# Patient Record
Sex: MALE | Race: WHITE | Employment: OTHER | URBAN - METROPOLITAN AREA
[De-identification: names, ages, dates, MRNs, and addresses within clinical notes are randomized per-mention and may not be internally consistent; named-entity substitution may affect disease eponyms.]

---

## 2017-11-07 ENCOUNTER — TRANSFERRED RECORDS (OUTPATIENT)
Dept: HEALTH INFORMATION MANAGEMENT | Facility: CLINIC | Age: 77
End: 2017-11-07

## 2017-11-15 ENCOUNTER — TRANSFERRED RECORDS (OUTPATIENT)
Dept: HEALTH INFORMATION MANAGEMENT | Facility: CLINIC | Age: 77
End: 2017-11-15

## 2017-11-21 ENCOUNTER — TRANSFERRED RECORDS (OUTPATIENT)
Dept: HEALTH INFORMATION MANAGEMENT | Facility: CLINIC | Age: 77
End: 2017-11-21

## 2017-12-04 ENCOUNTER — PRE VISIT (OUTPATIENT)
Dept: UROLOGY | Facility: CLINIC | Age: 77
End: 2017-12-04

## 2017-12-04 NOTE — TELEPHONE ENCOUNTER
Patient with bladder cancer coming in for a second opinion. Chart reviewed and records have not been scanned yet. Message sent to oncology intake team.

## 2017-12-06 ENCOUNTER — TRANSFERRED RECORDS (OUTPATIENT)
Dept: HEALTH INFORMATION MANAGEMENT | Facility: CLINIC | Age: 77
End: 2017-12-06

## 2017-12-06 ENCOUNTER — OFFICE VISIT (OUTPATIENT)
Dept: UROLOGY | Facility: CLINIC | Age: 77
End: 2017-12-06

## 2017-12-06 VITALS
SYSTOLIC BLOOD PRESSURE: 142 MMHG | BODY MASS INDEX: 25.06 KG/M2 | HEART RATE: 62 BPM | WEIGHT: 169.2 LBS | DIASTOLIC BLOOD PRESSURE: 79 MMHG | HEIGHT: 69 IN

## 2017-12-06 DIAGNOSIS — C67.8 MALIGNANT NEOPLASM OF OVERLAPPING SITES OF BLADDER (H): Primary | ICD-10-CM

## 2017-12-06 LAB — MISCELLANEOUS TEST: NORMAL

## 2017-12-06 PROCEDURE — 00000346 ZZHCL STATISTIC REVIEW OUTSIDE SLIDES TC 88321: Performed by: UROLOGY

## 2017-12-06 RX ORDER — MULTIVITAMIN
1 TABLET ORAL EVERY MORNING
COMMUNITY
Start: 2005-05-03

## 2017-12-06 RX ORDER — SILDENAFIL 100 MG/1
TABLET, FILM COATED ORAL
COMMUNITY
Start: 2015-08-18 | End: 2020-09-22

## 2017-12-06 RX ORDER — GLUCOSAMINE SULFATE 500 MG
2 CAPSULE ORAL EVERY MORNING
COMMUNITY
Start: 2005-05-03 | End: 2020-09-22

## 2017-12-06 RX ORDER — HYDROCODONE BITARTRATE AND ACETAMINOPHEN 5; 325 MG/1; MG/1
TABLET ORAL
COMMUNITY
Start: 2017-11-15 | End: 2017-12-13

## 2017-12-06 RX ORDER — CEFAZOLIN SODIUM 1 G/3ML
1 INJECTION, POWDER, FOR SOLUTION INTRAMUSCULAR; INTRAVENOUS SEE ADMIN INSTRUCTIONS
Status: CANCELLED | OUTPATIENT
Start: 2017-12-06

## 2017-12-06 ASSESSMENT — ENCOUNTER SYMPTOMS
JOINT SWELLING: 0
HEMATURIA: 0
MYALGIAS: 1
DIFFICULTY URINATING: 0
MUSCLE CRAMPS: 0
NECK PAIN: 0
BACK PAIN: 0
ARTHRALGIAS: 0
DYSURIA: 1
FLANK PAIN: 0
MUSCLE WEAKNESS: 0
STIFFNESS: 0

## 2017-12-06 ASSESSMENT — PAIN SCALES - GENERAL: PAINLEVEL: NO PAIN (0)

## 2017-12-06 NOTE — PROGRESS NOTES
Urology Consult    Name: Max Zuñiga    MRN: [id number]   YOB: 1940               Chief Complaint:   Second opinion regarding MIBC management     History is obtained from the patient and chart review          History of Present Illness:   77M with hx of bladder cancer and BPH. He was being followed by . He is s/p TURBT 2012 with post op mitomycin. Path was HGTa, for which he received a 3 week course of mitomycin induction, but it appears that he had a surveillance cystoscopy on 10/24/2017 that noted an area of tumor recurrence on the right anterior wall. This was biopsied on 11/15/2017 with path showing HGT1 disease. 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 Recommended plan was to undergo another mitomycin induction but his daughter wanted a second opinion.     He had a recent CT IVP on 2017 that noted a plaque-like thickening along the right bladder wall suspected to be malignancy recurrence vs focal cystitis/reaction to therapy    He currently denies hematuria, dysuria, frequency, or urgency. He has a diagnosis of BPH but is not on any medications for it. He has a prior diagnosis of ED but is not taking any medications for it either.             Past Medical History:   Noted for above in addition to osteoarthritis, ED; remainder reviewed in EMR         Past Surgical History:   Noted for right inguinal hernia repair in , arthroscopic procedures in , cataract surgery ; remainder reviewed in EMR         Social History:   former smoker, 20 py, quit in . 2 drinks per week. No illicit drugs         Family History:   Negative for urologic malignancies         Allergies:   Not on File         Medications:     Ranitidine and OTC ibuprofen; remainder reviewed in EMR         Review of Systems:      ROS: 10 point ROS neg  other than the symptoms noted above in the HPI           Physical Exam:     VS:  T: Data Unavailable    HR: 62    BP: 142/79    RR: Data Unavailable   GEN:  AOx3.  NAD.    CV:  RRR  LUNGS: Non-labored breathing.   EXT:  Warm, well perfused.  SKIN:  Warm.  Dry.  No rashes.  NEURO:  CN grossly intact.            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 He was offered another course of MMC induction and BCG was deferred due to his history of positive mantoux tests.     Given his HGT1 disease and recent CT findings, he would need a reTURBT per AUA guidelines, with post-operative MMC instillation.       Plan:     - schedule for reTURBT with cysview    - cx bladder test today    - request prior pathology slides from OSH     - Urology will continue to follow. Please contact resident/PA on call with any questions or concerns.         This patient's exam findings, labs, and imaging discussed with urology staff surgeon Dr. Carey, who developed the treatment plan.    Sumaya Salazar MD MS  Urology Resident    Patient seen and examined with the resident.  Visit time 30 minutes and >50% spent in counseling.  I agree with the resident's note and plan of care.       Faustina Carey MD  Urology Staff

## 2017-12-06 NOTE — LETTER
2017       RE: Max Zuñiga  [address]  [facility] [address]     Dear Colleague,    Thank you for referring your patient, Max Zuñiga, to the [facility] UROLOGY AND [facility] FOR PROSTATE AND UROLOGIC CANCERS at [facility]. Please see a copy of my visit note below.    Urology Consult    Name: Max Zuñiga    MRN: [id number]   YOB: 1940               Chief Complaint:   Second opinion regarding MIBC management     History is obtained from the patient and chart review          History of Present Illness:   77M with hx of bladder cancer and BPH. He was being followed by . He is s/p TURBT 2012 with post op mitomycin. Path was HGTa, for which he received a 3 week course of mitomycin induction, but it appears that he had a surveillance cystoscopy on 10/24/2017 that noted an area of tumor recurrence on the right anterior wall. This was biopsied on 11/15/2017 with path showing HGT1 disease. 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 Recommended plan was to undergo another mitomycin induction but his daughter wanted a second opinion.     He had a recent CT IVP on 2017 that noted a plaque-like thickening along the right bladder wall suspected to be malignancy recurrence vs focal cystitis/reaction to therapy    He currently denies hematuria, dysuria, frequency, or urgency. He has a diagnosis of BPH but is not on any medications for it. He has a prior diagnosis of ED but is not taking any medications for it either.             Past Medical History:   Noted for above in addition to osteoarthritis, ED; remainder reviewed in EMR         Past Surgical History:   Noted for right inguinal hernia repair in , arthroscopic procedures in , cataract surgery 2016; remainder reviewed in EMR          Social History:   former smoker, 20 py, quit in 1979. 2 drinks per week. No illicit drugs         Family History:   Negative for urologic malignancies         Allergies:   Not on File         Medications:     Ranitidine and OTC ibuprofen; remainder reviewed in EMR         Review of Systems:      ROS: 10 point ROS neg other than the symptoms noted above in the HPI           Physical Exam:     VS:  T: Data Unavailable    HR: 62    BP: 142/79    RR: Data Unavailable   GEN:  AOx3.  NAD.    CV:  RRR  LUNGS: Non-labored breathing.   EXT:  Warm, well perfused.  SKIN:  Warm.  Dry.  No rashes.  NEURO:  CN grossly intact.            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 He was offered another course of MMC induction and BCG was deferred due to his history of positive mantoux tests.     Given his HGT1 disease and recent CT findings, he would need a reTURBT per AUA guidelines, with post-operative MMC instillation.       Plan:     - schedule for reTURBT with cysview    - cx bladder test today    - request prior pathology slides from OSH     - Urology will continue to follow. Please contact resident/PA on call with any questions or concerns.         This patient's exam findings, labs, and imaging discussed with urology staff surgeon Dr. Carey, who developed the treatment plan.    Sumaya Salazar MD MS  Urology Resident    Patient seen and examined with the resident.  Visit time 30 minutes and >50% spent in counseling.  I agree with the resident's note and plan of care.       Faustina Carey MD  Urology Staff                  Again, thank you for allowing me to participate in the care of your patient.      Sincerely,    Faustina Carey MD

## 2017-12-06 NOTE — MR AVS SNAPSHOT
After Visit Summary   12/6/2017    Max Zuñiga    MRN: [id number]           Patient Information     Date Of Birth          1940        Visit Information        Provider Department      12/6/2017 12:00 PM Faustina Carey MD [facility] Urology and [facility] for Prostate and Urologic Cancers        Today's Diagnoses     Malignant neoplasm of overlapping sites of bladder (H)    -  1      Care Instructions    Please do CX urine test today          Follow-ups after your visit        Additional Services     PAC Visit Referral (For [facility] Only)       Does this visit require an Anesthesia consult?  Yes - Evaluate for medical necessity related to one of the following conditions:      H&P done by:  N/A and Other (Specify): PAC      Please be aware that coverage of these services is subject to the terms and limitations of your health insurance plan.  Call member services at your health plan with any benefit or coverage questions.      Please bring the following to your appointment:  >>   Any x-rays, CTs or MRIs which have been performed.  Contact the facility where they were done to arrange for  prior to your scheduled appointment.  Any new CT, MRI or other procedures ordered by your specialist must be performed at a [facility] or coordinated by your clinic's referral office.    >>   List of current medications  >>   This referral request   >>   Any documents/labs given to you for this referral                  Follow-up notes from your care team     Return in about 3 weeks (around 12/27/2017).      Your next 10 appointments already scheduled     Dec 13, 2017  9:00 AM CST   (Arrive by 8:45 AM)   PAC EVALUATION with Uc Pac Heriberto 1   [facility] Preoperative Assessment Center ([facility])    [address] [place] [address]  4th Floor  [facility] [id number] [phone]            Dec 13, 2017 10:00 AM CST   (Arrive by 9:45 AM)   PAC RN ASSESSMENT with Uc Pac Rn   [facility] Preoperative  Assessment Center ([facility])    [address]  4th [facility] [address] [phone]            Dec 13, 2017 10:40 AM CST   (Arrive by 10:25 AM)   PAC Anesthesia Consult with  Pac Anesthesiologist   [facility] Preoperative Assessment Center ([facility])    [address]  4th [facility] [address] [phone]            Dec 18, 2017   Procedure with Faustina Carey MD   [facility] [place], Same Day Surgery (--)    [address]  [facility] [address] [phone]            Jan 03, 2018 11:40 AM CST   (Arrive by 11:25 AM)   Post-Op with Faustina Carey MD   [facility] Urology and [facility] for Prostate and Urologic Cancers ([facility])    [address] [id number] [phone]              Future tests that were ordered for you today     Open Future Orders        Priority Expected Expires Ordered    UA with Microscopic reflex to Culture Routine  12/11/2018 12/11/2017            Who to contact     Please call your clinic at [phone] to:    Ask questions about your health    Make or cancel appointments    Discuss your medicines    Learn about your test results    Speak to your doctor   If you have compliments or concerns about an experience at your clinic, or if you wish to file a complaint, please contact [facility] Patient Relations at [phone] or email us at [email or web address]         Additional Information About Your Visit        [employer] Information     [employer] gives you secure access to your electronic health record. If you see a primary care provider, you can also send messages to your care team and make appointments. If you have questions, please call your primary care clinic.  If you do not have a primary care provider, please call [phone] and they will assist you.      [employer] is an electronic gateway that  "provides easy, online access to your medical records. With [employer], you can request a clinic appointment, read your test results, renew a prescription or communicate with your care team.     To access your existing account, please contact your [facility] or call [phone] for assistance.        Care EveryWhere ID     This is your Care EveryWhere ID. This could be used by other organizations to access your [facility] medical records  [id number]        Your Vitals Were     Pulse Height BMI (Body Mass Index)             62 1.753 m (5' 9\") 24.99 kg/m2          Blood Pressure from Last 3 Encounters:   12/06/17 142/79    Weight from Last 3 Encounters:   12/06/17 76.7 kg (169 lb 3.2 oz)              We Performed the Following     Cx Bladder: Laboratory Miscellaneous Order     PAC Visit Referral (For [facility] Only)     Theresa-Operative Worksheet     Send outs misc test        Primary Care Provider    None Specified                Equal Access to Services     CARMIN[facility] : Hadii maryam bertrando Samir, waaxda luqadaha, qaybta kaalmada adevaleriayada, saray berry . So [facility] [phone].    ATENCIÓN: Si habla español, tiene a combs disposición servicios gratuitos de asistencia lingüística. Kamlesh al [phone].    We comply with applicable federal civil rights laws and [place] laws. We do not discriminate on the basis of race, color, national origin, age, disability, sex, sexual orientation, or gender identity.            Thank you!     Thank you for choosing [facility] UROLOGY AND [facility] FOR PROSTATE AND UROLOGIC CANCERS  for your care. Our goal is always to provide you with excellent care. Hearing back from our patients is one way we can continue to improve our services. Please take a few minutes to complete the written survey that you may receive in the mail after your visit with us. Thank you!             Your Updated Medication List - Protect others around you: Learn how " to safely use, store and throw away your medicines at www.disposemymeds.org.          This list is accurate as of: 12/6/17 11:59 PM.  Always use your most recent med list.                   Brand Name Dispense Instructions for use Diagnosis    glucosamine 500 MG Caps           HYDROcodone-acetaminophen 5-325 MG per tablet    NORCO          Multiple vitamin Tabs           ranitidine 75 MG tablet    ZANTAC     Take 75 mg by mouth        sildenafil 100 MG tablet    VIAGRA     Take 0.5-1 Tabs by mouth. Take it one hour before sexual activity.

## 2017-12-06 NOTE — LETTER
Date:December 13, 2017      Patient was self referred, no letter generated. Do not send.        [facility] Health Information

## 2017-12-11 DIAGNOSIS — C67.9 BLADDER CANCER (H): Primary | ICD-10-CM

## 2017-12-11 LAB — COPATH REPORT: NORMAL

## 2017-12-11 NOTE — NURSING NOTE
Pre Op Teaching Flowsheet       Pre and Post op Patient Education  Relevant Diagnosis:  Bladder tumor  Surgical procedure:  Transurethral removal of bladder tumor cysview  Teaching Topic:  Pre and post op teaching  Person Involved in teaching: Max Zuñiga    Motivation Level:  Asks Questions: Yes  Eager to Learn:  Yes  Cooperative: Yes  Receptive (willing/able to accept information):  Yes    Patient demonstrates understanding of the following:  Date of surgery:  12/18/17  Location of surgery:  [address] [place] [address]  History and Physical and any other testing necessary prior to surgery: Yes  Required time line for completion of History and Physical and any pre-op testing: Yes    Patient demonstrates understanding of the following:  Pre-op bowel prep:  None needed  Pre-op showering/scrub information with PCMX Soap: Yes  Blood thinner medications discussed and when to stop (if applicable):  Yes      Infection Prevention:   Patient demonstrates understanding of the following:  Surgical procedure site care taught: at time of discharge  Signs and symptoms of infection taught:  Yes      Post-op follow-up:  Discussed how to contact the hospital, nurse, and clinic scheduling staff if necessary.    Instructional materials used/given/mailed:  [facility] Booklet, post op teaching sheet, Map, Soap, and arrival/location information.    Surgical instructions packet given to patient in office:  Yes.

## 2017-12-13 ENCOUNTER — APPOINTMENT (OUTPATIENT)
Dept: SURGERY | Facility: CLINIC | Age: 77
End: 2017-12-13
Payer: COMMERCIAL

## 2017-12-13 ENCOUNTER — ANESTHESIA EVENT (OUTPATIENT)
Dept: SURGERY | Facility: CLINIC | Age: 77
End: 2017-12-13
Payer: COMMERCIAL

## 2017-12-13 ENCOUNTER — ALLIED HEALTH/NURSE VISIT (OUTPATIENT)
Dept: SURGERY | Facility: CLINIC | Age: 77
End: 2017-12-13
Payer: COMMERCIAL

## 2017-12-13 ENCOUNTER — OFFICE VISIT (OUTPATIENT)
Dept: SURGERY | Facility: CLINIC | Age: 77
End: 2017-12-13
Payer: COMMERCIAL

## 2017-12-13 VITALS
HEART RATE: 89 BPM | RESPIRATION RATE: 16 BRPM | SYSTOLIC BLOOD PRESSURE: 161 MMHG | HEIGHT: 69 IN | TEMPERATURE: 98 F | BODY MASS INDEX: 24.24 KG/M2 | DIASTOLIC BLOOD PRESSURE: 87 MMHG | OXYGEN SATURATION: 98 % | WEIGHT: 163.7 LBS

## 2017-12-13 DIAGNOSIS — Z01.818 PREOP EXAMINATION: Primary | ICD-10-CM

## 2017-12-13 DIAGNOSIS — C67.9 BLADDER CANCER (H): ICD-10-CM

## 2017-12-13 LAB
ALBUMIN UR-MCNC: 30 MG/DL
APPEARANCE UR: CLEAR
BILIRUB UR QL STRIP: NEGATIVE
COLOR UR AUTO: YELLOW
GLUCOSE UR STRIP-MCNC: NEGATIVE MG/DL
HGB UR QL STRIP: NEGATIVE
HYALINE CASTS #/AREA URNS LPF: 1 /LPF (ref 0–2)
KETONES UR STRIP-MCNC: NEGATIVE MG/DL
LEUKOCYTE ESTERASE UR QL STRIP: ABNORMAL
MUCOUS THREADS #/AREA URNS LPF: PRESENT /LPF
NITRATE UR QL: NEGATIVE
PH UR STRIP: 6 PH (ref 5–7)
RBC #/AREA URNS AUTO: 2 /HPF (ref 0–2)
SOURCE: ABNORMAL
SP GR UR STRIP: 1.01 (ref 1–1.03)
SQUAMOUS #/AREA URNS AUTO: <1 /HPF (ref 0–1)
UROBILINOGEN UR STRIP-MCNC: 0 MG/DL (ref 0–2)
WBC #/AREA URNS AUTO: 12 /HPF (ref 0–2)

## 2017-12-13 ASSESSMENT — LIFESTYLE VARIABLES: TOBACCO_USE: 1

## 2017-12-13 NOTE — H&P
Pre-Operative H & P     CC:  Preoperative exam to assess for increased cardiopulmonary risk while undergoing surgery and anesthesia.    Date of Encounter: 12/13/2017  Primary Care Physician:  System, Provider Not In    HPI  Max Zuñiga is a 77 year old male who presents for pre-operative H & P in preparation for TURBT with optical agent with Dr. Carey on 12/18/17 at [facility]. History is obtained from the patient.     Patient with history of bladder cancer and BPH, who recently consulted with Dr. Carey regarding management of recurrent bladder cancer. He is s/p TURBT in 2012 with post op mitomycin but more recent surveillance cystoscopy showed recurrence on right anterior wall. He was recommended to proceed to another mitomycin induction but a second opinion was requested by family. Above procedure is now planned.  Patient's history is otherwise significant for positive mantoux due to TB exposure in childhood from father. He has had negative surveillance CXRs.  Past Medical History  Past Medical History:   Diagnosis Date     Bladder cancer (H)      Cataract      Laryngopharyngeal reflux disease      Macular degeneration     lattice     OA (osteoarthritis)      Sensorineural hearing loss        Past Surgical History  Past Surgical History:   Procedure Laterality Date     CYSTOSCOPY, TRANSURETHRAL RESECTION (TUR) TUMOR BLADDER, COMBINED  2012, 11/15/2017     HC ECP WITH CATARACT SURGERY Left 11/28/2016     HC TOOTH EXTRACTION W/FORCEP       HERNIA REPAIR  1998     PROBE NASOLACRIMAL DUCT  2008    OS     Right knee arthroscopy  1997     TONSILLECTOMY         Hx of Blood transfusions/reactions: Denies.      Hx of abnormal bleeding or anti-platelet use: Denies.     Menstrual history: No LMP for male patient.    Steroid use in the last year: Denies.     Personal or FH with difficulty with Anesthesia:  Denies.    Prior to Admission Medications  Current Outpatient  "Prescriptions   Medication Sig Dispense Refill     ranitidine (ZANTAC) 150 MG tablet Take 150 mg by mouth every morning       glucosamine 500 MG CAPS Take 2 capsules by mouth every morning        Multiple vitamin TABS Take 1 tablet by mouth every morning        sildenafil (VIAGRA) 100 MG tablet Take 0.5-1 Tabs by mouth. Take it one hour before sexual activity.         Allergies  No Known Allergies    Social History  Social History     Social History     Marital status:      Spouse name: N/A     Number of children: N/A     Years of education: N/A     Occupational History     Not on file.     Social History Main Topics     Smoking status: Former Smoker     Packs/day: 2.00     Years: 20.00     Types: Cigarettes     Smokeless tobacco: Never Used      Comment: quit in 1979     Alcohol use Yes      Comment: 2 beers daily     Drug use: No     Sexual activity: Not on file     Other Topics Concern     Not on file     Social History Narrative       Family History  Family History   Problem Relation Age of Onset     Hypertension Mother      CEREBROVASCULAR DISEASE Mother      Tuberculosis Father      Cataracts Sister      Skin Cancer Sister      Attention Deficit Disorder Daughter      Scoliosis Daughter        Review of Systems  The complete review of systems is negative other than noted in the HPI or here.   Constitutional: Denies fever, chills, weight loss.  HEENT: Wears glasses for vision. Denies swallowing difficulty. Bilateral hearing aids. PND. Some phlegm in the morning.  Respiratory: Denies cough or shortness of breath. Some snoring but no other concerns for VALERIO.  CV: Denies chest pain or irregular HR. Good exercise tolerance. Jogs 25 minutes around his basement.  GI: Denies abdominal pain or bowel issues.  : Denies dysuria.  M/S: Generalized joint pain.    Temp: 98  F (36.7  C) Temp src: Oral BP: 161/87 Pulse: 89   Resp: 16 SpO2: 98 %         163 lbs 11.2 oz  5' 9\"   Body mass index is 24.17 kg/(m^2).     "   Physical Exam  Constitutional: Awake, alert, cooperative, no apparent distress, and appears stated age. Thin.  Eyes: Pupils equal, round and reactive to light, extra ocular muscles intact, sclera clear, conjunctiva normal. Glasses on.  HENT: Normocephalic, oral pharynx with moist mucus membranes, good dentition. No goiter appreciated. Bilateral hearing aids. Raspy voice.  Respiratory: Clear to auscultation bilaterally, no crackles or wheezing. No cough or obvious dyspnea.  Cardiovascular: Regular rate and rhythm, normal S1 and S2, and no murmur noted. Carotids, no bruits. No edema. Palpable pulses to radial  DP and PT arteries.   GI: Normal bowel sounds, soft, non-distended, non-tender, no masses palpated, no hepatosplenomegaly.    Lymph/Hematologic: No cervical lymphadenopathy and no supraclavicular lymphadenopathy.  Genitourinary: Deferred.  Skin: Warm and dry.    Musculoskeletal: Full ROM of neck. There is no redness, warmth, or swelling of the joints. Gross motor strength is normal.    Neurologic: Awake, alert, oriented to name, place and time. Cranial nerves II-XII are grossly intact. Gait is normal.   Neuropsychiatric: Calm, cooperative. Normal affect.     Labs: (personally reviewed) OSH 11/7/17  Cr 0.90  GFR >60  Glu 95  WBC 8.3  Hgb 13.8  Hct 40.2  Platelets 238  EKG: OSH 11/17/17 Sinus rhythm with marked sinus arrhythmia    CHEST X-RAY 2015 OSH  FINDINGS: Biapical scarring. Calcified granuloma left lung base. Remainder   of the lung fields are clear. No pleural effusions. Heart size is normal.   Degenerative change in the spine with thoracic kyphosis.  Outside records reviewed from: Care Everywhere    ASSESSMENT and PLAN  Max Zuñiga is a 77 year old male scheduled to undergo TURBT with optical agent by Dr. Carey on 12/18/17. He has the following specific operative considerations:   - RCRI : No serious cardiac risks.  - Anesthesia considerations:  Refer to PAC assessment in anesthesia records  - VTE  risk: 3%  - VALERIO # of risks 3/8 = Intermediate risk  - Risk of PONV score = 2.  If > 2, anti-emetic intervention recommended.     --Recurrent bladder cancer, s/p TURBT and mitomycin induction. Second opinion with Dr. Carey with above procedure now planned.   --No cardiac history, symptoms or meds. EKG above. Good exercise tolerance.   --Former smoker. 40 pack years, quit in 1979. Denies pulmonary symptoms. Denies concern for VALERIO but does snore occasionally.   --Laryngopharyngeal reflux with raspy voice. Will take Zantac on DOS.   --[place].  Arrival time, NPO, shower and medication instructions provided by nursing staff today. Preparing For Your Surgery handout given.  Patient was discussed with Dr Garcia.    ANGEL Conroy CNS  Preoperative Assessment Center  [facility]  Clinic and Surgery Center  Phone: [phone]  Fax: [phone]

## 2017-12-13 NOTE — PATIENT INSTRUCTIONS
Preparing for Your Surgery      Name:  Max Zuñiga   MRN:  [id number]   :  1940   Today's Date:  2017     Arriving for surgery:  Surgery date:  17  Arrival time:  11:30  Please come to:       [facility] [address]  [address] [place]  [address]    -   parking is available in front of the hospital from 5:15 am to 8:00 pm    -  Stop at the Information Desk in the lobby    -   Inform the information person that you are here for surgery. An escort to 3c will be provided. If you would not like an escort, please proceed to 3C on the 3rd floor. [phone]     What can I eat or drink?  -  You may have solid food or milk products until 8 hours prior to your surgery.  -  You may have water, apple juice or 7up/Sprite until 2 hours prior to your surgery.    Which medicines can I take?  -  Do NOT take these medications in the morning, the day of surgery:  Aspirin and supplements x 7 days before surgery  -  Please take these medications the day of surgery:  Tylenol if needed, zantac if normally taken in the morning    How do I prepare myself?  -  Take two showers: one the night before surgery; and one the morning of surgery.         Use Scrubcare or Hibiclens to wash from neck down.  You may use your own shampoo and conditioner. No other hair products.   -  Do NOT use lotion, powder, deodorant, or antiperspirant the day of your surgery.  -  Do NOT wear any makeup, fingernail polish or jewelry.  -  Begin using Incentive Spirometer 1 week prior to surgery.  Use 4 times per day, up to 5 breaths each time.  Bring Incentive Spirometer to hospital.  -Do not bring your own medications to the hospital, except for inhalers and eye drops.  -  Bring your ID and insurance card.    Questions or Concerns:  If you have questions or concerns, please call the  Preoperative Assessment Center, Monday-Friday 7AM-7PM:  [phone]

## 2017-12-13 NOTE — ANESTHESIA PREPROCEDURE EVALUATION
Anesthesia Evaluation     . Pt has had prior anesthetic. Type: General and MAC    No history of anesthetic complications          ROS/MED HX    ENT/Pulmonary: Comment: 40 pack year smoking history    (+)VALERIO risk factors snores loudly, other ENT- LPR-Zantac, PND, raspy voice, tobacco use, Past use , . Other pulmonary disease Family history of TB, patient with positive mantoux, no findings on surveillance CXR.    Neurologic:  - neg neurologic ROS     Cardiovascular:  - neg cardiovascular ROS   (+) ----. : . . . :. . Previous cardiac testing date:results:date: results:ECG reviewed date:11/7/17 results:SR with marked SA date: results:          METS/Exercise Tolerance:  >4 METS   Hematologic:  - neg hematologic  ROS       Musculoskeletal:   (+) arthritis, , , -       GI/Hepatic:  - neg GI/hepatic ROS       Renal/Genitourinary:  - ROS Renal section negative       Endo:  - neg endo ROS       Psychiatric:  - neg psychiatric ROS       Infectious Disease:  - neg infectious disease ROS       Malignancy:   (+) Malignancy History of Other  Other CA bladder cancer Active status post Surgery and Chemo         Other:    (+) No chance of pregnancy C-spine cleared: N/A, no H/O Chronic Pain,no other significant disability                    Physical Exam      Airway   Mallampati: II  TM distance: >3 FB  Neck ROM: full    Dental   (+) implants  Comment: Lower left side implant    Cardiovascular   Rhythm and rate: regular and normal      Pulmonary    breath sounds clear to auscultation    Other findings: For further details of assessment, testing, and physical exam please see H and P completed on same date.           PAC Discussion and Assessment    ASA Classification: 2  Case is suitable for: Jacob  Anesthetic techniques and relevant risks discussed: GA  Invasive monitoring and risk discussed: No  Types:   Possibility and Risk of blood transfusion discussed: No  NPO instructions given:   Additional anesthetic preparation and  risks discussed:   Needs early admission to pre-op area:   Other:     PAC Resident/NP Anesthesia Assessment:  Max Zuñiga is a 77 year old male scheduled to undergo TURBT with optical agent by Dr. Carey on 12/18/17. He has the following specific operative considerations:   - RCRI : No serious cardiac risks.  - VTE risk: 3%  - VALERIO # of risks 3/8 = Intermediate risk  - Risk of PONV score = 2.  If > 2, anti-emetic intervention recommended.    Last GA for TURBT. No history of problems with anesthesia.     --Recurrent bladder cancer, s/p TURBT and mitomycin induction. Second opinion with Dr. Carey with above procedure now planned.   --No cardiac history, symptoms or meds. EKG above. Good exercise tolerance.   --Former smoker. 40 pack years, quit in 1979. Denies pulmonary symptoms. Denies concern for VALERIO but does snore occasionally.   --Laryngopharyngeal reflux with raspy voice. Will take Zantac on DOS.   --[place].    Patient was discussed with Dr Garcia.        Reviewed and Signed by PAC Mid-Level Provider/Resident  Mid-Level Provider/Resident: ANGEL Acosta, CNS  Date: 12/12/17  Time: 9:10am    Attending Anesthesiologist Anesthesia Assessment:  77 year old for TURBT. Chart reviewed, patient seen and evaluated; agree with above assessment. Patient has no significant cardiac or pulmonary disease. Daughter who is dermatologist, medically savvy.     Patient is appropriate for the planned procedure without further workup or medical management change. The final anesthesia plan will be determined by the physician anesthesiologist caring for the patient on the day of surgery.    Reviewed and Signed by PAC Anesthesiologist  Anesthesiologist: carol ann  Date: 12/13/2017  Time:   Pass/Fail: Pass  Disposition:     PAC Pharmacist Assessment:        Pharmacist:   Date:   Time:      Anesthesia Plan      History & Physical Review  History and physical reviewed and following examination; no interval change.    ASA Status:  2 .    NPO  Status:  > 8 hours    Plan for General and ETT with Intravenous induction. Maintenance will be Balanced.    PONV prophylaxis:  Ondansetron (or other 5HT-3) and Dexamethasone or Solumedrol       Postoperative Care  Postoperative pain management:  IV analgesics and Oral pain medications.      Consents  Anesthetic plan, risks, benefits and alternatives discussed with:  Patient..                          .

## 2017-12-13 NOTE — MR AVS SNAPSHOT
After Visit Summary   2017    Max Zuñiga    MRN: [id number]           Patient Information     Date Of Birth          1940        Visit Information        Provider Department      2017 10:00 AM Rn, [facility]        Care Instructions    Preparing for Your Surgery      Name:  Max Zuñiga   MRN:  [id number]   :  1940   Today's Date:  2017     Arriving for surgery:  Surgery date:  17  Arrival time:  11:30  Please come to:       [facility] [address]  [address] [place]  [address]    -   parking is available in front of the hospital from 5:15 am to 8:00 pm    -  Stop at the Information Desk in the lobby    -   Inform the information person that you are here for surgery. An escort to 3c will be provided. If you would not like an escort, please proceed to 3C on the 3rd floor. [phone]     What can I eat or drink?  -  You may have solid food or milk products until 8 hours prior to your surgery.  -  You may have water, apple juice or 7up/Sprite until 2 hours prior to your surgery.    Which medicines can I take?  -  Do NOT take these medications in the morning, the day of surgery:  Aspirin and supplements x 7 days before surgery  -  Please take these medications the day of surgery:  Tylenol if needed, zantac if normally taken in the morning    How do I prepare myself?  -  Take two showers: one the night before surgery; and one the morning of surgery.         Use Scrubcare or Hibiclens to wash from neck down.  You may use your own shampoo and conditioner. No other hair products.   -  Do NOT use lotion, powder, deodorant, or antiperspirant the day of your surgery.  -  Do NOT wear any makeup, fingernail polish or jewelry.  -  Begin using Incentive Spirometer 1 week prior to surgery.  Use 4 times per day, up to 5 breaths each time.  Bring Incentive Spirometer to hospital.  -Do not bring  your own medications to the hospital, except for inhalers and eye drops.  -  Bring your ID and insurance card.    Questions or Concerns:  If you have questions or concerns, please call the  Preoperative Assessment Center, Monday-Friday 7AM-7PM:  [phone]                    Follow-ups after your visit        Your next 10 appointments already scheduled     Dec 13, 2017 10:00 AM CST   (Arrive by 9:45 AM)   PAC RN ASSESSMENT with  Pac Rn   [facility] ([facility])    [address] [place] [id number] [phone]            Dec 13, 2017 10:40 AM CST   (Arrive by 10:25 AM)   PAC Anesthesia Consult with  Pac Anesthesiologist   [facility] ([facility])    [address] [place] [id number] [phone]            Dec 13, 2017 11:00 AM CST   LAB with  LAB   [facility] ([facility])    [address] [place] [id number] [phone]           Please do not eat 10-12 hours before your appointment if you are coming in fasting for labs on lipids, cholesterol, or glucose (sugar). This does not apply to pregnant women. Water, hot tea and black coffee (with nothing added) are okay. Do not drink other fluids, diet soda or chew gum.            Dec 18, 2017   Procedure with Faustina Carey MD   [facility] [place], Same Day Surgery (--)    [address] [facility] [address]   [phone]            Jan 03, 2018 11:40 AM CST   (Arrive by 11:25 AM)   Post-Op with Faustina Carey MD   [facility] [place]rologic [facility] ([facility])    [address] [place] [id number] [phone]              Who to contact     Please call your clinic at [phone] to:    Ask questions about your health    Make or cancel appointments    Discuss your  medicines    Learn about your test results    Speak to your doctor   If you have compliments or concerns about an experience at your clinic, or if you wish to file a complaint, please contact [facility] Patient Relations at [phone] or email us at [email or web address]         Additional Information About Your Visit        MyChart Information     [employer] gives you secure access to your electronic health record. If you see a primary care provider, you can also send messages to your care team and make appointments. If you have questions, please call your primary care clinic.  If you do not have a primary care provider, please call [phone] and they will assist you.      [employer] is an electronic gateway that provides easy, online access to your medical records. With [employer], you can request a clinic appointment, read your test results, renew a prescription or communicate with your care team.     To access your existing account, please contact your [facility] or call [phone] for assistance.        Care EveryWhere ID     This is your Care EveryWhere ID. This could be used by other organizations to access your [place] medical records  [id number]         Blood Pressure from Last 3 Encounters:   12/13/17 161/87   12/06/17 142/79    Weight from Last 3 Encounters:   12/13/17 74.3 kg (163 lb 11.2 oz)   12/06/17 76.7 kg (169 lb 3.2 oz)              Today, you had the following     No orders found for display       Primary Care Provider Fax #    Provider Not In System [phone]                Equal Access to Services     CARMINA BARNARD : Hadii maryam bertrando Sotyrone, waaxda luqadaha, qaybta kaalmada anson, saray berry . So [facility] [phone].    ATENCIÓN: Si habla español, tiene a combs disposición servicios gratuitos de asistencia lingüística. Llame al [phone].    We comply with applicable federal civil rights  laws and [place] laws. We do not discriminate on the basis of race, color, national origin, age, disability, sex, sexual orientation, or gender identity.            Thank you!     Thank you for choosing [facility]  for your care. Our goal is always to provide you with excellent care. Hearing back from our patients is one way we can continue to improve our services. Please take a few minutes to complete the written survey that you may receive in the mail after your visit with us. Thank you!             Your Updated Medication List - Protect others around you: Learn how to safely use, store and throw away your medicines at www.disposemymeds.org.          This list is accurate as of: 12/13/17  9:33 AM.  Always use your most recent med list.                   Brand Name Dispense Instructions for use Diagnosis    glucosamine 500 MG Caps      Take 2 capsules by mouth every morning        Multiple vitamin Tabs      Take 1 tablet by mouth every morning        sildenafil 100 MG tablet    VIAGRA     Take 0.5-1 Tabs by mouth. Take it one hour before sexual activity.        ZANTAC 150 MG tablet   Generic drug:  ranitidine      Take 150 mg by mouth every morning

## 2017-12-14 LAB
BACTERIA SPEC CULT: NO GROWTH
SPECIMEN SOURCE: NORMAL

## 2017-12-18 ENCOUNTER — HOSPITAL ENCOUNTER (OUTPATIENT)
Facility: CLINIC | Age: 77
Discharge: HOME | End: 2017-12-18
Attending: UROLOGY | Admitting: UROLOGY
Payer: COMMERCIAL

## 2017-12-18 ENCOUNTER — ANESTHESIA (OUTPATIENT)
Dept: SURGERY | Facility: CLINIC | Age: 77
End: 2017-12-18
Payer: COMMERCIAL

## 2017-12-18 VITALS
RESPIRATION RATE: 16 BRPM | OXYGEN SATURATION: 96 % | HEART RATE: 70 BPM | TEMPERATURE: 97.6 F | HEIGHT: 69 IN | DIASTOLIC BLOOD PRESSURE: 100 MMHG | BODY MASS INDEX: 24.33 KG/M2 | WEIGHT: 164.24 LBS | SYSTOLIC BLOOD PRESSURE: 147 MMHG

## 2017-12-18 DIAGNOSIS — C67.8 MALIGNANT NEOPLASM OF OVERLAPPING SITES OF BLADDER (H): ICD-10-CM

## 2017-12-18 LAB — GLUCOSE BLDC GLUCOMTR-MCNC: 111 MG/DL (ref 70–99)

## 2017-12-18 PROCEDURE — 27210794 ZZH OR GENERAL SUPPLY STERILE: Performed by: UROLOGY

## 2017-12-18 PROCEDURE — 25000125 ZZHC RX 250: Performed by: NURSE ANESTHETIST, CERTIFIED REGISTERED

## 2017-12-18 PROCEDURE — 88305 TISSUE EXAM BY PATHOLOGIST: CPT | Performed by: UROLOGY

## 2017-12-18 PROCEDURE — 25000128 H RX IP 250 OP 636: Performed by: NURSE ANESTHETIST, CERTIFIED REGISTERED

## 2017-12-18 PROCEDURE — 25000128 H RX IP 250 OP 636: Performed by: ANESTHESIOLOGY

## 2017-12-18 PROCEDURE — 71000027 ZZH RECOVERY PHASE 2 EACH 15 MINS: Performed by: UROLOGY

## 2017-12-18 PROCEDURE — C9399 UNCLASSIFIED DRUGS OR BIOLOG: HCPCS | Performed by: NURSE ANESTHETIST, CERTIFIED REGISTERED

## 2017-12-18 PROCEDURE — 25000128 H RX IP 250 OP 636: Performed by: UROLOGY

## 2017-12-18 PROCEDURE — 71000014 ZZH RECOVERY PHASE 1 LEVEL 2 FIRST HR: Performed by: UROLOGY

## 2017-12-18 PROCEDURE — 37000008 ZZH ANESTHESIA TECHNICAL FEE, 1ST 30 MIN: Performed by: UROLOGY

## 2017-12-18 PROCEDURE — 82962 GLUCOSE BLOOD TEST: CPT

## 2017-12-18 PROCEDURE — C9275 HEXAMINOLEVULINATE HCL: HCPCS | Performed by: UROLOGY

## 2017-12-18 PROCEDURE — 40000170 ZZH STATISTIC PRE-PROCEDURE ASSESSMENT II: Performed by: UROLOGY

## 2017-12-18 PROCEDURE — 36000059 ZZH SURGERY LEVEL 3 EA 15 ADDTL MIN UMMC: Performed by: UROLOGY

## 2017-12-18 PROCEDURE — 37000009 ZZH ANESTHESIA TECHNICAL FEE, EACH ADDTL 15 MIN: Performed by: UROLOGY

## 2017-12-18 PROCEDURE — 36000057 ZZH SURGERY LEVEL 3 1ST 30 MIN - UMMC: Performed by: UROLOGY

## 2017-12-18 PROCEDURE — 25000125 ZZHC RX 250: Performed by: UROLOGY

## 2017-12-18 PROCEDURE — 25000566 ZZH SEVOFLURANE, EA 15 MIN: Performed by: UROLOGY

## 2017-12-18 RX ORDER — LIDOCAINE 40 MG/G
CREAM TOPICAL
Status: DISCONTINUED | OUTPATIENT
Start: 2017-12-18 | End: 2017-12-18 | Stop reason: HOSPADM

## 2017-12-18 RX ORDER — ONDANSETRON 2 MG/ML
4 INJECTION INTRAMUSCULAR; INTRAVENOUS EVERY 30 MIN PRN
Status: DISCONTINUED | OUTPATIENT
Start: 2017-12-18 | End: 2017-12-18

## 2017-12-18 RX ORDER — FENTANYL CITRATE 50 UG/ML
25-50 INJECTION, SOLUTION INTRAMUSCULAR; INTRAVENOUS
Status: DISCONTINUED | OUTPATIENT
Start: 2017-12-18 | End: 2017-12-18

## 2017-12-18 RX ORDER — HYDROMORPHONE HYDROCHLORIDE 1 MG/ML
.3-.5 INJECTION, SOLUTION INTRAMUSCULAR; INTRAVENOUS; SUBCUTANEOUS EVERY 5 MIN PRN
Status: DISCONTINUED | OUTPATIENT
Start: 2017-12-18 | End: 2017-12-18 | Stop reason: HOSPADM

## 2017-12-18 RX ORDER — FENTANYL CITRATE 50 UG/ML
25-50 INJECTION, SOLUTION INTRAMUSCULAR; INTRAVENOUS
Status: DISCONTINUED | OUTPATIENT
Start: 2017-12-18 | End: 2017-12-18 | Stop reason: HOSPADM

## 2017-12-18 RX ORDER — PROPOFOL 10 MG/ML
INJECTION, EMULSION INTRAVENOUS PRN
Status: DISCONTINUED | OUTPATIENT
Start: 2017-12-18 | End: 2017-12-18

## 2017-12-18 RX ORDER — SODIUM CHLORIDE, SODIUM LACTATE, POTASSIUM CHLORIDE, CALCIUM CHLORIDE 600; 310; 30; 20 MG/100ML; MG/100ML; MG/100ML; MG/100ML
INJECTION, SOLUTION INTRAVENOUS CONTINUOUS PRN
Status: DISCONTINUED | OUTPATIENT
Start: 2017-12-18 | End: 2017-12-18

## 2017-12-18 RX ORDER — ONDANSETRON 2 MG/ML
INJECTION INTRAMUSCULAR; INTRAVENOUS PRN
Status: DISCONTINUED | OUTPATIENT
Start: 2017-12-18 | End: 2017-12-18

## 2017-12-18 RX ORDER — AMOXICILLIN 250 MG
2 CAPSULE ORAL 2 TIMES DAILY
Qty: 60 TABLET | Refills: 0 | Status: SHIPPED | OUTPATIENT
Start: 2017-12-18 | End: 2018-01-03

## 2017-12-18 RX ORDER — NALOXONE HYDROCHLORIDE 0.4 MG/ML
.1-.4 INJECTION, SOLUTION INTRAMUSCULAR; INTRAVENOUS; SUBCUTANEOUS
Status: DISCONTINUED | OUTPATIENT
Start: 2017-12-18 | End: 2017-12-18

## 2017-12-18 RX ORDER — ACETAMINOPHEN 325 MG/1
325-650 TABLET ORAL EVERY 6 HOURS PRN
Qty: 45 TABLET | Refills: 0 | Status: SHIPPED | OUTPATIENT
Start: 2017-12-18 | End: 2018-01-03

## 2017-12-18 RX ORDER — CEFAZOLIN SODIUM 1 G/3ML
1 INJECTION, POWDER, FOR SOLUTION INTRAMUSCULAR; INTRAVENOUS SEE ADMIN INSTRUCTIONS
Status: DISCONTINUED | OUTPATIENT
Start: 2017-12-18 | End: 2017-12-18 | Stop reason: HOSPADM

## 2017-12-18 RX ORDER — SODIUM CHLORIDE, SODIUM LACTATE, POTASSIUM CHLORIDE, CALCIUM CHLORIDE 600; 310; 30; 20 MG/100ML; MG/100ML; MG/100ML; MG/100ML
INJECTION, SOLUTION INTRAVENOUS CONTINUOUS
Status: DISCONTINUED | OUTPATIENT
Start: 2017-12-18 | End: 2017-12-18 | Stop reason: HOSPADM

## 2017-12-18 RX ORDER — FENTANYL CITRATE 50 UG/ML
INJECTION, SOLUTION INTRAMUSCULAR; INTRAVENOUS PRN
Status: DISCONTINUED | OUTPATIENT
Start: 2017-12-18 | End: 2017-12-18

## 2017-12-18 RX ORDER — LIDOCAINE HYDROCHLORIDE 20 MG/ML
INJECTION, SOLUTION INFILTRATION; PERINEURAL PRN
Status: DISCONTINUED | OUTPATIENT
Start: 2017-12-18 | End: 2017-12-18

## 2017-12-18 RX ORDER — EPHEDRINE SULFATE 50 MG/ML
INJECTION, SOLUTION INTRAMUSCULAR; INTRAVENOUS; SUBCUTANEOUS PRN
Status: DISCONTINUED | OUTPATIENT
Start: 2017-12-18 | End: 2017-12-18

## 2017-12-18 RX ORDER — METOPROLOL TARTRATE 1 MG/ML
1-2 INJECTION, SOLUTION INTRAVENOUS EVERY 5 MIN PRN
Status: DISCONTINUED | OUTPATIENT
Start: 2017-12-18 | End: 2017-12-18 | Stop reason: HOSPADM

## 2017-12-18 RX ORDER — SODIUM CHLORIDE, SODIUM LACTATE, POTASSIUM CHLORIDE, CALCIUM CHLORIDE 600; 310; 30; 20 MG/100ML; MG/100ML; MG/100ML; MG/100ML
INJECTION, SOLUTION INTRAVENOUS CONTINUOUS
Status: DISCONTINUED | OUTPATIENT
Start: 2017-12-18 | End: 2017-12-18

## 2017-12-18 RX ORDER — NALOXONE HYDROCHLORIDE 0.4 MG/ML
.1-.4 INJECTION, SOLUTION INTRAMUSCULAR; INTRAVENOUS; SUBCUTANEOUS
Status: DISCONTINUED | OUTPATIENT
Start: 2017-12-18 | End: 2017-12-18 | Stop reason: HOSPADM

## 2017-12-18 RX ORDER — ONDANSETRON 4 MG/1
4 TABLET, ORALLY DISINTEGRATING ORAL EVERY 30 MIN PRN
Status: DISCONTINUED | OUTPATIENT
Start: 2017-12-18 | End: 2017-12-18

## 2017-12-18 RX ORDER — ONDANSETRON 2 MG/ML
4 INJECTION INTRAMUSCULAR; INTRAVENOUS EVERY 30 MIN PRN
Status: DISCONTINUED | OUTPATIENT
Start: 2017-12-18 | End: 2017-12-18 | Stop reason: HOSPADM

## 2017-12-18 RX ORDER — CEFAZOLIN SODIUM 2 G/100ML
2 INJECTION, SOLUTION INTRAVENOUS
Status: COMPLETED | OUTPATIENT
Start: 2017-12-18 | End: 2017-12-18

## 2017-12-18 RX ORDER — PHENAZOPYRIDINE HYDROCHLORIDE 100 MG/1
100-200 TABLET, FILM COATED ORAL 3 TIMES DAILY PRN
Qty: 12 TABLET | Refills: 0 | Status: SHIPPED | OUTPATIENT
Start: 2017-12-18 | End: 2018-01-03

## 2017-12-18 RX ORDER — ONDANSETRON 4 MG/1
4 TABLET, ORALLY DISINTEGRATING ORAL EVERY 30 MIN PRN
Status: DISCONTINUED | OUTPATIENT
Start: 2017-12-18 | End: 2017-12-18 | Stop reason: HOSPADM

## 2017-12-18 RX ADMIN — ONDANSETRON 4 MG: 2 INJECTION INTRAMUSCULAR; INTRAVENOUS at 16:00

## 2017-12-18 RX ADMIN — FENTANYL CITRATE 50 MCG: 50 INJECTION, SOLUTION INTRAMUSCULAR; INTRAVENOUS at 15:46

## 2017-12-18 RX ADMIN — CEFAZOLIN SODIUM 2 G: 2 INJECTION, SOLUTION INTRAVENOUS at 16:00

## 2017-12-18 RX ADMIN — LIDOCAINE HYDROCHLORIDE 10 ML: 20 JELLY TOPICAL at 15:08

## 2017-12-18 RX ADMIN — LIDOCAINE HYDROCHLORIDE 80 MG: 20 INJECTION, SOLUTION INFILTRATION; PERINEURAL at 15:51

## 2017-12-18 RX ADMIN — SODIUM CHLORIDE, POTASSIUM CHLORIDE, SODIUM LACTATE AND CALCIUM CHLORIDE: 600; 310; 30; 20 INJECTION, SOLUTION INTRAVENOUS at 15:41

## 2017-12-18 RX ADMIN — ROCURONIUM BROMIDE 50 MG: 10 INJECTION INTRAVENOUS at 15:51

## 2017-12-18 RX ADMIN — HEXAMINOLEVULINATE HYDROCHLORIDE 100 MG: KIT at 15:08

## 2017-12-18 RX ADMIN — Medication 0.2 MG: at 17:29

## 2017-12-18 RX ADMIN — FENTANYL CITRATE 50 MCG: 50 INJECTION, SOLUTION INTRAMUSCULAR; INTRAVENOUS at 15:51

## 2017-12-18 RX ADMIN — Medication 0.5 MG: at 17:49

## 2017-12-18 RX ADMIN — Medication 5 MG: at 16:27

## 2017-12-18 RX ADMIN — Medication 0.2 MG: at 17:38

## 2017-12-18 RX ADMIN — SUGAMMADEX 200 MG: 100 INJECTION, SOLUTION INTRAVENOUS at 17:08

## 2017-12-18 RX ADMIN — PROPOFOL 150 MG: 10 INJECTION, EMULSION INTRAVENOUS at 15:51

## 2017-12-18 NOTE — OR NURSING
[facility] medical records called.  Plan to fax over pt's lab results and EKG during pre-op.

## 2017-12-18 NOTE — ANESTHESIA CARE TRANSFER NOTE
Patient: Max Zuñiga    Procedure(s):  Cystoscopy, Bladder Biopsy,  Fulguration - Wound Class: II-Clean Contaminated    Diagnosis: Bladder Tumor   Diagnosis Additional Information: No value filed.    Anesthesia Type:   General, ETT     Note:  Airway :Face Mask  Patient transferred to:PACU  Comments: Pt extubated in the OR without incident or complications. Pt VSS upon arrival to the PACU. Pt has no c/o pain/N/V. Pt care report given to receiving RN.       Vitals: (Last set prior to Anesthesia Care Transfer)    CRNA VITALS  12/18/2017 1647 - 12/18/2017 1723      12/18/2017             Pulse: 95    Ht Rate: 88    SpO2: 100 %    Resp Rate (observed): (!)  7                Electronically Signed By: ANGEL Crystal CRNA  December 18, 2017  5:23 PM

## 2017-12-18 NOTE — IP AVS SNAPSHOT
Post Anesthesia Care Unit [address] [address]    Phone:  [phone]                                       After Visit Summary   12/18/2017    Max Zuñiga    MRN: [id number]           After Visit Summary Signature Page     I have received my discharge instructions, and my questions have been answered. I have discussed any challenges I see with this plan with the nurse or doctor.    ..........................................................................................................................................  Patient/Patient Representative Signature      ..........................................................................................................................................  Patient Representative Print Name and Relationship to Patient    ..................................................               ................................................  Date                                            Time    ..........................................................................................................................................  Reviewed by Signature/Title    ...................................................              ..............................................  Date                                                            Time

## 2017-12-18 NOTE — OR NURSING
Dr. Carey paged to request a time to insert Cystview.  MD would like Cystview to be given now.      Esquivel emptied 1200 cc of yellow, curry urine prior to insertion of Cystview.  Patient tolerated the procedure well.    Continue to monitor closely.

## 2017-12-18 NOTE — BRIEF OP NOTE
[facility], [place]    Brief Operative Note    Pre-operative diagnosis: Bladder Tumor   Post-operative diagnosis Same  Procedure: Procedure(s):  Cystoscopy, Bladder Biopsy,  Fulguration - Wound Class: II-Clean Contaminated  Surgeon: Surgeon(s) and Role:     * Faustina Carey MD - Primary     * Ej Simms MD - Resident - Assisting  Anesthesia: General   Estimated blood loss: 25cc  Drains: 18Fr [place] simmons  Specimens:   ID Type Source Tests Collected by Time Destination   A : Right lateral wall tumor base biopsy Tissue Bladder SURGICAL PATHOLOGY EXAM Faustina Carey MD 12/18/2017  4:29 PM    B : Left lateral wall Tissue Bladder SURGICAL PATHOLOGY EXAM Faustina Carey MD 12/18/2017  4:33 PM    C : Posterior wall Tissue Bladder SURGICAL PATHOLOGY EXAM Faustina Carey MD 12/18/2017  4:33 PM    D : Right lateral wall erythema Tissue Bladder SURGICAL PATHOLOGY EXAM Faustina Carey MD 12/18/2017  4:36 PM    E : Dome Tissue Bladder SURGICAL PATHOLOGY EXAM Faustina Carey MD 12/18/2017  4:37 PM    F : Bladder neck Tissue Bladder SURGICAL PATHOLOGY EXAM Faustina Carey MD 12/18/2017  4:40 PM      Findings:   Suspicious area around last resection on RLW. Biopsied base and margin. Also performed RBBx.  Complications: None.

## 2017-12-18 NOTE — IP AVS SNAPSHOT
MRN:[id number]                      After Visit Summary   12/18/2017    Max Zuñiga    MRN: [id number]           Thank you!     Thank you for choosing [facility] for your care. Our goal is always to provide you with excellent care. Hearing back from our patients is one way we can continue to improve our services. Please take a few minutes to complete the written survey that you may receive in the mail after you visit with us. Thank you!        Patient Information     Date Of Birth          1940        About your hospital stay     You were admitted on:  December 18, 2017 You last received care in the:  Post Anesthesia Care Unit [facility]    You were discharged on:  December 18, 2017       Who to Call     For medical emergencies, please call 911.  For non-urgent questions about your medical care, please call your primary care provider or clinic, [phone]  For questions related to your surgery, please call your surgery clinic        Attending Provider     Provider Specialty    Faustina Carey MD Urology       Primary Care Provider Office Phone # Fax #    Bee Schaffer [phone]      After Care Instructions     Discharge Instructions       Activity  - No strenuous exercise for 3 weeks.  - No lifting, pushing, pulling more than 10 pounds for 3 weeks.   - Do not strain with bowel movements.  - Do not drive until you can press the brake pedal quickly and fully without pain.   - Do not operate a motor vehicle while taking narcotic pain medications.     Urination  - Catheter removed prior to discharge  - Some light redness (up to a watermelon-like-pink in color) is expected in the upcoming 7-10days.   - If having hematuria (blood in the urine), make sure to increase your water intake and monitor for improvement  - If you are unable to urinate you should return urgently to the ED or call clinic to try to arrange for an urgent visit same day.       Medications  - Some pain  "medications contain both tylenol (acetaminophen) and a narcotic (Norco, vicodin, percocet), do not take more than 4,000mg of Tylenol (acetaminophen) from all sources in any 24 hour period.  - Narcotics can make you constipated.  Take over the counter fiber (metamucil or benefiber) and stool softeners (miralax, docusate or senna) while taking narcotic pain medications, but stop if you develop diarrhea.  - No driving or operating machinery while taking narcotic pain medications     Follow-Up:   - Follow up with Dr Carey as already scheduled  - Call your primary care provider to touch base regarding your recent admission.    - Call or return sooner than your regularly scheduled visit if you develop any of the following: fever (greater than 101.5), uncontrolled pain, uncontrolled nausea or vomiting, as well as increased redness, swelling, or drainage from your wound.     Phone numbers:   - Monday through Friday 8am to 4:30pm: Call [phone] with questions or to schedule or confirm appointment.    - Nights or weekends: call the after hours emergency pager - [phone] and tell the  \"I would like to page the Urology Resident on call.\"  - For emergencies, call 911                  Your next 10 appointments already scheduled     Jan 03, 2018 11:40 AM CST   (Arrive by 11:25 AM)   Post-Op with Faustina Carey MD   [facility]rology and [facility]rolog[facility] ([facility])    [address] [place] [id number] [phone]              Further instructions from your care team       [facility]  Same-Day Surgery   Adult Discharge Orders & Instructions     For 24 hours after surgery    1. Get plenty of rest.  A responsible adult must stay with you for at least 24 hours after you leave the hospital.   2. Do not drive or use heavy equipment.  If you have weakness or tingling, don't drive or use heavy equipment " "until this feeling goes away.  3. Do not drink alcohol.  4. Avoid strenuous or risky activities.  Ask for help when climbing stairs.   5. You may feel lightheaded.  IF so, sit for a few minutes before standing.  Have someone help you get up.   6. If you have nausea (feel sick to your stomach): Drink only clear liquids such as apple juice, ginger ale, broth or 7-Up.  Rest may also help.  Be sure to drink enough fluids.  Move to a regular diet as you feel able.  7. You may have a slight fever. Call the doctor if your fever is over 100.5 F (37.7 C) (taken under the tongue) or lasts longer than 24 hours.  8. You may have a dry mouth, a sore throat, muscle aches or trouble sleeping.  These should go away after 24 hours.  9. Do not make important or legal decisions.   Call your doctor for any of the followin.  Signs of infection (fever, growing tenderness at the surgery site, a large amount of drainage or bleeding, severe pain, foul-smelling drainage, redness, swelling).    2. It has been over 8 to 10 hours since surgery and you are still not able to urinate (pass water).    3.  Headache for over 24 hours.      To contact a doctor, call Dr Carey's office at [phone], 8 am to 4:30 pm or:    x   [phone] and ask for the resident on call for Urology (answered 24 hours a day)  x   Emergency Department:    [facility]: [phone]       (TTY for hearing impaired: [phone])                     Pending Results     Date and Time Order Name Status Description    2017 1630 Surgical pathology exam In process             Admission Information     Date & Time Provider Department Dept. Phone    2017 Faustina Carey MD Post Anesthesia Care Unit [facility] [phone]      Your Vitals Were     Blood Pressure Pulse Temperature Respirations Height Weight    139/79 70 97.2  F (36.2  C) (Temporal) 16 1.753 m (5' 9\") 74.5 kg (164 lb 3.9 oz)    Pulse Oximetry BMI (Body Mass Index)                " 97% 24.25 kg/m2          SocialStay Information     SocialStay gives you secure access to your electronic health record. If you see a primary care provider, you can also send messages to your care team and make appointments. If you have questions, please call your primary care clinic.  If you do not have a primary care provider, please call [phone] and they will assist you.        Care EveryWhere ID     This is your Care EveryWhere ID. This could be used by other organizations to access your [place] medical records  [id number]        Equal Access to Services     CARMINA BARNARD : Hadmax kahn Sotyrone, wasaulda luqadaha, qaybta kaalmamarine griggs, saray berry . So [facility] [phone].    ATENCIÓN: Si habla español, tiene a combs disposición servicios gratuitos de asistencia lingüística. Llame al [phone].    We comply with applicable federal civil rights laws and [place] laws. We do not discriminate on the basis of race, color, national origin, age, disability, sex, sexual orientation, or gender identity.               Review of your medicines      START taking        Dose / Directions    acetaminophen 325 MG tablet   Commonly known as:  TYLENOL   Used for:  Malignant neoplasm of overlapping sites of bladder (H)        Dose:  325-650 mg   Take 1-2 tablets (325-650 mg) by mouth every 6 hours as needed for other (mild pain)   Quantity:  45 tablet   Refills:  0       phenazopyridine 100 MG tablet   Commonly known as:  PYRIDIUM   Used for:  Malignant neoplasm of overlapping sites of bladder (H)        Dose:  100-200 mg   Take 1-2 tablets (100-200 mg) by mouth 3 times daily as needed for urinary tract discomfort May turn your urine orange   Quantity:  12 tablet   Refills:  0       senna-docusate 8.6-50 MG per tablet   Commonly known as:  SENOKOT-S;PERICOLACE   Used for:  Malignant neoplasm of overlapping sites of bladder (H)        Dose:  2 tablet   Take 2 tablets by mouth 2 times daily  To prevent constipation while taking narcotic pain medication. Start with 1 tablet twice daily. If no bowel movement in 24 hours, increase to 2 tablets twice daily.  Discontinue if you have loose stools or when you are no longer taking narcotics.   Quantity:  60 tablet   Refills:  0         CONTINUE these medicines which have NOT CHANGED        Dose / Directions    glucosamine 500 MG Caps        Dose:  2 capsule   Take 2 capsules by mouth every morning   Refills:  0       Multiple vitamin Tabs        Dose:  1 tablet   Take 1 tablet by mouth every morning   Refills:  0       sildenafil 100 MG tablet   Commonly known as:  VIAGRA        Take 0.5-1 Tabs by mouth. Take it one hour before sexual activity.   Refills:  0       ZANTAC 150 MG tablet   Generic drug:  ranitidine        Dose:  150 mg   Take 150 mg by mouth every morning   Refills:  0            Where to get your medicines      These medications were sent to [facility] [place], [place] - [address] [address]     Phone:  [phone]     acetaminophen 325 MG tablet    phenazopyridine 100 MG tablet         Some of these will need a paper prescription and others can be bought over the counter. Ask your nurse if you have questions.     Bring a paper prescription for each of these medications     senna-docusate 8.6-50 MG per tablet                Protect others around you: Learn how to safely use, store and throw away your medicines at www.disposemymeds.org.             Medication List: This is a list of all your medications and when to take them. Check marks below indicate your daily home schedule. Keep this list as a reference.      Medications           Morning Afternoon Evening Bedtime As Needed    acetaminophen 325 MG tablet   Commonly known as:  TYLENOL   Take 1-2 tablets (325-650 mg) by mouth every 6 hours as needed for other (mild pain)                                glucosamine 500 MG Caps   Take 2  capsules by mouth every morning                                Multiple vitamin Tabs   Take 1 tablet by mouth every morning                                phenazopyridine 100 MG tablet   Commonly known as:  PYRIDIUM   Take 1-2 tablets (100-200 mg) by mouth 3 times daily as needed for urinary tract discomfort May turn your urine orange                                senna-docusate 8.6-50 MG per tablet   Commonly known as:  SENOKOT-S;PERICOLACE   Take 2 tablets by mouth 2 times daily To prevent constipation while taking narcotic pain medication. Start with 1 tablet twice daily. If no bowel movement in 24 hours, increase to 2 tablets twice daily.  Discontinue if you have loose stools or when you are no longer taking narcotics.                                sildenafil 100 MG tablet   Commonly known as:  VIAGRA   Take 0.5-1 Tabs by mouth. Take it one hour before sexual activity.                                ZANTAC 150 MG tablet   Take 150 mg by mouth every morning   Generic drug:  ranitidine

## 2017-12-19 ENCOUNTER — PRE VISIT (OUTPATIENT)
Dept: UROLOGY | Facility: CLINIC | Age: 77
End: 2017-12-19

## 2017-12-19 NOTE — OR NURSING
Pt voided 175 cc with residual ranging from 40-91 cc. Dr. Marie said pt could discharge if residual was less than 100cc.

## 2017-12-19 NOTE — ANESTHESIA POSTPROCEDURE EVALUATION
Patient: Max Zuñiga    Procedure(s):  Cystoscopy, Bladder Biopsy,  Fulguration - Wound Class: II-Clean Contaminated    Diagnosis:Bladder Tumor   Diagnosis Additional Information: No value filed.    Anesthesia Type:  General, ETT    Note:  Anesthesia Post Evaluation    Patient location during evaluation: PACU  Patient participation: Able to fully participate in evaluation  Level of consciousness: awake and alert  Pain management: adequate  Airway patency: patent  Cardiovascular status: acceptable  Respiratory status: acceptable  Hydration status: acceptable  PONV: none     Anesthetic complications: None          Last vitals:  Vitals:    12/18/17 1730 12/18/17 1745 12/18/17 1800   BP: 126/81 109/72 134/79   Pulse:      Resp: 16 16 16   Temp: 34.6  C (94.3  F)  36.2  C (97.2  F)   SpO2: 100% 96% 97%         Electronically Signed By: Quinn Villanueva MD  December 18, 2017  6:11 PM

## 2017-12-19 NOTE — OR NURSING
Dr. Marie called back and stated if pt's residual was not less than 100, he could go home with a catheter. I told Dr. Marie that the pt opposes a catheter. He is attempting to void a third time. If it is less than 100, he can discharge.

## 2017-12-19 NOTE — DISCHARGE INSTRUCTIONS
[facility]  Same-Day Surgery   Adult Discharge Orders & Instructions     For 24 hours after surgery    1. Get plenty of rest.  A responsible adult must stay with you for at least 24 hours after you leave the hospital.   2. Do not drive or use heavy equipment.  If you have weakness or tingling, don't drive or use heavy equipment until this feeling goes away.  3. Do not drink alcohol.  4. Avoid strenuous or risky activities.  Ask for help when climbing stairs.   5. You may feel lightheaded.  IF so, sit for a few minutes before standing.  Have someone help you get up.   6. If you have nausea (feel sick to your stomach): Drink only clear liquids such as apple juice, ginger ale, broth or 7-Up.  Rest may also help.  Be sure to drink enough fluids.  Move to a regular diet as you feel able.  7. You may have a slight fever. Call the doctor if your fever is over 100.5 F (37.7 C) (taken under the tongue) or lasts longer than 24 hours.  8. You may have a dry mouth, a sore throat, muscle aches or trouble sleeping.  These should go away after 24 hours.  9. Do not make important or legal decisions.   Call your doctor for any of the followin.  Signs of infection (fever, growing tenderness at the surgery site, a large amount of drainage or bleeding, severe pain, foul-smelling drainage, redness, swelling).    2. It has been over 8 to 10 hours since surgery and you are still not able to urinate (pass water).    3.  Headache for over 24 hours.      To contact a doctor, call Dr Carey's office at [phone], 8 am to 4:30 pm or:    x   [phone] and ask for the resident on call for Urology (answered 24 hours a day)  x   Emergency Department:    [facility]: [phone]       (TTY for hearing impaired: [phone])

## 2017-12-19 NOTE — OR NURSING
Paged Dr. Marie with the last void/residual values. Void 175. Residual urine per bladder scan ranging from 40-91 cc.

## 2017-12-19 NOTE — OR NURSING
Writer spoke with Dr. Villanueva (anesthesia) asking for sign out. States he will enter one and patient may transfer to phase II recovery.

## 2017-12-19 NOTE — OR NURSING
Dr. Marie returned the call. He will check about the residual amounts and call me back if it is okay for discharge.

## 2017-12-19 NOTE — PROGRESS NOTES
Pt with bladder cancer coming in for a post op appointment - bladder biopsy. Pathology is processing, Cx has been sent, and slides from OSH received. No need for a call.

## 2017-12-19 NOTE — OR NURSING
Pt voided 500 cc pale yellow urine at 1900. Bladder scanned for 341 cc. Pt is drinking well. IVF were hanging, but reduced to TKO.

## 2017-12-19 NOTE — OR NURSING
Voided 350 cc pale yellow urine. Bladder scanned for amount ranging from 113-203 cc with urine in all 4 quadrants of image on scan. Paged Dr. Amanda Marie to report numbers and discuss discharge.

## 2017-12-19 NOTE — OR NURSING
Writer instilled 300 mL normal saline into bladder via catheter that was in place from OR. Then deflated balloon and removed catheter at 1800. Will attempt to have patient void.

## 2017-12-20 LAB — COPATH REPORT: NORMAL

## 2017-12-21 ENCOUNTER — CARE COORDINATION (OUTPATIENT)
Dept: UROLOGY | Facility: CLINIC | Age: 77
End: 2017-12-21

## 2017-12-21 ENCOUNTER — MYC MEDICAL ADVICE (OUTPATIENT)
Dept: UROLOGY | Facility: CLINIC | Age: 77
End: 2017-12-21

## 2017-12-28 NOTE — OP NOTE
OPERATIVE NOTE      PREOPERATIVE DIAGNOSIS: History of bladder cancer  POSTOPERATIVE DIAGNOSIS: Same  PROCEDURE PERFORMED:   1) Cystoscopy, bladder biopsy  2) Cysview  ATTENDING SURGEON: VALENTINA Carey MD   RESIDENT SURGEON: Ej Simms MD    FINDINGS: Suspicious area around last resection on RLW. Biopsied base and margin. Also performed random bladder biopsies.  ANESTHESIA: General.  ESTIMATED BLOOD LOSS: 5 mL.     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 He was offered another course of MMC induction and BCG was deferred due to his history of positive mantoux tests. Given his HGT1 disease and recent CT findings, he now presents for above procedures. After a discussion of options, risks, benefits the patient chose to procedure with the above procedures    DESCRIPTION OF PROCEDURE: After verifying informed consent, the patient was taken to the operating room. Adequate anesthesia was induced, he was placed in dorsal lithotomy position and he was prepped and draped in standard sterile fashion. A timeout was performed to verify correct patient and procedure. Pneumo boots and perioperative antibiotics were in place before the procedure commenced. We began the procedure by inserting a 22-Monegasque rigid cystoscope. The urethra was unremarkable.Upon gaining entrance into the bladder, complete survey was performed with both blue and white light. This was significant for suspicious area around RLW resection scar and this was biopsied both at the base and the margin. Additionally we performed random bladder biopsies in a standard templated fashion. Biopsied areas were fulgurated.  We were able to see both ureteral orifices and they remained un-harmed at the conclusion of the procedure    POSTOPERATIVE PLAN: Transfer to recovery    Ej Simms  PGY-5  Urology  [phone]

## 2018-01-03 ENCOUNTER — OFFICE VISIT (OUTPATIENT)
Dept: UROLOGY | Facility: CLINIC | Age: 78
End: 2018-01-03
Payer: COMMERCIAL

## 2018-01-03 VITALS
HEART RATE: 60 BPM | DIASTOLIC BLOOD PRESSURE: 81 MMHG | BODY MASS INDEX: 24.29 KG/M2 | WEIGHT: 164 LBS | HEIGHT: 69 IN | SYSTOLIC BLOOD PRESSURE: 146 MMHG

## 2018-01-03 DIAGNOSIS — C67.8 MALIGNANT NEOPLASM OF OVERLAPPING SITES OF BLADDER (H): Primary | ICD-10-CM

## 2018-01-03 ASSESSMENT — PAIN SCALES - GENERAL: PAINLEVEL: NO PAIN (0)

## 2018-01-03 NOTE — NURSING NOTE
Chief Complaint   Patient presents with     RECHECK     bladder cancer coming in for a post op appointment - bladder biopsy       Claudia Bush MA

## 2018-01-03 NOTE — MR AVS SNAPSHOT
After Visit Summary   1/3/2018    Max Zuñiga    MRN: [id number]           Patient Information     Date Of Birth          1940        Visit Information        Provider Department      1/3/2018 11:40 AM Faustina Carey MD [facility] Urology and [facility] for Prostate and Urologic Cancers        Care Instructions    Nisreen GRECO RN will contact you to set up BCG. Return for a cystoscopy after BCG is complete. Please come with a full bladder.    It was a pleasure meeting with you today.  Thank you for allowing me and my team the privilege of caring for you today.  YOU are the reason we are here, and I truly hope we provided you with the excellent service you deserve.  Please let us know if there is anything else we can do for you so that we can be sure you are leaving completely satisfied with your care experience.                  Follow-ups after your visit        Your next 10 appointments already scheduled     May 02, 2018  1:00 PM CDT   (Arrive by 12:45 PM)   Cystoscopy with Faustina Carey MD   [facility] Urology and [facility] for Prostate and Urologic Cancers ([facility])    [address] [place] [id number] [phone]              Who to contact     Please call your clinic at [phone] to:    Ask questions about your health    Make or cancel appointments    Discuss your medicines    Learn about your test results    Speak to your doctor   If you have compliments or concerns about an experience at your clinic, or if you wish to file a complaint, please contact [facility] Patient Relations at [phone] or email us at [email or web address]         Additional Information About Your Visit        MyChart Information     BubbleNoiset gives you secure access to your electronic health record. If you see a primary care provider, you can also send messages to your care team and make appointments. If you have questions,  "please call your primary care clinic.  If you do not have a primary care provider, please call [phone] and they will assist you.      SplitSecnd is an electronic gateway that provides easy, online access to your medical records. With Split[employer]nd, you can request a clinic appointment, read your test results, renew a prescription or communicate with your care team.     To access your existing account, please contact your [facility] or call [phone] for assistance.        Care EveryWhere ID     This is your Care EveryWhere ID. This could be used by other organizations to access your [facility] medical records  [id number]        Your Vitals Were     Pulse Height BMI (Body Mass Index)             60 1.753 m (5' 9\") 24.22 kg/m2          Blood Pressure from Last 3 Encounters:   01/03/18 146/81   12/18/17 (!) 147/100   12/13/17 161/87    Weight from Last 3 Encounters:   01/03/18 74.4 kg (164 lb)   12/18/17 74.5 kg (164 lb 3.9 oz)   12/13/17 74.3 kg (163 lb 11.2 oz)              Today, you had the following     No orders found for display         Today's Medication Changes          These changes are accurate as of: 1/3/18 12:14 PM.  If you have any questions, ask your nurse or doctor.               Stop taking these medicines if you haven't already. Please contact your care team if you have questions.     acetaminophen 325 MG tablet   Commonly known as:  TYLENOL   Stopped by:  Faustina Carey MD           phenazopyridine 100 MG tablet   Commonly known as:  PYRIDIUM   Stopped by:  Faustina Carey MD           senna-docusate 8.6-50 MG per tablet   Commonly known as:  SENOKOT-S;PERICOLACE   Stopped by:  Faustina Carey MD                    Primary Care Provider Office Phone # Fax #    Bee Schaffer [phone]       [facility] [address] [facility] [address]        Equal Access to Services     CARMINA BARNARD AH: farrah Lopes " bill marquisgallitomarine parissaray bird. So [facility] [phone].    ATENCIÓN: Si ya carnes, tiene a combs disposición servicios gratuitos de asistencia lingüística. Llame al [phone].    We comply with applicable federal civil rights laws and [place] laws. We do not discriminate on the basis of race, color, national origin, age, disability, sex, sexual orientation, or gender identity.            Thank you!     Thank you for choosing [facility] UROLOGY AND [facility] FOR PROSTATE AND UROLOGIC CANCERS  for your care. Our goal is always to provide you with excellent care. Hearing back from our patients is one way we can continue to improve our services. Please take a few minutes to complete the written survey that you may receive in the mail after your visit with us. Thank you!             Your Updated Medication List - Protect others around you: Learn how to safely use, store and throw away your medicines at www.disposemymeds.org.          This list is accurate as of: 1/3/18 12:14 PM.  Always use your most recent med list.                   Brand Name Dispense Instructions for use Diagnosis    glucosamine 500 MG Caps      Take 2 capsules by mouth every morning        Multiple vitamin Tabs      Take 1 tablet by mouth every morning        sildenafil 100 MG tablet    VIAGRA     Take 0.5-1 Tabs by mouth. Take it one hour before sexual activity.        ZANTAC 150 MG tablet   Generic drug:  ranitidine      Take 150 mg by mouth every morning

## 2018-01-03 NOTE — PATIENT INSTRUCTIONS
Nisreen GRECO RN will contact you to set up BCG. Return for a cystoscopy after BCG is complete. Please come with a full bladder.    It was a pleasure meeting with you today.  Thank you for allowing me and my team the privilege of caring for you today.  YOU are the reason we are here, and I truly hope we provided you with the excellent service you deserve.  Please let us know if there is anything else we can do for you so that we can be sure you are leaving completely satisfied with your care experience.

## 2018-01-03 NOTE — PROGRESS NOTES
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 However, the patient came to see Dr. Carey for a second opinion. Given his HGT1 disease and recent CT findings, we recommended a repeat TURBT with [facility], done on 12/18/2017, noting suspicious area around the most recent resection in the RLW. He was discharged the same day, and now returns for pathology review, which shows no evidence of malignancy, and only necrotic tissue with acute inflammation and the aforementioned suspicious margin of the recent resection.     He is otherwise doing well and has had minimal hematuria since this procedure      Current meds  No pertinent medications. Remainder reviewed in EMR.    OBJECTIVE:  Vitals:    01/03/18 1144   BP: 146/81   Pulse: 60   Weight: 74.4 kg (164 lb)   Height: 1.753 m (5' 9\")     EXAM:  GENERAL: No acute distress. Well nourished.   HEENT:  Sclerae anicteric.  Conjunctivae pink.  Moist mucous membranes.  LUNGS:  Non-labored breathing.  SKIN: No rashes.  Dry.     EXTREMITIES:  Warm, well perfused.  No Lower extremity edema bilaterally.  NEURO: normal gait, no focal deficits.     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 He was previously managed by outside urologist and was not given BCG due to his history of positive PPD tests due to history of exposure to TB in childhood. He repeat TURBT with [facility], done on 12/18/2017, noting suspicious area around the most recent resection " in the RLW. Pathology returns as NEM. He can start BCG.     PLAN:   - Schedule for BCG instillation/induction    Patient seen and plan discussed with Dr. Carey    Patient seen and examined with the resident.  Visit time 15 minutes and >50% spent in counseling.  I agree with the resident's note and plan of care.       Faustina Carey MD  Urology Staff      CC  Patient Care Team:  Bee Schaffer as PCP - General (Family Practice)  Faustina Carey MD as MD (Urology)  Shavon Swann, RN as Registered Nurse (Urology)  Self, Referred, MD as Referring Physician  PROVIDER NOT IN SYSTEM    Copy to patient  LEANA BAUER  [address] [facility] [address]

## 2018-01-03 NOTE — LETTER
"1/3/2018       RE: Max Zuñiga  [address]  [place] [facility] [address]     Dear Colleague,    Thank you for referring your patient, Max Zuñiga, to the [facility] UROLOGY AND INST FOR PROSTATE AND UROLOGIC CANCERS at [facility]. Please see a copy of my visit note below.    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 However, the patient came to see Dr. Carey for a second opinion. Given his HGT1 disease and recent CT findings, we recommended a repeat TURBT with [facility], done on 12/18/2017, noting suspicious area around the most recent resection in the RLW. He was discharged the same day, and now returns for pathology review, which shows no evidence of malignancy, and only necrotic tissue with acute inflammation and the aforementioned suspicious margin of the recent resection.     He is otherwise doing well and has had minimal hematuria since this procedure      Current meds  No pertinent medications. Remainder reviewed in EMR.    OBJECTIVE:  Vitals:    01/03/18 1144   BP: 146/81   Pulse: 60   Weight: 74.4 kg (164 lb)   Height: 1.753 m (5' 9\")     EXAM:  GENERAL: No acute distress. Well nourished.   HEENT:  Sclerae anicteric.  Conjunctivae pink.  Moist mucous membranes.  LUNGS:  Non-labored breathing.  SKIN: No rashes.  Dry.     EXTREMITIES:  Warm, well perfused.  No Lower extremity edema bilaterally.  NEURO: normal gait, no focal deficits.     Labs and imaging: as above     ASSESSMENT: Max Zuñiga is a 77 year old male with current NMIBC - initially HGTa disease on TURBT in 2012, s/p mitomycin " induction, with no recurrences until Oct 2017 - HGT1 disease. He was previously managed by outside urologist and was not given BCG due to his history of positive PPD tests due to history of exposure to TB in childhood. He repeat TURBT with [facility], done on 12/18/2017, noting suspicious area around the most recent resection in the RLW. Pathology returns as NEM. He can start BCG.     PLAN:   - Schedule for BCG instillation/induction    Patient seen and plan discussed with Dr. Carey    Patient seen and examined with the resident.  Visit time 15 minutes and >50% spent in counseling.  I agree with the resident's note and plan of care.       Faustina Carey MD  Urology Staff      CC  Patient Care Team:  Bee Schaffer as PCP - General (Family Practice)  Faustina Carey MD as MD (Urology)  Shavon Swann, RN as Registered Nurse (Urology)  Self, Referred, MD as Referring Physician  PROVIDER NOT IN SYSTEM    Copy to patient  LEANA BAUER  [address] [facility] [address]

## 2018-01-04 DIAGNOSIS — C67.8 MALIGNANT NEOPLASM OF OVERLAPPING SITES OF BLADDER (H): Primary | ICD-10-CM

## 2018-01-04 RX ORDER — LEVOFLOXACIN 500 MG/1
TABLET, FILM COATED ORAL
Qty: 12 TABLET | Refills: 0 | Status: SHIPPED | OUTPATIENT
Start: 2018-01-04 | End: 2018-05-04

## 2018-01-26 ENCOUNTER — INFUSION THERAPY VISIT (OUTPATIENT)
Dept: ONCOLOGY | Facility: CLINIC | Age: 78
End: 2018-01-26
Attending: UROLOGY
Payer: COMMERCIAL

## 2018-01-26 VITALS
DIASTOLIC BLOOD PRESSURE: 77 MMHG | OXYGEN SATURATION: 98 % | HEART RATE: 95 BPM | RESPIRATION RATE: 15 BRPM | TEMPERATURE: 98.5 F | SYSTOLIC BLOOD PRESSURE: 144 MMHG

## 2018-01-26 DIAGNOSIS — C67.8 MALIGNANT NEOPLASM OF OVERLAPPING SITES OF BLADDER (H): Primary | ICD-10-CM

## 2018-01-26 LAB
ALBUMIN UR-MCNC: NEGATIVE MG/DL
APPEARANCE UR: CLEAR
BILIRUB UR QL STRIP: NEGATIVE
COLOR UR AUTO: YELLOW
GLUCOSE UR STRIP-MCNC: NEGATIVE MG/DL
HGB UR QL STRIP: NEGATIVE
KETONES UR STRIP-MCNC: NEGATIVE MG/DL
LEUKOCYTE ESTERASE UR QL STRIP: NEGATIVE
NITRATE UR QL: NEGATIVE
PH UR STRIP: 7 PH (ref 5–7)
SOURCE: NORMAL
SP GR UR STRIP: 1.01 (ref 1–1.03)
UROBILINOGEN UR STRIP-MCNC: 0 MG/DL (ref 0–2)

## 2018-01-26 PROCEDURE — 51720 TREATMENT OF BLADDER LESION: CPT

## 2018-01-26 PROCEDURE — 81003 URINALYSIS AUTO W/O SCOPE: CPT | Performed by: UROLOGY

## 2018-01-26 PROCEDURE — 25000125 ZZHC RX 250: Mod: ZF | Performed by: UROLOGY

## 2018-01-26 PROCEDURE — 25000128 H RX IP 250 OP 636: Mod: ZF | Performed by: UROLOGY

## 2018-01-26 RX ADMIN — BACILLUS CALMETTE-GUERIN 50 MG: 50 POWDER, FOR SUSPENSION INTRAVESICAL at 14:09

## 2018-01-26 RX ADMIN — LIDOCAINE HYDROCHLORIDE 10 ML: 20 JELLY TOPICAL at 13:48

## 2018-01-26 ASSESSMENT — PAIN SCALES - GENERAL: PAINLEVEL: NO PAIN (0)

## 2018-01-26 NOTE — PATIENT INSTRUCTIONS
Home discharge instructions:  -To make sure each treatment has the best chance of working, follow these steps 2 hours after each treatment   1. Lie on your back for 15 minues   2. Lie on your left side for 15 mintues   3. Lie on your right side for 15 minutes   4. Get up but keep the medication  In your bladder for 60 more minutes- for a total of 2 hours   5. Empty your bladder following the directions below (if you have difficulty holding your bladder it is ok to empty your bladder early)  -Wear pad if incontinence is a possibliity  -Wash hands throughly after using the bathroom  -For safety reasons remember to follow these directions for 6 hours after each treatment:   1. Sit on the toilet seat to urinate   2. Immediately after urinating- add 2 cups of undiluted chlorine bleach to the toilet    3. Close lid and let the bleach sit for 15 minutes each time   4. Drink plenty of water to flush any remaining medication out of your bladder    **These steps will help kill all the BCG bacteria and disinfect the toilet**    Please void BCG at 4:25pm    Take your antibiotic today at 8:30 pm and tomorrow morning.    For any further questions please contact Nisreen at [phone]    Please call urology triage line at [phone] or [phone] with fevers or chills, burning with urination, or blood in your urine which lasts more than a 48-72 hours or with any question or concerns.        January 2018 Sunday Monday Tuesday Wednesday Thursday Friday Saturday        1     2     3     UMP POST-OP   11:25 AM   (20 min.)   Faustina Carey MD   [facility] Urology and Inst for Prostate and Urologic Cancers 4     5     6       7     8     9     10     11     12     13       14     15     16     17     18     19     20       21     22     23     24     25     26     UMP ONC INFUSION 120    1:00 PM   (120 min.)    ONCOLOGY INFUSION   [facility] 27       28     29     30     31                                February 2018 Sunday Monday Tuesday Wednesday Thursday Friday Saturday                       1     2     UMP ONC INFUSION 120   10:00 AM   (120 min.)    ONCOLOGY INFUSION   [facility] 3       4     5     6     7     8     9     10       11     12     13     UMP ONC INFUSION 120   10:00 AM   (120 min.)    ONCOLOGY INFUSION   [facility] 14     15     16     17       18     19     20     21     22     23     UMP ONC INFUSION 120   10:00 AM   (120 min.)    ONCOLOGY INFUSION   [facility] 24       25     26     27     28

## 2018-01-26 NOTE — MR AVS SNAPSHOT
After Visit Summary   1/26/2018    Max Zuñiga    MRN: [id number]           Patient Information     Date Of Birth          1940        Visit Information        Provider Department      1/26/2018 1:00 PM  BED 3 ATC;  ONCOLOGY INFUSION [facility]        Today's Diagnoses     Malignant neoplasm of overlapping sites of bladder (H)    -  1      Care Instructions    Home discharge instructions:  -To make sure each treatment has the best chance of working, follow these steps 2 hours after each treatment   1. Lie on your back for 15 minues   2. Lie on your left side for 15 mintues   3. Lie on your right side for 15 minutes   4. Get up but keep the medication  In your bladder for 60 more minutes- for a total of 2 hours   5. Empty your bladder following the directions below (if you have difficulty holding your bladder it is ok to empty your bladder early)  -Wear pad if incontinence is a possibliity  -Wash hands throughly after using the bathroom  -For safety reasons remember to follow these directions for 6 hours after each treatment:   1. Sit on the toilet seat to urinate   2. Immediately after urinating- add 2 cups of undiluted chlorine bleach to the toilet    3. Close lid and let the bleach sit for 15 minutes each time   4. Drink plenty of water to flush any remaining medication out of your bladder    **These steps will help kill all the BCG bacteria and disinfect the toilet**    Please void BCG at 4:25pm    Take your antibiotic today at 8:30 pm and tomorrow morning.    For any further questions please contact Nisreen at [phone]    Please call urology triage line at [phone] or [phone] with fevers or chills, burning with urination, or blood in your urine which lasts more than a 48-72 hours or with any question or concerns.        January 2018 Sunday Monday Tuesday Wednesday Thursday Friday Saturday        1     2     3     UMP POST-OP   11:25 AM   (20 min.)   Aurelio  Faustina RIBEIRO MD   [facility] Urology and Inst for Prostate and Urologic Cancers 4     5     6       7     8     9     10     11     12     13       14     15     16     17     18     19     20       21     22     23     24     25     26     UMP ONC INFUSION 120    1:00 PM   (120 min.)   [facility] ONCOLOGY INFUSION   [facility] 27       28     29     30     31 February 2018 Sunday Monday Tuesday Wednesday Thursday Friday Saturday                       1     2     UMP ONC INFUSION 120   10:00 AM   (120 min.)   UC ONCOLOGY INFUSION   [facility] 3       4     5     6     7     8     9     10       11     12     13     UMP ONC INFUSION 120   10:00 AM   (120 min.)   UC ONCOLOGY INFUSION   [facility] 14     15     16     17       18     19     20     21     22     23     UMP ONC INFUSION 120   10:00 AM   (120 min.)   UC ONCOLOGY INFUSION   [facility] 24       25     26     27     28                                                   Follow-ups after your visit        Your next 10 appointments already scheduled     Feb 02, 2018 10:00 AM CST   Infusion 120 with UC ONCOLOGY INFUSION, UC BED 3 ATC   [facility]    [address] [place] [address]  [address]  [facility] [address]   [phone]            Feb 13, 2018 10:00 AM CST   Infusion 120 with UC ONCOLOGY INFUSION, UC BED 2 ATC   [facility] ([facility])    [address] [place] [address]  [address]  [facility] [address]   [phone]            Feb 23, 2018 10:00 AM CST   Infusion 120 with UC ONCOLOGY INFUSION, UC BED 2 ATC   [facility])    [address] [place] [address]  [address]  [facility] [address]   [phone]            Mar 02, 2018 10:00 AM CST   Infusion 120 with UC ONCOLOGY INFUSION, UC BED 2 ATC   [facility] ([facility])    [address]  [address]  [facility] [address] [phone]            Mar 09, 2018 10:00 AM CST   Infusion 120 with UC ONCOLOGY INFUSION, UC BED 2 ATC   [facility] ([facility])    [address]  [address]  [facility] [id number] [phone]              Who to contact     If you have questions or need follow up information about today's clinic visit or your schedule please contact [facility] directly at [phone].  Normal or non-critical lab and imaging results will be communicated to you by micecloudhart, letter or phone within 4 business days after the clinic has received the results. If you do not hear from us within 7 days, please contact the clinic through [email or web address] or phone. If you have a critical or abnormal lab result, we will notify you by phone as soon as possible.  Submit refill requests through [employer] or call your pharmacy and they will forward the refill request to us. Please allow 3 business days for your refill to be completed.          Additional Information About Your Visit        micecloudhart Information     [employer] gives you secure access to your electronic health record. If you see a primary care provider, you can also send messages to your care team and make appointments. If you have questions, please call your primary care clinic.  If you do not have a primary care provider, please call [phone] and they will assist you.        Care EveryWhere ID     This is your Care EveryWhere ID. This could be used by other organizations to access your [facility] medical records  [id number]        Your Vitals Were     Pulse Temperature Respirations Pulse Oximetry          95 98.5  F (36.9  C) (Oral) 15 98%         Blood Pressure from Last 3 Encounters:   01/26/18 144/77   01/03/18 146/81   12/18/17 (!) 147/100    Weight from Last 3 Encounters:   01/03/18 74.4 kg (164  lb)   12/18/17 74.5 kg (164 lb 3.9 oz)   12/13/17 74.3 kg (163 lb 11.2 oz)              We Performed the Following     UA without Microscopic        Primary Care Provider Office Phone # Fax #    Bee Schaffer [phone]       [facility] [address]  [facility] [address]        Equal Access to Services     CARMINA BARNARD : Hadii aad ku hadasho Soomaali, waaxda luqadaha, qaybta kaalmada adeegyada, waxay idiin hayaan adeeg kharash la'aan ah. So [facility] [phone].    ATENCIÓN: Si almala espnena, tiene a combs disposición servicios gratuitos de asistencia lingüística. Kamlesh al [phone].    We comply with applicable federal civil rights laws and [place] laws. We do not discriminate on the basis of race, color, national origin, age, disability, sex, sexual orientation, or gender identity.            Thank you!     Thank you for choosing [facility]  for your care. Our goal is always to provide you with excellent care. Hearing back from our patients is one way we can continue to improve our services. Please take a few minutes to complete the written survey that you may receive in the mail after your visit with us. Thank you!             Your Updated Medication List - Protect others around you: Learn how to safely use, store and throw away your medicines at www.disposemymeds.org.          This list is accurate as of 1/26/18  2:26 PM.  Always use your most recent med list.                   Brand Name Dispense Instructions for use Diagnosis    glucosamine 500 MG Caps      Take 2 capsules by mouth every morning        levofloxacin 500 MG tablet    LEVAQUIN    12 tablet    Take 1 tablet 6 hours post treatment and 1 tablet each AM following each treatment.    Malignant neoplasm of overlapping sites of bladder (H)       MAGNESIUM OXIDE PO      Take 200 mg by mouth daily        Multiple vitamin Tabs      Take 1 tablet by mouth every morning        sildenafil 100 MG tablet    VIAGRA      Take 0.5-1 Tabs by mouth. Take it one hour before sexual activity.        ZANTAC 150 MG tablet   Generic drug:  ranitidine      Take 150 mg by mouth every morning

## 2018-01-26 NOTE — PROGRESS NOTES
Infusion Nursing Note:  Pt presents today for BCG bladder instillation induction dose 1 of 6.     present during visit today: Not Applicable.  Patient seen by Dr. Faustina Carey prior to infusion.    Patient new to oncology infusion room and is receiving BCG for the first time.  Pt oriented to infusion room, location of bathrooms, nutrition stations, and call light. New patient teaching done previously. Pt received new pt folder prior to coming to infusion. Writer reinforced teaching in infusion. All medications and side effects reviewed with patient.      Note: Patient first time having BCG bladder instillation treatment.  Went over all of the instructions including; bleach in toilet for first 6 hours after first void of BCG, turning every 15 minutes for 1 hour (keep BCG in bladder for 2 hours if possible), antibiotics, and possible side effects. Patient denies any fever/chills, dysuria, or gross hematuria.     UA RESULTS:  Recent Labs   Lab Test  01/26/18   1300  12/13/17   0952   COLOR  Yellow  Yellow   APPEARANCE  Clear  Clear   URINEGLC  Negative  Negative   URINEBILI  Negative  Negative   URINEKETONE  Negative  Negative   SG  1.011  1.012   UBLD  Negative  Negative   URINEPH  7.0  6.0   PROTEIN  Negative  30*   NITRITE  Negative  Negative   LEUKEST  Negative  Trace*   RBCU   --   2   WBCU   --   12*        Results reviewed, labs MET treatment parameters, ok to proceed with treatment.      Pre-procedure Assessment:  Dysuria: No  Hematuria: No  Fever/chills: No  Increased urinary urgency: No  Increased urinary frequency: No    Lidocaine urojet 2% 10 ml used: YES  Catheter placed using sterile technique: 14 Bahraini red blackburn straight catheter    Post-procedure Assessment:  Patient tolerated bladder instillation without issues.    Patient instructed on the following and verbalized understanding:   Medication to remain in the bladder for 2 hours post instillation: YES  Post instillation medication  side effects and precautions discussed: YES    Discharge Plan:   Discharge instructions reviewed with: Patient.  Patient and/or family verbalized understanding of discharge instructions and all questions answered.  Copy of AVS reviewed with patient and/or family.  Patient will return 2/2/18 for next appointment.  Patient discharged in stable condition accompanied by: self.  Departure Mode: Ambulatory.    Nisreen Philip RN

## 2018-02-02 ENCOUNTER — INFUSION THERAPY VISIT (OUTPATIENT)
Dept: ONCOLOGY | Facility: CLINIC | Age: 78
End: 2018-02-02
Attending: UROLOGY
Payer: COMMERCIAL

## 2018-02-02 VITALS
TEMPERATURE: 98.3 F | RESPIRATION RATE: 15 BRPM | OXYGEN SATURATION: 97 % | HEART RATE: 80 BPM | SYSTOLIC BLOOD PRESSURE: 173 MMHG | DIASTOLIC BLOOD PRESSURE: 103 MMHG

## 2018-02-02 DIAGNOSIS — C67.8 MALIGNANT NEOPLASM OF OVERLAPPING SITES OF BLADDER (H): Primary | ICD-10-CM

## 2018-02-02 LAB
ALBUMIN UR-MCNC: 30 MG/DL
APPEARANCE UR: ABNORMAL
BILIRUB UR QL STRIP: NEGATIVE
COLOR UR AUTO: YELLOW
GLUCOSE UR STRIP-MCNC: NEGATIVE MG/DL
HGB UR QL STRIP: NEGATIVE
KETONES UR STRIP-MCNC: NEGATIVE MG/DL
LEUKOCYTE ESTERASE UR QL STRIP: NEGATIVE
NITRATE UR QL: NEGATIVE
PH UR STRIP: 6 PH (ref 5–7)
SOURCE: ABNORMAL
SP GR UR STRIP: 1.01 (ref 1–1.03)
UROBILINOGEN UR STRIP-MCNC: 0 MG/DL (ref 0–2)

## 2018-02-02 PROCEDURE — 25000125 ZZHC RX 250: Mod: ZF | Performed by: UROLOGY

## 2018-02-02 PROCEDURE — 51720 TREATMENT OF BLADDER LESION: CPT

## 2018-02-02 PROCEDURE — 25000128 H RX IP 250 OP 636: Mod: ZF | Performed by: UROLOGY

## 2018-02-02 PROCEDURE — 81003 URINALYSIS AUTO W/O SCOPE: CPT | Performed by: UROLOGY

## 2018-02-02 RX ADMIN — BACILLUS CALMETTE-GUERIN 50 MG: 50 POWDER, FOR SUSPENSION INTRAVESICAL at 10:49

## 2018-02-02 RX ADMIN — LIDOCAINE HYDROCHLORIDE 10 ML: 20 JELLY TOPICAL at 10:34

## 2018-02-02 ASSESSMENT — PAIN SCALES - GENERAL: PAINLEVEL: NO PAIN (0)

## 2018-02-02 NOTE — MR AVS SNAPSHOT
After Visit Summary   2/2/2018    Max Zuñiga    MRN: [id number]           Patient Information     Date Of Birth          1940        Visit Information        Provider Department      2/2/2018 10:00 AM UC BED 3 ATC; UC ONCOLOGY INFUSION [facility]        Today's Diagnoses     Malignant neoplasm of overlapping sites of bladder (H)    -  1       Follow-ups after your visit        Your next 10 appointments already scheduled     Feb 13, 2018 10:00 AM CST   Infusion 120 with UC ONCOLOGY INFUSION, UC BED 2 ATC   [facility] ([facility])    [address]  [address]  [place] [facility] [address]   [phone]            Feb 23, 2018 10:00 AM CST   Infusion 120 with UC ONCOLOGY INFUSION, UC BED 2 ATC   [facility] ([facility])    [address]  [address]  [place] [facility] [address] [phone]            Mar 02, 2018 10:00 AM CST   Infusion 120 with UC ONCOLOGY INFUSION, UC BED 2 ATC   [facility] ([facility])    [address]  [address]  [place] [facility] [address] [phone]            Mar 09, 2018 10:00 AM CST   Infusion 120 with UC ONCOLOGY INFUSION, UC BED 2 ATC   [facility] ([facility])    [address]  [address]  [place] [facility] [address] [phone]            May 02, 2018  1:00 PM CDT   (Arrive by 12:45 PM)   Cystoscopy with Faustina Carey MD   [facility] Prostate and Urologic Cancers ([facility])    [address] [place] [address]  4th Floor  [facility] [address] [phone]              Who to contact     If you have questions or need follow up information about today's clinic visit or your schedule please contact [facility] directly at [phone].  Normal or non-critical lab and imaging results will  be communicated to you by MyChart, letter or phone within 4 business days after the clinic has received the results. If you do not hear from us within 7 days, please contact the clinic through [employer] or phone. If you have a critical or abnormal lab result, we will notify you by phone as soon as possible.  Submit refill requests through [employer] or call your pharmacy and they will forward the refill request to us. Please allow 3 business days for your refill to be completed.          Additional Information About Your Visit        [employer] Information     [employer] gives you secure access to your electronic health record. If you see a primary care provider, you can also send messages to your care team and make appointments. If you have questions, please call your primary care clinic.  If you do not have a primary care provider, please call [phone] and they will assist you.        Care EveryWhere ID     This is your Care EveryWhere ID. This could be used by other organizations to access your [facility] medical records  [id number]        Your Vitals Were     Pulse Temperature Respirations Pulse Oximetry          80 98.3  F (36.8  C) (Oral) 15 97%         Blood Pressure from Last 3 Encounters:   02/02/18 (!) 173/103   01/26/18 144/77   01/03/18 146/81    Weight from Last 3 Encounters:   01/03/18 74.4 kg (164 lb)   12/18/17 74.5 kg (164 lb 3.9 oz)   12/13/17 74.3 kg (163 lb 11.2 oz)              We Performed the Following     UA without Microscopic        Primary Care Provider Office Phone # Fax #    Bee Sarbjit [phone]       [facility] [address] [facility] [address]        Equal Access to Services     [facility] : Hadii maryam kahn Sotyrone, waaxda luqadaha, qaybta kaalmada saray griggs. So [facility] [phone].    ATENCIÓN: Si habla español, tiene a combs disposición servicios gratuitos de asistencia lingüística. Llame al  [phone].    We comply with applicable federal civil rights laws and [place] laws. We do not discriminate on the basis of race, color, national origin, age, disability, sex, sexual orientation, or gender identity.            Thank you!     Thank you for choosing [facility]  for your care. Our goal is always to provide you with excellent care. Hearing back from our patients is one way we can continue to improve our services. Please take a few minutes to complete the written survey that you may receive in the mail after your visit with us. Thank you!             Your Updated Medication List - Protect others around you: Learn how to safely use, store and throw away your medicines at www.disposemymeds.org.          This list is accurate as of 2/2/18 12:41 PM.  Always use your most recent med list.                   Brand Name Dispense Instructions for use Diagnosis    glucosamine 500 MG Caps      Take 2 capsules by mouth every morning        levofloxacin 500 MG tablet    LEVAQUIN    12 tablet    Take 1 tablet 6 hours post treatment and 1 tablet each AM following each treatment.    Malignant neoplasm of overlapping sites of bladder (H)       MAGNESIUM OXIDE PO      Take 200 mg by mouth daily        Multiple vitamin Tabs      Take 1 tablet by mouth every morning        sildenafil 100 MG tablet    VIAGRA     Take 0.5-1 Tabs by mouth. Take it one hour before sexual activity.        ZANTAC 150 MG tablet   Generic drug:  ranitidine      Take 150 mg by mouth every morning

## 2018-02-02 NOTE — PROGRESS NOTES
Infusion Nursing Note:  Max Zuñiga presents today for BCG bladder instillation induction dose 2 of 6.      Note: Patient states being able to hold BCG for 2 hours post last treatment.  Patient denies any fever/chills, dysuria or gross hematuria.    UA RESULTS:  Recent Labs   Lab Test  02/02/18   1000   12/13/17   0952   COLOR  Yellow   < >  Yellow   APPEARANCE  Slightly Cloudy   < >  Clear   URINEGLC  Negative   < >  Negative   URINEBILI  Negative   < >  Negative   URINEKETONE  Negative   < >  Negative   SG  1.011   < >  1.012   UBLD  Negative   < >  Negative   URINEPH  6.0   < >  6.0   PROTEIN  30*   < >  30*   NITRITE  Negative   < >  Negative   LEUKEST  Negative   < >  Trace*   RBCU   --    --   2   WBCU   --    --   12*    < > = values in this interval not displayed.       Treatment Conditions:  Results reviewed, labs MET treatment parameters, ok to proceed with treatment.    Pre-procedure Assessment:  Dysuria: No  Hematuria: No  Fever/chills: No  Increased urinary urgency: No  Increased urinary frequency: No    Lidocaine urojet 2% 10 ml used: YES  Catheter placed using sterile technique: 14 Setswana red blackburn straight catheter    Post-procedure Assessment:  Patient tolerated bladder instillation without issues.    Patient instructed on the following and verbalized understanding:   Medication to remain in the bladder for 2 hours post instillation: YES  Post instillation side effects and precautions discussed: YES    Discharge Plan:   Discharge instructions reviewed with: Patient.  Patient and/or family verbalized understanding of discharge instructions and all questions answered.  Copy of AVS reviewed with patient and/or family.  Patient will return 2/13/18 for next appointment.  Patient discharged in stable condition accompanied by: self.  Departure Mode: Ambulatory.    Nisreen Philip RN

## 2018-02-13 ENCOUNTER — INFUSION THERAPY VISIT (OUTPATIENT)
Dept: ONCOLOGY | Facility: CLINIC | Age: 78
End: 2018-02-13
Attending: UROLOGY
Payer: COMMERCIAL

## 2018-02-13 VITALS
RESPIRATION RATE: 15 BRPM | DIASTOLIC BLOOD PRESSURE: 87 MMHG | TEMPERATURE: 97.6 F | SYSTOLIC BLOOD PRESSURE: 166 MMHG | HEART RATE: 69 BPM | OXYGEN SATURATION: 96 %

## 2018-02-13 DIAGNOSIS — C67.8 MALIGNANT NEOPLASM OF OVERLAPPING SITES OF BLADDER (H): Primary | ICD-10-CM

## 2018-02-13 PROCEDURE — 51720 TREATMENT OF BLADDER LESION: CPT

## 2018-02-13 PROCEDURE — 81003 URINALYSIS AUTO W/O SCOPE: CPT | Performed by: UROLOGY

## 2018-02-13 PROCEDURE — 25000125 ZZHC RX 250: Mod: ZF | Performed by: UROLOGY

## 2018-02-13 PROCEDURE — 25000128 H RX IP 250 OP 636: Mod: ZF | Performed by: UROLOGY

## 2018-02-13 RX ADMIN — LIDOCAINE HYDROCHLORIDE 10 ML: 20 JELLY TOPICAL at 11:13

## 2018-02-13 RX ADMIN — BACILLUS CALMETTE-GUERIN 50 MG: 50 POWDER, FOR SUSPENSION INTRAVESICAL at 11:37

## 2018-02-13 ASSESSMENT — PAIN SCALES - GENERAL: PAINLEVEL: NO PAIN (0)

## 2018-02-13 NOTE — PROGRESS NOTES
Infusion Nursing Note:  Max Zuñiga presents today for BCG bladder instillation induction dose 3 of 6.      Note: Patient states being able to hold BCG for 2 hours post last treatment.  Patient denies any fever/chills, dysuria or gross hematuria.    UA RESULTS:  Recent Labs   Lab Test  02/13/18   1023   12/13/17   0952   COLOR  Yellow   < >  Yellow   APPEARANCE  Clear   < >  Clear   URINEGLC  Negative   < >  Negative   URINEBILI  Negative   < >  Negative   URINEKETONE  Negative   < >  Negative   SG  1.009   < >  1.012   UBLD  Negative   < >  Negative   URINEPH  7.0   < >  6.0   PROTEIN  Negative   < >  30*   NITRITE  Negative   < >  Negative   LEUKEST  Negative   < >  Trace*   RBCU   --    --   2   WBCU   --    --   12*    < > = values in this interval not displayed.       Treatment Conditions:  Results reviewed, labs MET treatment parameters, ok to proceed with treatment.    Pre-procedure Assessment:  Dysuria: No  Hematuria: No  Fever/chills: No  Increased urinary urgency: No  Increased urinary frequency: No    Lidocaine urojet 2% 10 ml used: YES  Catheter placed using sterile technique: 14 Bengali red blackburn straight catheter    Post-procedure Assessment:  Patient tolerated bladder instillation without issues.    Patient instructed on the following and verbalized understanding:   Medication to remain in the bladder for 2 hours post instillation: YES  Post instillation side effects and precautions discussed: YES    Discharge Plan:   Discharge instructions reviewed with: Patient.  Patient and/or family verbalized understanding of discharge instructions and all questions answered.  Copy of AVS reviewed with patient and/or family.  Patient will return 2/23/18 for next appointment.  Patient discharged in stable condition accompanied by: self.  Departure Mode: Ambulatory.    Nisreen Philip RN

## 2018-02-13 NOTE — MR AVS SNAPSHOT
After Visit Summary   2/13/2018    Max Zuñiga    MRN: [id number]           Patient Information     Date Of Birth          1940        Visit Information        Provider Department      2/13/2018 10:00 AM UC BED 2 ATC; UC ONCOLOGY INFUSION [facility]        Today's Diagnoses     Malignant neoplasm of overlapping sites of bladder (H)    -  1       Follow-ups after your visit        Your next 10 appointments already scheduled     Feb 23, 2018 10:00 AM CST   Infusion 120 with UC ONCOLOGY INFUSION, UC BED 2 ATC   [facility] ([facility])    [address]  [address]  [place] [place] [facility] [address] [phone]            Mar 02, 2018 10:00 AM CST   Infusion 120 with UC ONCOLOGY INFUSION, UC BED 2 ATC   [facility] ([facility])    [address]  [address]  [place] [place] [facility] [address] [phone]            Mar 09, 2018 10:00 AM CST   Infusion 120 with UC ONCOLOGY INFUSION, UC BED 2 ATC   [facility] ([facility])    [address]  [address]  [facility] [place] [facility] [address] [phone]            May 02, 2018  1:00 PM CDT   (Arrive by 12:45 PM)   Cystoscopy with Faustina Carey MD   [facility] Urologic Cancers ([facility])    [address]  4th Floor  [facility] [address] [phone]              Who to contact     If you have questions or need follow up information about today's clinic visit or your schedule please contact [facility] directly at [phone].  Normal or non-critical lab and imaging results will be communicated to you by MyChart, letter or phone within 4 business days after the clinic has received the results. If you do not hear from us within 7 days, please contact the clinic through MyChart or phone. If you have a critical or  abnormal lab result, we will notify you by phone as soon as possible.  Submit refill requests through [employer] or call your pharmacy and they will forward the refill request to us. Please allow 3 business days for your refill to be completed.          Additional Information About Your Visit        [employer] Information     [employer] gives you secure access to your electronic health record. If you see a primary care provider, you can also send messages to your care team and make appointments. If you have questions, please call your primary care clinic.  If you do not have a primary care provider, please call [phone] and they will assist you.        Care EveryWhere ID     This is your Care EveryWhere ID. This could be used by other organizations to access your [facility] medical records  [id number]        Your Vitals Were     Pulse Temperature Respirations Pulse Oximetry          69 97.6  F (36.4  C) (Oral) 15 96%         Blood Pressure from Last 3 Encounters:   02/13/18 166/87   02/02/18 (!) 173/103   01/26/18 144/77    Weight from Last 3 Encounters:   01/03/18 74.4 kg (164 lb)   12/18/17 74.5 kg (164 lb 3.9 oz)   12/13/17 74.3 kg (163 lb 11.2 oz)              We Performed the Following     UA without Microscopic        Primary Care Provider Office Phone # Fax #    Bee Schaffer [phone]       [facility] [address] [place] [address]        Equal Access to Services     CARMINA BARANRD AH: Hadii aad ku hadasho Soomaali, waaxda luqadaha, qaybta kaalmada adeegyada, saray berry . So [facility] [phone].    ATENCIÓN: Si habla español, tiene a combs disposición servicios gratuitos de asistencia lingüística. Llame al [phone].    We comply with applicable federal civil rights laws and [place] laws. We do not discriminate on the basis of race, color, national origin, age, disability, sex, sexual orientation, or gender identity.            Thank you!     Thank you  for choosing [facility]  for your care. Our goal is always to provide you with excellent care. Hearing back from our patients is one way we can continue to improve our services. Please take a few minutes to complete the written survey that you may receive in the mail after your visit with us. Thank you!             Your Updated Medication List - Protect others around you: Learn how to safely use, store and throw away your medicines at www.disposemymeds.org.          This list is accurate as of 2/13/18 12:07 PM.  Always use your most recent med list.                   Brand Name Dispense Instructions for use Diagnosis    glucosamine 500 MG Caps      Take 2 capsules by mouth every morning        levofloxacin 500 MG tablet    LEVAQUIN    12 tablet    Take 1 tablet 6 hours post treatment and 1 tablet each AM following each treatment.    Malignant neoplasm of overlapping sites of bladder (H)       MAGNESIUM OXIDE PO      Take 200 mg by mouth daily        Multiple vitamin Tabs      Take 1 tablet by mouth every morning        OMEPRAZOLE PO      Take 20 mg by mouth daily        sildenafil 100 MG tablet    VIAGRA     Take 0.5-1 Tabs by mouth. Take it one hour before sexual activity.

## 2018-02-23 ENCOUNTER — INFUSION THERAPY VISIT (OUTPATIENT)
Dept: ONCOLOGY | Facility: CLINIC | Age: 78
End: 2018-02-23
Attending: UROLOGY
Payer: COMMERCIAL

## 2018-02-23 VITALS
TEMPERATURE: 98.1 F | DIASTOLIC BLOOD PRESSURE: 80 MMHG | HEART RATE: 86 BPM | OXYGEN SATURATION: 96 % | SYSTOLIC BLOOD PRESSURE: 164 MMHG | RESPIRATION RATE: 18 BRPM

## 2018-02-23 DIAGNOSIS — C67.8 MALIGNANT NEOPLASM OF OVERLAPPING SITES OF BLADDER (H): Primary | ICD-10-CM

## 2018-02-23 LAB
ALBUMIN UR-MCNC: NEGATIVE MG/DL
APPEARANCE UR: CLEAR
BILIRUB UR QL STRIP: NEGATIVE
COLOR UR AUTO: YELLOW
GLUCOSE UR STRIP-MCNC: NEGATIVE MG/DL
HGB UR QL STRIP: NEGATIVE
KETONES UR STRIP-MCNC: NEGATIVE MG/DL
LEUKOCYTE ESTERASE UR QL STRIP: NEGATIVE
NITRATE UR QL: NEGATIVE
PH UR STRIP: 6 PH (ref 5–7)
SOURCE: NORMAL
SP GR UR STRIP: 1.01 (ref 1–1.03)
UROBILINOGEN UR STRIP-MCNC: 0 MG/DL (ref 0–2)

## 2018-02-23 PROCEDURE — 25000128 H RX IP 250 OP 636: Mod: ZF | Performed by: UROLOGY

## 2018-02-23 PROCEDURE — 25000125 ZZHC RX 250: Mod: ZF | Performed by: UROLOGY

## 2018-02-23 PROCEDURE — 81003 URINALYSIS AUTO W/O SCOPE: CPT | Performed by: UROLOGY

## 2018-02-23 PROCEDURE — 51720 TREATMENT OF BLADDER LESION: CPT

## 2018-02-23 RX ADMIN — BACILLUS CALMETTE-GUERIN 50 MG: 50 POWDER, FOR SUSPENSION INTRAVESICAL at 10:52

## 2018-02-23 RX ADMIN — LIDOCAINE HYDROCHLORIDE 10 ML: 20 JELLY TOPICAL at 10:47

## 2018-02-23 ASSESSMENT — PAIN SCALES - GENERAL: PAINLEVEL: NO PAIN (0)

## 2018-02-23 NOTE — PATIENT INSTRUCTIONS
Home discharge instructions:  -To make sure each treatment has the best chance of working, follow these steps 2 hours after each treatment   1. Lie on your back for 15 minues   2. Lie on your left side for 15 mintues   3. Lie on your right side for 15 minutes   4. Get up but keep the medication  In your bladder for 60 more minutes- for a total of 2 hours   5. Empty your bladder following the directions below (if you have difficulty holding your bladder it is ok to empty your bladder early)  -Wear pad if incontinence is a possibliity  -Wash hands throughly after using the bathroom  -For safety reasons remember to follow these directions for 6 hours after each treatment:   1. Sit on the toilet seat to urinate   2. Immediately after urinating- add 2 cups of undiluted chlorine bleach to the toilet    3. Close lid and let the bleach sit for 15 minutes each time   4. Drink plenty of water to flush any remaining medication out of your bladder    **These steps will help kill all the BCG bacteria and disinfect the toilet**    Please void BCG at 1:05pm.    Take your antibiotic today at 5:05 pm and tomorrow morning.    For any further questions please contact Nisreen at [phone]    Please call urology triage line at [phone] or [phone] with fevers or chills, burning with urination, or blood in your urine which lasts more than a 48-72 hours or with any question or concerns.        February 2018 Sunday Monday Tuesday Wednesday Thursday Friday Saturday                       1     2     [facility] ONC INFUSION 120   10:00 AM   (120 min.)    ONCOLOGY INFUSION   [facility] 3       4     5     6     7     8     9     10       11     12     13     [facility] ONC INFUSION 120   10:00 AM   (120 min.)    ONCOLOGY INFUSION   [facility] 14     15     16     17       18     19     20     21     22     23     [facility] ONC INFUSION 120   10:00 AM   (120 min.)    ONCOLOGY INFUSION   [facility] 24       25     26     27     28                               March 2018 Sunday Monday Tuesday Wednesday Thursday Friday Saturday                       1     2     [facility] ONC INFUSION 120   10:00 AM   (120 min.)    ONCOLOGY INFUSION   [facility] 3       4     5     6     7     8     9     [facility] ONC INFUSION 120   10:00 AM   (120 min.)    ONCOLOGY INFUSION   [facility] 10       11     12     13     14     15     16     17       18     19     20     21     22     23     24       25     26     27     28     29     30     31

## 2018-02-23 NOTE — PROGRESS NOTES
"Infusion Nursing Note:  Max Zuñiga presents today for BCG bladder instillation induction dose 4 of 6.    Patient seen by provider today: No    Note: Pt states holding BCG for 2 hours post previous treatment. Pt states mild frequency and \"tingling\" on day of treatment with last dose; this has resolved now. Pt denies fever, chills, dysuria, gross hematuria today.      UA RESULTS:  Recent Labs   Lab Test  02/23/18   1006   12/13/17   0952   COLOR  Yellow   < >  Yellow   APPEARANCE  Clear   < >  Clear   URINEGLC  Negative   < >  Negative   URINEBILI  Negative   < >  Negative   URINEKETONE  Negative   < >  Negative   SG  1.010   < >  1.012   UBLD  Negative   < >  Negative   URINEPH  6.0   < >  6.0   PROTEIN  Negative   < >  30*   NITRITE  Negative   < >  Negative   LEUKEST  Negative   < >  Trace*   RBCU   --    --   2   WBCU   --    --   12*    < > = values in this interval not displayed.         Treatment Conditions:  Results reviewed, labs MET treatment parameters, ok to proceed with treatment.    Pre-procedure Assessment:  Dysuria:  No  Hematuria:  No  Fever/chills:  No  Increased urinary urgency:  No  Increased urinary frequency:  No    Lidocaine urojet 2% 10 ml used: YES  Catheter placed using sterile technique: 14 Burmese red blackburn straight cath    Post-procedure Assessment:  Pt tolerated bladder instillation without issues.    Patient instructed on the following and verbalized understanding:   Medication to remain in the bladder for 2 hours post instillation: YES  Post instillation side effects and precautions discussed: YES    Discharge Plan:   Patient and/or family verbalized understanding of discharge instructions and all questions answered.  AVS to patient via [employer].  Patient will return 3/2 for next appointment.   Patient discharged in stable condition accompanied by: self.  Departure Mode: Ambulatory.    PRATEEK ALTAMIRANO RN                            "

## 2018-02-23 NOTE — MR AVS SNAPSHOT
After Visit Summary   2/23/2018    Max Zuñiga    MRN: [id number]           Patient Information     Date Of Birth          1940        Visit Information        Provider Department      2/23/2018 10:00 AM  BED 2 ATC;  ONCOLOGY INFUSION [facility]        Today's Diagnoses     Malignant neoplasm of overlapping sites of bladder (H)    -  1      Care Instructions    Home discharge instructions:  -To make sure each treatment has the best chance of working, follow these steps 2 hours after each treatment   1. Lie on your back for 15 minues   2. Lie on your left side for 15 mintues   3. Lie on your right side for 15 minutes   4. Get up but keep the medication  In your bladder for 60 more minutes- for a total of 2 hours   5. Empty your bladder following the directions below (if you have difficulty holding your bladder it is ok to empty your bladder early)  -Wear pad if incontinence is a possibliity  -Wash hands throughly after using the bathroom  -For safety reasons remember to follow these directions for 6 hours after each treatment:   1. Sit on the toilet seat to urinate   2. Immediately after urinating- add 2 cups of undiluted chlorine bleach to the toilet    3. Close lid and let the bleach sit for 15 minutes each time   4. Drink plenty of water to flush any remaining medication out of your bladder    **These steps will help kill all the BCG bacteria and disinfect the toilet**    Please void BCG at 1:05pm.    Take your antibiotic today at 5:05 pm and tomorrow morning.    For any further questions please contact [facility] at [phone]    Please call urology triage line at [phone] or [phone] with fevers or chills, burning with urination, or blood in your urine which lasts more than a 48-72 hours or with any question or concerns.        February 2018 Sunday Monday Tuesday Wednesday Thursday Friday Saturday                       1     2     UMP ONC INFUSION 120   10:00  AM   (120 min.)   UC ONCOLOGY INFUSION   [facility] 3       4     5     6     7     8     9     10       11     12     13     UMP ONC INFUSION 120   10:00 AM   (120 min.)   UC ONCOLOGY INFUSION   [facility] 14     15     16     17       18     19     20     21     22     23     UMP ONC INFUSION 120   10:00 AM   (120 min.)   UC ONCOLOGY INFUSION   [facility] 24       25     26 27 28 March 2018 Sunday Monday Tuesday Wednesday Thursday Friday Saturday                       1     2     UMP ONC INFUSION 120   10:00 AM   (120 min.)   UC ONCOLOGY INFUSION   [facility] 3       4     5     6     7     8     9     UMP ONC INFUSION 120   10:00 AM   (120 min.)   UC ONCOLOGY INFUSION   [facility] 10       11     12     13     14     15     16     17       18     19     20     21     22     23     24       25     26     27     28     29     30     31                                  Follow-ups after your visit        Your next 10 appointments already scheduled     Mar 02, 2018 10:00 AM CST   Infusion 120 with UC ONCOLOGY INFUSION, UC BED 2 ATC   [facility]    [address]  [address]  [place] [address]   [phone]            Mar 09, 2018 10:00 AM CST   Infusion 120 with UC ONCOLOGY INFUSION, UC BED 2 ATC   [facility]    [address]  [address]  [place] [address]   [phone]            May 02, 2018  1:00 PM CDT   (Arrive by 12:45 PM)   Cystoscopy with Faustina Carey MD   [facility]rologic C[facility] ([facility])    [address]  4th Floor  [place] [address] [phone]              Who to contact     If you have questions or need follow up information about today's  clinic visit or your schedule please contact [facility] directly at [phone].  Normal or non-critical lab and imaging results will be communicated to you by MyChart, letter or phone within 4 business days after the clinic has received the results. If you do not hear from us within 7 days, please contact the clinic through [employer] or phone. If you have a critical or abnormal lab result, we will notify you by phone as soon as possible.  Submit refill requests through [employer] or call your pharmacy and they will forward the refill request to us. Please allow 3 business days for your refill to be completed.          Additional Information About Your Visit        [employer] Information     [employer] gives you secure access to your electronic health record. If you see a primary care provider, you can also send messages to your care team and make appointments. If you have questions, please call your primary care clinic.  If you do not have a primary care provider, please call [phone] and they will assist you.        Care EveryWhere ID     This is your Care EveryWhere ID. This could be used by other organizations to access your [place] medical records  [id number]        Your Vitals Were     Pulse Temperature Respirations Pulse Oximetry          86 98.1  F (36.7  C) (Oral) 18 96%         Blood Pressure from Last 3 Encounters:   02/23/18 164/80   02/13/18 166/87   02/02/18 (!) 173/103    Weight from Last 3 Encounters:   01/03/18 74.4 kg (164 lb)   12/18/17 74.5 kg (164 lb 3.9 oz)   12/13/17 74.3 kg (163 lb 11.2 oz)              We Performed the Following     UA without Microscopic        Primary Care Provider Office Phone # Fax #    Bee Schaffer [phone]       [facility] [address] [facility] [address]        Equal Access to Services     CARMINA BARNARD : Zechariah Dawson, wabrijesh marquis, qaybta kakarla griggs, saray barfield  laemil cornejo So [facility] [phone].    ATENCIÓN: Si habla trice, tiene a combs disposición servicios gratuitos de asistencia lingüística. Kamlesh al [phone].    We comply with applicable federal civil rights laws and [place] laws. We do not discriminate on the basis of race, color, national origin, age, disability, sex, sexual orientation, or gender identity.            Thank you!     Thank you for choosing [facility]  for your care. Our goal is always to provide you with excellent care. Hearing back from our patients is one way we can continue to improve our services. Please take a few minutes to complete the written survey that you may receive in the mail after your visit with us. Thank you!             Your Updated Medication List - Protect others around you: Learn how to safely use, store and throw away your medicines at www.disposemymeds.org.          This list is accurate as of 2/23/18 11:20 AM.  Always use your most recent med list.                   Brand Name Dispense Instructions for use Diagnosis    glucosamine 500 MG Caps      Take 2 capsules by mouth every morning        levofloxacin 500 MG tablet    LEVAQUIN    12 tablet    Take 1 tablet 6 hours post treatment and 1 tablet each AM following each treatment.    Malignant neoplasm of overlapping sites of bladder (H)       MAGNESIUM OXIDE PO      Take 200 mg by mouth daily        Multiple vitamin Tabs      Take 1 tablet by mouth every morning        OMEPRAZOLE PO      Take 20 mg by mouth daily        sildenafil 100 MG tablet    VIAGRA     Take 0.5-1 Tabs by mouth. Take it one hour before sexual activity.

## 2018-03-02 ENCOUNTER — INFUSION THERAPY VISIT (OUTPATIENT)
Dept: ONCOLOGY | Facility: CLINIC | Age: 78
End: 2018-03-02
Attending: UROLOGY
Payer: COMMERCIAL

## 2018-03-02 VITALS
TEMPERATURE: 97.8 F | HEART RATE: 73 BPM | RESPIRATION RATE: 16 BRPM | DIASTOLIC BLOOD PRESSURE: 95 MMHG | OXYGEN SATURATION: 98 % | SYSTOLIC BLOOD PRESSURE: 162 MMHG

## 2018-03-02 DIAGNOSIS — C67.8 MALIGNANT NEOPLASM OF OVERLAPPING SITES OF BLADDER (H): Primary | ICD-10-CM

## 2018-03-02 PROCEDURE — 25000128 H RX IP 250 OP 636: Mod: ZF | Performed by: UROLOGY

## 2018-03-02 PROCEDURE — 25000125 ZZHC RX 250: Mod: ZF | Performed by: UROLOGY

## 2018-03-02 PROCEDURE — 51720 TREATMENT OF BLADDER LESION: CPT

## 2018-03-02 PROCEDURE — 81003 URINALYSIS AUTO W/O SCOPE: CPT | Performed by: UROLOGY

## 2018-03-02 RX ADMIN — LIDOCAINE HYDROCHLORIDE 10 ML: 20 JELLY TOPICAL at 10:26

## 2018-03-02 RX ADMIN — BACILLUS CALMETTE-GUERIN 50 MG: 50 POWDER, FOR SUSPENSION INTRAVESICAL at 10:53

## 2018-03-02 ASSESSMENT — PAIN SCALES - GENERAL: PAINLEVEL: NO PAIN (0)

## 2018-03-02 NOTE — PATIENT INSTRUCTIONS
Home discharge instructions:  -To make sure each treatment has the best chance of working, follow these steps 2 hours after each treatment   1. Lie on your back for 15 minues   2. Lie on your left side for 15 mintues   3. Lie on your right side for 15 minutes   4. Get up but keep the medication  In your bladder for 60 more minutes- for a total of 2 hours   5. Empty your bladder following the directions below (if you have difficulty holding your bladder it is ok to empty your bladder early)  -Wear pad if incontinence is a possibliity  -Wash hands throughly after using the bathroom  -For safety reasons remember to follow these directions for 6 hours after each treatment:   1. Sit on the toilet seat to urinate   2. Immediately after urinating- add 2 cups of undiluted chlorine bleach to the toilet    3. Close lid and let the bleach sit for 15 minutes each time   4. Drink plenty of water to flush any remaining medication out of your bladder    **These steps will help kill all the BCG bacteria and disinfect the toilet**    Please void BCG at 11am    Take your antibiotic today at 5pm pm and tomorrow morning.    For any further questions please contact [facility] at [phone]    Please call urology triage line at [phone] or [phone] with fevers or chills, burning with urination, or blood in your urine which lasts more than a 48-72 hours or with any question or concerns.        March 2018 Sunday Monday Tuesday Wednesday Thursday Friday Saturday                       1     2     UMP ONC INFUSION 120   10:00 AM   (120 min.)    ONCOLOGY INFUSION   [facility] 3       4     5     6     7     8     9     UMP ONC INFUSION 120   10:00 AM   (120 min.)    ONCOLOGY INFUSION   [facility] 10       11     12     13     14     15     16     17       18     19     20     21     22     23     24       25     26     27     28     29     30     31                April 2018 Sunday Monday  Tuesday Wednesday Thursday Friday Saturday   1     2     3     4     5     6     7       8     9     10     11     12     13     14       15     16     17     18     19     20     21       22     23     24     25     26     27     28       29     30

## 2018-03-02 NOTE — PROGRESS NOTES
Infusion Nursing Note:  Max Zuñiga presents today for BCG Bladder instillation dose 5 of 6.    Patient seen by provider today: No    Note: Denies fevers chills or dysuria currently.  Did have some burning post his last treatment on that day.  No visible blood seen on urine sample sent today.  Has plenty of Levaquin.      Treatment Conditions:  UA RESULTS:  Recent Labs   Lab Test  03/02/18   0945   12/13/17   0952   COLOR  Yellow   < >  Yellow   APPEARANCE  Clear   < >  Clear   URINEGLC  Negative   < >  Negative   URINEBILI  Negative   < >  Negative   URINEKETONE  Negative   < >  Negative   SG  1.009   < >  1.012   UBLD  Negative   < >  Negative   URINEPH  6.0   < >  6.0   PROTEIN  Negative   < >  30*   NITRITE  Negative   < >  Negative   LEUKEST  Negative   < >  Trace*   RBCU   --    --   2   WBCU   --    --   12*    < > = values in this interval not displayed.     .    Pre-procedure Assessment:  Dysuria:  No  Hematuria:  No  Fever/chills:  No  Increased urinary urgency: No  Increased urinary frequency: No    Lidocaine urojet 2% 10 ml used: YES  Catheter placed using sterile technique: 14 Danish red blackburn straight cath.    Post-procedure Assessment:  Patient tolerated bladder instillation without incident.    Patient instructed on the following and verbalized understanding:   Medication to remain in the bladder for 2 hours post instillation: YES  Post instillation side effects and precautions discussed: YES    Discharge Plan:   Patient declined prescription refills.  Copy of AVS reviewed with patient and/or family.  Patient will return 3/9/18 for next appointment.  Patient discharged in stable condition accompanied by: self.  Departure Mode: Ambulatory.  Face to Face time: 0.    Tamera Chowdhury RN

## 2018-03-02 NOTE — MR AVS SNAPSHOT
After Visit Summary   3/2/2018    Max Zuñiga    MRN: [id number]           Patient Information     Date Of Birth          1940        Visit Information        Provider Department      3/2/2018 10:00 AM  BED 2 ATC;  ONCOLOGY INFUSION [facility]        Today's Diagnoses     Malignant neoplasm of overlapping sites of bladder (H)    -  1      Care Instructions    Home discharge instructions:  -To make sure each treatment has the best chance of working, follow these steps 2 hours after each treatment   1. Lie on your back for 15 minues   2. Lie on your left side for 15 mintues   3. Lie on your right side for 15 minutes   4. Get up but keep the medication  In your bladder for 60 more minutes- for a total of 2 hours   5. Empty your bladder following the directions below (if you have difficulty holding your bladder it is ok to empty your bladder early)  -Wear pad if incontinence is a possibliity  -Wash hands throughly after using the bathroom  -For safety reasons remember to follow these directions for 6 hours after each treatment:   1. Sit on the toilet seat to urinate   2. Immediately after urinating- add 2 cups of undiluted chlorine bleach to the toilet    3. Close lid and let the bleach sit for 15 minutes each time   4. Drink plenty of water to flush any remaining medication out of your bladder    **These steps will help kill all the BCG bacteria and disinfect the toilet**    Please void BCG at 11am    Take your antibiotic today at 5pm pm and tomorrow morning.    For any further questions please contact [facility] at [phone]    Please call urology triage line at [phone] or [phone] with fevers or chills, burning with urination, or blood in your urine which lasts more than a 48-72 hours or with any question or concerns.        March 2018 Sunday Monday Tuesday Wednesday Thursday Friday Saturday                       1     2     UMP ONC INFUSION 120   10:00 AM   (120  min.)   UC ONCOLOGY INFUSION   [facility] 3       4     5     6     7     8     9     UMP ONC INFUSION 120   10:00 AM   (120 min.)   UC ONCOLOGY INFUSION   [facility] 10       11     12     13     14     15     16     17       18     19     20     21     22     23     24       25     26     27     28     29     30     31 April 2018 Sunday Monday Tuesday Wednesday Thursday Friday Saturday   1     2     3     4     5     6     7       8     9     10     11     12     13     14       15     16     17     18     19     20     21       22     23     24     25     26     27     28       29     30                                                           Follow-ups after your visit        Your next 10 appointments already scheduled     Mar 09, 2018 10:00 AM CST   Infusion 120 with UC ONCOLOGY INFUSION, UC BED 2 ATC   [facility] ([facility]    [address]  [address]  [place] [address] [phone]            May 02, 2018  1:00 PM CDT   (Arrive by 12:45 PM)   Cystoscopy with Faustina Carey MD   [facility]rolog[facility] ([facility])    [address]  4th Floor  [place] [address] [phone]              Who to contact     If you have questions or need follow up information about today's clinic visit or your schedule please contact [facility] directly at [phone].  Normal or non-critical lab and imaging results will be communicated to you by MyChart, letter or phone within 4 business days after the clinic has received the results. If you do not hear from us within 7 days, please contact the clinic through MyChart or phone. If you have a critical or abnormal lab result, we will notify you by phone as soon as possible.  Submit refill requests through [employer] or call your pharmacy and they will forward  the refill request to us. Please allow 3 business days for your refill to be completed.          Additional Information About Your Visit        [employer] Information     [employer] gives you secure access to your electronic health record. If you see a primary care provider, you can also send messages to your care team and make appointments. If you have questions, please call your primary care clinic.  If you do not have a primary care provider, please call [phone] and they will assist you.        Care EveryWhere ID     This is your Care EveryWhere ID. This could be used by other organizations to access your [place] medical records  [id number]        Your Vitals Were     Pulse Temperature Respirations Pulse Oximetry          73 97.8  F (36.6  C) (Oral) 16 98%         Blood Pressure from Last 3 Encounters:   03/02/18 (!) 162/95   02/23/18 164/80   02/13/18 166/87    Weight from Last 3 Encounters:   01/03/18 74.4 kg (164 lb)   12/18/17 74.5 kg (164 lb 3.9 oz)   12/13/17 74.3 kg (163 lb 11.2 oz)              We Performed the Following     UA without Microscopic          Today's Medication Changes          These changes are accurate as of 3/2/18 11:11 AM.  If you have any questions, ask your nurse or doctor.               Stop taking these medicines if you haven't already. Please contact your care team if you have questions.     MAGNESIUM OXIDE PO                    Primary Care Provider Office Phone # Fax #    Bee Schaffer [phone]       [facility] [address] [facility] [address]        Equal Access to Services     TARAS BARNARD : Hadii maryam conti hadasho Soomaali, waaxda luqadaha, qaybta kaalmada adeegbebeda, saray berry . So [facility] [phone].    ATENCIÓN: Si habla español, tiene a combs disposición servicios gratuitos de asistencia lingüística. Llame al [phone].    We comply with applicable federal civil rights laws and [place] laws. We do not  discriminate on the basis of race, color, national origin, age, disability, sex, sexual orientation, or gender identity.            Thank you!     Thank you for choosing [facility]  for your care. Our goal is always to provide you with excellent care. Hearing back from our patients is one way we can continue to improve our services. Please take a few minutes to complete the written survey that you may receive in the mail after your visit with us. Thank you!             Your Updated Medication List - Protect others around you: Learn how to safely use, store and throw away your medicines at www.disposemymeds.org.          This list is accurate as of 3/2/18 11:11 AM.  Always use your most recent med list.                   Brand Name Dispense Instructions for use Diagnosis    glucosamine 500 MG Caps      Take 2 capsules by mouth every morning        levofloxacin 500 MG tablet    LEVAQUIN    12 tablet    Take 1 tablet 6 hours post treatment and 1 tablet each AM following each treatment.    Malignant neoplasm of overlapping sites of bladder (H)       Multiple vitamin Tabs      Take 1 tablet by mouth every morning        OMEPRAZOLE PO      Take 20 mg by mouth daily        sildenafil 100 MG tablet    VIAGRA     Take 0.5-1 Tabs by mouth. Take it one hour before sexual activity.

## 2018-03-09 ENCOUNTER — INFUSION THERAPY VISIT (OUTPATIENT)
Dept: ONCOLOGY | Facility: CLINIC | Age: 78
End: 2018-03-09
Attending: UROLOGY
Payer: COMMERCIAL

## 2018-03-09 VITALS
OXYGEN SATURATION: 96 % | DIASTOLIC BLOOD PRESSURE: 79 MMHG | SYSTOLIC BLOOD PRESSURE: 136 MMHG | TEMPERATURE: 97.7 F | HEART RATE: 68 BPM

## 2018-03-09 DIAGNOSIS — C67.8 MALIGNANT NEOPLASM OF OVERLAPPING SITES OF BLADDER (H): Primary | ICD-10-CM

## 2018-03-09 LAB
ALBUMIN UR-MCNC: 30 MG/DL
APPEARANCE UR: ABNORMAL
BILIRUB UR QL STRIP: NEGATIVE
COLOR UR AUTO: YELLOW
GLUCOSE UR STRIP-MCNC: NEGATIVE MG/DL
HGB UR QL STRIP: NEGATIVE
KETONES UR STRIP-MCNC: NEGATIVE MG/DL
LEUKOCYTE ESTERASE UR QL STRIP: ABNORMAL
NITRATE UR QL: NEGATIVE
PH UR STRIP: 5 PH (ref 5–7)
SOURCE: ABNORMAL
SP GR UR STRIP: 1.01 (ref 1–1.03)
UROBILINOGEN UR STRIP-MCNC: 0 MG/DL (ref 0–2)

## 2018-03-09 PROCEDURE — 25000125 ZZHC RX 250: Mod: ZF | Performed by: UROLOGY

## 2018-03-09 PROCEDURE — 51720 TREATMENT OF BLADDER LESION: CPT

## 2018-03-09 PROCEDURE — 88112 CYTOPATH CELL ENHANCE TECH: CPT | Performed by: UROLOGY

## 2018-03-09 PROCEDURE — 81003 URINALYSIS AUTO W/O SCOPE: CPT | Performed by: UROLOGY

## 2018-03-09 PROCEDURE — 88120 CYTP URNE 3-5 PROBES EA SPEC: CPT | Performed by: UROLOGY

## 2018-03-09 PROCEDURE — 25000128 H RX IP 250 OP 636: Mod: ZF | Performed by: UROLOGY

## 2018-03-09 RX ADMIN — BACILLUS CALMETTE-GUERIN 50 MG: 50 POWDER, FOR SUSPENSION INTRAVESICAL at 10:37

## 2018-03-09 RX ADMIN — LIDOCAINE HYDROCHLORIDE 10 ML: 20 JELLY TOPICAL at 10:26

## 2018-03-09 ASSESSMENT — PAIN SCALES - GENERAL: PAINLEVEL: NO PAIN (0)

## 2018-03-09 NOTE — MR AVS SNAPSHOT
After Visit Summary   3/9/2018    Max Zuñiga    MRN: [id number]           Patient Information     Date Of Birth          1940        Visit Information        Provider Department      3/9/2018 10:00 AM  BED 2 ATC;  ONCOLOGY INFUSION [facility]        Today's Diagnoses     Malignant neoplasm of overlapping sites of bladder (H)    -  1      Care Instructions    Home discharge instructions:  -To make sure each treatment has the best chance of working, follow these steps 2 hours after each treatment   1. Lie on your back for 15 minues   2. Lie on your left side for 15 mintues   3. Lie on your right side for 15 minutes   4. Get up but keep the medication  In your bladder for 60 more minutes- for a total of 2 hours   5. Empty your bladder following the directions below (if you have difficulty holding your bladder it is ok to empty your bladder early)  -Wear pad if incontinence is a possibliity  -Wash hands throughly after using the bathroom  -For safety reasons remember to follow these directions for 6 hours after each treatment:   1. Sit on the toilet seat to urinate   2. Immediately after urinating- add 2 cups of undiluted chlorine bleach to the toilet    3. Close lid and let the bleach sit for 15 minutes each time   4. Drink plenty of water to flush any remaining medication out of your bladder    **These steps will help kill all the BCG bacteria and disinfect the toilet**    Please void BCG at 1250    Take your antibiotic today at 1650 pm and tomorrow morning.      Please call urology triage line at [phone] or [phone] with fevers or chills, burning with urination, or blood in your urine which lasts more than a 48-72 hours or with any question or concerns.        March 2018 Sunday Monday Tuesday Wednesday Thursday Friday Saturday                       1     2     [facility] ONC INFUSION 120   10:00 AM   (120 min.)    ONCOLOGY INFUSION   [facility]  3       4     5     6     7     8     9     [facility] ONC INFUSION 120   10:00 AM   (120 min.)   [facility] ONCOLOGY INFUSION   [facility] 10       11     12     13     14     15     16     17       18     19     20     21     22     23     24       25     26     27     28     29     30     31 April 2018 Sunday Monday Tuesday Wednesday Thursday Friday Saturday   1     2     3     4     5     6     7       8     9     10     11     12     13     14       15     16     17     18     19     20     21       22     23     24     25     26     27     28       29     30                                                           Follow-ups after your visit        Your next 10 appointments already scheduled     May 02, 2018  1:00 PM CDT   (Arrive by 12:45 PM)   Cystoscopy with Faustina Carey MD   [facility] Urology and [facility] for Prostate and Urologic Cancers ([facility] and [facility])    [address]  [address] [facility] [address] [phone]              Who to contact     If you have questions or need follow up information about today's clinic visit or your schedule please contact [facility] directly at [phone].  Normal or non-critical lab and imaging results will be communicated to you by MyChart, letter or phone within 4 business days after the clinic has received the results. If you do not hear from us within 7 days, please contact the clinic through [employer] or phone. If you have a critical or abnormal lab result, we will notify you by phone as soon as possible.  Submit refill requests through [facility] or call your pharmacy and they will forward the refill request to us. Please allow 3 business days for your refill to be completed.          Additional Information About Your Visit        [employer] Information     [facility] gives you secure access to your electronic health record. If you see a primary care provider, you can also send messages to your  care team and make appointments. If you have questions, please call your primary care clinic.  If you do not have a primary care provider, please call [phone] and they will assist you.        Care EveryWhere ID     This is your Care EveryWhere ID. This could be used by other organizations to access your [facility] medical records  [id number]        Your Vitals Were     Pulse Temperature Pulse Oximetry             68 97.7  F (36.5  C) (Oral) 96%          Blood Pressure from Last 3 Encounters:   03/09/18 136/79   03/02/18 (!) 162/95   02/23/18 164/80    Weight from Last 3 Encounters:   01/03/18 74.4 kg (164 lb)   12/18/17 74.5 kg (164 lb 3.9 oz)   12/13/17 74.3 kg (163 lb 11.2 oz)              We Performed the Following     Cytology Non Gyn     FISH BLADDER CANCER     UA without Microscopic        Primary Care Provider Office Phone # Fax #    Bee Schaffer [phone]       [facility] [address] [facility] [address]        Equal Access to Services     [facility] EVON : Hadii aad ku hadasho Soomaali, waaxda luqadaha, qaybta kaalmada adeegyamarine, saray berry . So [facility] [phone].    ATENCIÓN: Si habla español, tiene a combs disposición servicios gratuitos de asistencia lingüística. Kamlesh al [phone].    We comply with applicable federal civil rights laws and [place] laws. We do not discriminate on the basis of race, color, national origin, age, disability, sex, sexual orientation, or gender identity.            Thank you!     Thank you for choosing [facility]  for your care. Our goal is always to provide you with excellent care. Hearing back from our patients is one way we can continue to improve our services. Please take a few minutes to complete the written survey that you may receive in the mail after your visit with us. Thank you!             Your Updated Medication List - Protect others around you: Learn how to safely use, store  and throw away your medicines at www.disposemymeds.org.          This list is accurate as of 3/9/18 10:52 AM.  Always use your most recent med list.                   Brand Name Dispense Instructions for use Diagnosis    glucosamine 500 MG Caps      Take 2 capsules by mouth every morning        levofloxacin 500 MG tablet    LEVAQUIN    12 tablet    Take 1 tablet 6 hours post treatment and 1 tablet each AM following each treatment.    Malignant neoplasm of overlapping sites of bladder (H)       Multiple vitamin Tabs      Take 1 tablet by mouth every morning        OMEPRAZOLE PO      Take 20 mg by mouth daily        sildenafil 100 MG tablet    VIAGRA     Take 0.5-1 Tabs by mouth. Take it one hour before sexual activity.

## 2018-03-09 NOTE — PATIENT INSTRUCTIONS
Home discharge instructions:  -To make sure each treatment has the best chance of working, follow these steps 2 hours after each treatment   1. Lie on your back for 15 minues   2. Lie on your left side for 15 mintues   3. Lie on your right side for 15 minutes   4. Get up but keep the medication  In your bladder for 60 more minutes- for a total of 2 hours   5. Empty your bladder following the directions below (if you have difficulty holding your bladder it is ok to empty your bladder early)  -Wear pad if incontinence is a possibliity  -Wash hands throughly after using the bathroom  -For safety reasons remember to follow these directions for 6 hours after each treatment:   1. Sit on the toilet seat to urinate   2. Immediately after urinating- add 2 cups of undiluted chlorine bleach to the toilet    3. Close lid and let the bleach sit for 15 minutes each time   4. Drink plenty of water to flush any remaining medication out of your bladder    **These steps will help kill all the BCG bacteria and disinfect the toilet**    Please void BCG at 1250    Take your antibiotic today at 1650 pm and tomorrow morning.      Please call urology triage line at [phone] or [phone] with fevers or chills, burning with urination, or blood in your urine which lasts more than a 48-72 hours or with any question or concerns.        March 2018 Sunday Monday Tuesday Wednesday Thursday Friday Saturday                       1     2     [facility] ONC INFUSION 120   10:00 AM   (120 min.)    ONCOLOGY INFUSION   [facility] 3       4     5     6     7     8     9     [facility] ONC INFUSION 120   10:00 AM   (120 min.)    ONCOLOGY INFUSION   [facility] 10       11     12     13     14     15     16     17       18     19     20     21     22     23     24       25     26     27     28     29     30     31                April 2018 Sunday Monday Tuesday Wednesday Thursday Friday Saturday   1     2     3      4     5     6     7       8     9     10     11     12     13     14       15     16     17     18     19     20     21       22     23     24     25     26     27     28       29     30

## 2018-03-09 NOTE — PROGRESS NOTES
Infusion Nursing Note:  Max Zuñiga presents today for BCG Bladder Instillation dose 6 of 6.    Patient seen by provider today: No    Note: Was able to hold his bladder for 2 hours post last treatment.  Denies fevers/chills.  Did have some burning the day of his last treatment, none further.  Also reports having diarrhea this past Wednesday and thinks he may have caught something from his grand-kids.  Has 2 doses of Levaquin remaining for today's treatment.     No visible blood seen on today's urine sent down to lab.      Treatment Conditions:  Urine UA RESULTS:  Recent Labs   Lab Test  03/09/18   0945   12/13/17   0952   COLOR  Yellow   < >  Yellow   APPEARANCE  Slightly Cloudy   < >  Clear   URINEGLC  Negative   < >  Negative   URINEBILI  Negative   < >  Negative   URINEKETONE  Negative   < >  Negative   SG  1.011   < >  1.012   UBLD  Negative   < >  Negative   URINEPH  5.0   < >  6.0   PROTEIN  30*   < >  30*   NITRITE  Negative   < >  Negative   LEUKEST  Trace*   < >  Trace*   RBCU   --    --   2   WBCU   --    --   12*    < > = values in this interval not displayed.     .    Pre-procedure Assessment:  Dysuria: No  Hematuria: No  Fever/chills: No  Increased urinary urgency: No  Increased urinary frequency: No    Lidocaine urojet 2% 10 ml used: YES  Catheter placed using sterile technique: 14 Amharic red blackburn    Post-procedure Assessment:  Patient tolerated bladder instillation without incident.    Patient instructed on the following and verbalized understanding:   Medication to remain in the bladder for 2 hours post instillation: YES  Post instillation side effects and precautions discussed: YES    Discharge Plan:   Patient declined prescription refills.  Copy of AVS via My Chart per patient request.  Patient will return 5/2/18 for cystoscopy with Dr. Carey for next appointment.  Patient discharged in stable condition accompanied by: self.  Departure Mode: Ambulatory.  Face to Face time: 0.    Tamera RUIZ  PABLO Chowdhury

## 2018-03-23 LAB — COPATH REPORT: NORMAL

## 2018-04-12 ENCOUNTER — TELEPHONE (OUTPATIENT)
Dept: UROLOGY | Facility: CLINIC | Age: 78
End: 2018-04-12

## 2018-04-27 ENCOUNTER — PRE VISIT (OUTPATIENT)
Dept: UROLOGY | Facility: CLINIC | Age: 78
End: 2018-04-27

## 2018-04-27 NOTE — TELEPHONE ENCOUNTER
Reason for visit: cytsoscopy     Relevant information: bladder cancer    Records/imaging/labs: all records available    Pt called: No need for a call    Rooming: collect a cytology

## 2018-05-04 ENCOUNTER — OFFICE VISIT (OUTPATIENT)
Dept: UROLOGY | Facility: CLINIC | Age: 78
End: 2018-05-04
Payer: COMMERCIAL

## 2018-05-04 VITALS
SYSTOLIC BLOOD PRESSURE: 139 MMHG | HEART RATE: 90 BPM | DIASTOLIC BLOOD PRESSURE: 81 MMHG | OXYGEN SATURATION: 97 % | BODY MASS INDEX: 24.37 KG/M2 | HEIGHT: 69 IN | WEIGHT: 164.5 LBS

## 2018-05-04 DIAGNOSIS — C67.8 MALIGNANT NEOPLASM OF OVERLAPPING SITES OF BLADDER (H): Primary | ICD-10-CM

## 2018-05-04 RX ORDER — INDOMETHACIN 25 MG/1
25 CAPSULE ORAL
COMMUNITY
Start: 2018-04-16

## 2018-05-04 RX ORDER — LEVOFLOXACIN 500 MG/1
TABLET, FILM COATED ORAL
Qty: 6 TABLET | Refills: 6 | Status: SHIPPED | OUTPATIENT
Start: 2018-05-04 | End: 2018-11-26

## 2018-05-04 RX ORDER — COLCHICINE 0.6 MG/1
TABLET ORAL
COMMUNITY
Start: 2018-04-16

## 2018-05-04 ASSESSMENT — PAIN SCALES - GENERAL: PAINLEVEL: NO PAIN (0)

## 2018-05-04 NOTE — LETTER
5/4/2018       RE: Max Zuñiga  [address]  [facility] [address]     Dear Colleague,    Thank you for referring your patient, Max Zuñiga, to the [facility] UROLOGY AND [facility] FOR PROSTATE AND UROLOGIC CANCERS at [facility]. Please see a copy of my visit note below.    Pre-procedure diagnosis: Bladder cancer HG T1 post induction BCG  Post procedure diagnosis: normal cystoscopy  Procedure performed: cystoscopy  Surgeon: EVE Carey MD  Anesthesia: local    Indications for procedure: Patient is a 77 year old male with a history of bladder cancer  Here today for surveillance cysto    Description of procedure: After fully informed voluntary consent was obtained patient was brought into the procedure room, identified and placed in a supine position on the cysto table.  The groin/scrotum were prepped and draped in a sterile fashion with betadine.  A 15F flexible cystoscope was inserted into the urethra and the bladder and urethra examined in a systematic manner.  There were no tumor stones or diverticula.  Ureteric orifices were normal in position and number and effluxing clear urine.  The prostate was 3 cm long and showed bilobar hypertrophy.  There was a median lobe.  Distal urethra was normal.  The patient tolerated the procedure well and there were no complications.      Assessment/Plan: Patient with a history of bladder cancer with negative cystoscopy.  Follow up in 3 months for surveillance.  We will obtain a urine cytology today. Plan for 3 doses now of BCG            Again, thank you for allowing me to participate in the care of your patient.      Sincerely,    Faustina Carey MD

## 2018-05-04 NOTE — PROGRESS NOTES
Pre-procedure diagnosis: Bladder cancer HG T1 post induction BCG  Post procedure diagnosis: normal cystoscopy  Procedure performed: cystoscopy  Surgeon: EVE Carey MD  Anesthesia: local    Indications for procedure: Patient is a 77 year old male with a history of bladder cancer  Here today for surveillance cysto    Description of procedure: After fully informed voluntary consent was obtained patient was brought into the procedure room, identified and placed in a supine position on the cysto table.  The groin/scrotum were prepped and draped in a sterile fashion with betadine.  A 15F flexible cystoscope was inserted into the urethra and the bladder and urethra examined in a systematic manner.  There were no tumor stones or diverticula.  Ureteric orifices were normal in position and number and effluxing clear urine.  The prostate was 3 cm long and showed bilobar hypertrophy.  There was a median lobe.  Distal urethra was normal.  The patient tolerated the procedure well and there were no complications.      Assessment/Plan: Patient with a history of bladder cancer with negative cystoscopy.  Follow up in 3 months for surveillance.  We will obtain a urine cytology today. Plan for 3 doses now of BCG

## 2018-05-04 NOTE — MR AVS SNAPSHOT
"              After Visit Summary   5/4/2018    Max Zuñiga    MRN: [id number]           Patient Information     Date Of Birth          1940        Visit Information        Provider Department      5/4/2018 1:40 PM Faustina Carey MD [facility] Urology and [facility] for Prostate and Urologic Cancers        Today's Diagnoses     Malignant neoplasm of overlapping sites of bladder (H)    -  1      Care Instructions    Patient scheduled BCG maintenance.      Vikram Draper MA    It was a pleasure meeting with you today.  Thank you for allowing me and my team the privilege of caring for you today.  YOU are the reason we are here, and I truly hope we provided you with the excellent service you deserve.  Please let us know if there is anything else we can do for you so that we can be sure you are leaving completely satisfied with your care experience.        AFTER YOUR CYSTOSCOPY        You have just completed a cystoscopy, or \"cysto\", which allowed your physician to learn more about your bladder (or to remove a stent placed after surgery). We suggest that you continue to avoid caffeine, fruit juice, and alcohol for the next 24 hours, however, you are encouraged to return to your normal activities.         A few things that are considered normal after your cystoscopy:     * Small amount of bleeding (or spotting) that clears within the next 24 hours     * Slight burning sensation with urination     * Sensation to of needing to avoid more frequently     * The feeling of \"air\" in your urine     * Mild discomfort that is relieved with Tylenol        Please contact our office promptly if you:     * Develop a fever above 101 degrees     * Are unable to urinate     * Develop bright red blood that does not stop     * Severe pain or swelling         Please contact our office with any concerns or questions @[facility].          Follow-ups after your visit        Follow-up notes from your care team     Return in 12 months " "(on 5/4/2019).      Who to contact     Please call your clinic at [phone] to:    Ask questions about your health    Make or cancel appointments    Discuss your medicines    Learn about your test results    Speak to your doctor            Additional Information About Your Visit        [employer] Information     [facility] gives you secure access to your electronic health record. If you see a primary care provider, you can also send messages to your care team and make appointments. If you have questions, please call your primary care clinic.  If you do not have a primary care provider, please call [phone] and they will assist you.      [facility] is an electronic gateway that provides easy, online access to your medical records. With [facility], you can request a clinic appointment, read your test results, renew a prescription or communicate with your care team.     To access your existing account, please contact your [facility] or call [phone] for assistance.        Care EveryWhere ID     This is your Care EveryWhere ID. This could be used by other organizations to access your [facility] medical records  [id number]        Your Vitals Were     Pulse Height Pulse Oximetry BMI (Body Mass Index)          90 1.753 m (5' 9\") 97% 24.29 kg/m2         Blood Pressure from Last 3 Encounters:   05/04/18 139/81   03/09/18 136/79   03/02/18 (!) 162/95    Weight from Last 3 Encounters:   05/04/18 74.6 kg (164 lb 8 oz)   01/03/18 74.4 kg (164 lb)   12/18/17 74.5 kg (164 lb 3.9 oz)              We Performed the Following     CYSTOURETHROSCOPY     Cytology non gyn        Primary Care Provider Office Phone # Fax #    Bee Sarbjit [phone]       [facility] [address] [facility] [address]        Equal Access to Services     CARMINA BARNARD : Zechariah Dawson, farrah marquis, bill kaalsaray parikh. So wa " [phone].    ATENCIÓN: Si ya carnes, tiene a combs disposición servicios gratuitos de asistencia lingüística. Kamlesh chang [phone].    We comply with applicable federal civil rights laws and [place] laws. We do not discriminate on the basis of race, color, national origin, age, disability, sex, sexual orientation, or gender identity.            Thank you!     Thank you for choosing [facility] UROLOGY AND [facility] FOR PROSTATE AND UROLOGIC CANCERS  for your care. Our goal is always to provide you with excellent care. Hearing back from our patients is one way we can continue to improve our services. Please take a few minutes to complete the written survey that you may receive in the mail after your visit with us. Thank you!             Your Updated Medication List - Protect others around you: Learn how to safely use, store and throw away your medicines at www.disposemymeds.org.          This list is accurate as of 5/4/18  2:22 PM.  Always use your most recent med list.                   Brand Name Dispense Instructions for use Diagnosis    colchicine 0.6 MG tablet      1.2 mg at the first sign of flare, followed in 1 hour with a single dose of 0.6 mg (maximum: 1.8 mg within 1 hour)        glucosamine 500 MG Caps      Take 2 capsules by mouth every morning        indomethacin 25 MG capsule    INDOCIN     Take 25 mg by mouth        levofloxacin 500 MG tablet    LEVAQUIN    12 tablet    Take 1 tablet 6 hours post treatment and 1 tablet each AM following each treatment.    Malignant neoplasm of overlapping sites of bladder (H)       Multiple vitamin Tabs      Take 1 tablet by mouth every morning        OMEPRAZOLE PO      Take 20 mg by mouth daily        sildenafil 100 MG tablet    VIAGRA     Take 0.5-1 Tabs by mouth. Take it one hour before sexual activity.

## 2018-05-04 NOTE — NURSING NOTE
Chief Complaint   Patient presents with     Cystoscopy     bladder cancer       Vikram Draper MA  Invasive Procedure Safety Checklist:    Procedure: Cystoscopy    Action: Complete sections and checkboxes as appropriate.    Pre-procedure:  1. Patient ID Verified with 2 identifiers (Arely and  or MRN) : YES    2. Procedure and site verified with patient/designee (when able) : YES    3. Accurate consent documentation in medical record : YES    4. H&P (or appropriate assessment) documented in medical record : NO  H&P must be up to 30 days prior to procedure an updated within 24 hours of                 Procedure as applicable.     5. Relevant diagnostic and radiology test results appropriately labeled and displayed as applicable : NO    6. Blood products, implants, devices, and/or special equipment available for the procedure as applicable : NO    7. Procedure site(s) marked with provider initials [Exclusions: yes] : YES    8. Marking not required. Reason : Yes  Procedure does not require site marking    Time Out:     Time-Out performed immediately prior to starting procedure, including verbal and active participation of all team members addressing: YES    1. Correct patient identity.  2. Confirmed that the correct side and site are marked.  3. An accurate procedure to be done.  4. Agreement on the procedure to be done.  5. Correct patient position.  6. Relevant images and results are properly labeled and appropriately displayed.  7. The need to administer antibiotics or fluids for irrigation purposes during the procedure as applicable.  8. Safety precautions based on patient history or medication use.    During Procedure: Verification of correct person, site, and procedure occurs any time the responsibility for care of the patient is transferred to another member of the care team.

## 2018-05-04 NOTE — PATIENT INSTRUCTIONS
"Patient scheduled BCG maintenance.      Vikram Draper MA    It was a pleasure meeting with you today.  Thank you for allowing me and my team the privilege of caring for you today.  YOU are the reason we are here, and I truly hope we provided you with the excellent service you deserve.  Please let us know if there is anything else we can do for you so that we can be sure you are leaving completely satisfied with your care experience.        AFTER YOUR CYSTOSCOPY        You have just completed a cystoscopy, or \"cysto\", which allowed your physician to learn more about your bladder (or to remove a stent placed after surgery). We suggest that you continue to avoid caffeine, fruit juice, and alcohol for the next 24 hours, however, you are encouraged to return to your normal activities.         A few things that are considered normal after your cystoscopy:     * Small amount of bleeding (or spotting) that clears within the next 24 hours     * Slight burning sensation with urination     * Sensation to of needing to avoid more frequently     * The feeling of \"air\" in your urine     * Mild discomfort that is relieved with Tylenol        Please contact our office promptly if you:     * Develop a fever above 101 degrees     * Are unable to urinate     * Develop bright red blood that does not stop     * Severe pain or swelling         Please contact our office with any concerns or questions @[facility].  "

## 2018-05-11 ENCOUNTER — INFUSION THERAPY VISIT (OUTPATIENT)
Dept: ONCOLOGY | Facility: CLINIC | Age: 78
End: 2018-05-11
Attending: UROLOGY
Payer: COMMERCIAL

## 2018-05-11 VITALS
TEMPERATURE: 98 F | OXYGEN SATURATION: 97 % | DIASTOLIC BLOOD PRESSURE: 88 MMHG | RESPIRATION RATE: 16 BRPM | SYSTOLIC BLOOD PRESSURE: 153 MMHG | HEART RATE: 65 BPM

## 2018-05-11 DIAGNOSIS — C67.8 MALIGNANT NEOPLASM OF OVERLAPPING SITES OF BLADDER (H): Primary | ICD-10-CM

## 2018-05-11 LAB
ALBUMIN UR-MCNC: 30 MG/DL
APPEARANCE UR: ABNORMAL
BILIRUB UR QL STRIP: NEGATIVE
COLOR UR AUTO: YELLOW
GLUCOSE UR STRIP-MCNC: NEGATIVE MG/DL
HGB UR QL STRIP: NEGATIVE
KETONES UR STRIP-MCNC: NEGATIVE MG/DL
LEUKOCYTE ESTERASE UR QL STRIP: ABNORMAL
NITRATE UR QL: NEGATIVE
PH UR STRIP: 7 PH (ref 5–7)
SOURCE: ABNORMAL
SP GR UR STRIP: 1.01 (ref 1–1.03)
UROBILINOGEN UR STRIP-MCNC: 0 MG/DL (ref 0–2)

## 2018-05-11 PROCEDURE — 25000125 ZZHC RX 250: Mod: ZF | Performed by: UROLOGY

## 2018-05-11 PROCEDURE — 81003 URINALYSIS AUTO W/O SCOPE: CPT | Performed by: UROLOGY

## 2018-05-11 PROCEDURE — 51720 TREATMENT OF BLADDER LESION: CPT

## 2018-05-11 PROCEDURE — 25000128 H RX IP 250 OP 636: Mod: ZF | Performed by: UROLOGY

## 2018-05-11 RX ADMIN — LIDOCAINE HYDROCHLORIDE 10 ML: 20 JELLY TOPICAL at 13:02

## 2018-05-11 RX ADMIN — BACILLUS CALMETTE-GUERIN 50 MG: 50 POWDER, FOR SUSPENSION INTRAVESICAL at 13:03

## 2018-05-11 ASSESSMENT — PAIN SCALES - GENERAL: PAINLEVEL: NO PAIN (0)

## 2018-05-11 NOTE — PATIENT INSTRUCTIONS
Home discharge instructions:  -To make sure each treatment has the best chance of working, follow these steps 2 hours after each treatment   1. Lie on your back for 15 minues   2. Lie on your left side for 15 mintues   3. Lie on your right side for 15 minutes   4. Get up but keep the medication  In your bladder for 60 more minutes- for a total of 2 hours   5. Empty your bladder following the directions below (if you have difficulty holding your bladder it is ok to empty your bladder early)  -Wear pad if incontinence is a possibliity  -Wash hands throughly after using the bathroom  -For safety reasons remember to follow these directions for 6 hours after each treatment:   1. Sit on the toilet seat to urinate   2. Immediately after urinating- add 2 cups of undiluted chlorine bleach to the toilet    3. Close lid and let the bleach sit for 15 minutes each time   4. Drink plenty of water to flush any remaining medication out of your bladder    **These steps will help kill all the BCG bacteria and disinfect the toilet**    Please void BCG at 3:15pm    Take your antibiotic today at 7:15pm and tomorrow morning.      Please call urology triage line at [phone] or [phone] with fevers or chills, burning with urination, or blood in your urine which lasts more than a 48-72 hours or with any question or concerns.        May 2018   Jay Monday Tuesday Wednesday Thursday Friday Saturday             1     2     3     4     UMP CYSTOSCOPY    1:25 PM   (20 min.)   Faustina Carey MD   [facility] Urology and Inst for Prostate and Urologic Cancers 5       6     7     8     9     10     11     UMP ONC INFUSION 120   12:00 PM   (120 min.)    ONCOLOGY INFUSION   [facility] 12       13     14     15     16     17     18     UMP ONC INFUSION 120   12:00 PM   (120 min.)    ONCOLOGY INFUSION   [facility] 19       20     21     22     23     24     25     UMP ONC INFUSION 120   12:00  PM   (120 min.)    ONCOLOGY [facility] [place] [facility] 26       27     28     29     30     31 June 2018 Sunday Monday Tuesday Wednesday Thursday Friday Saturday                            1     2       3     4     5     6     7     8     9       10     11     12     13     14     15     16       17     18     19     20     21     22     23       24     25     26     27     28     29     30

## 2018-05-11 NOTE — PROGRESS NOTES
Infusion Nursing Note:  Max Zuñiga presents today for BCG bladder instillation maintenance dose 1 of 3. .    Patient seen by provider today: No    Note: pt states holding BCG for 2 hours post last treatment without difficulty. Pt denies any unusual urinary symptoms since last visit and denies fever, chills, or dysuria today. No visible blood seen in today's sample.       Treatment Conditions:  UA RESULTS:  Recent Labs   Lab Test  05/11/18   1200   12/13/17   0952   COLOR  Yellow   < >  Yellow   APPEARANCE  Slightly Cloudy   < >  Clear   URINEGLC  Negative   < >  Negative   URINEBILI  Negative   < >  Negative   URINEKETONE  Negative   < >  Negative   SG  1.011   < >  1.012   UBLD  Negative   < >  Negative   URINEPH  7.0   < >  6.0   PROTEIN  30*   < >  30*   NITRITE  Negative   < >  Negative   LEUKEST  Large*   < >  Trace*   RBCU   --    --   2   WBCU   --    --   12*    < > = values in this interval not displayed.       Pre-procedure Assessment:  Dysuria:  No  Hematuria:  No  Fever/chills: No  Increased urinary urgency: No  Increased urinary frequency: No    Lidocaine urojet 2% 10 ml used: YES  Catheter placed using sterile technique: 14 Yi Red Prajapati    Post-procedure Assessment:  Pt tolerated bladder instillation without issues.    Patient instructed on the following and verbalized understanding:   Medication to remain in the bladder for 2 hours post instillation: YES  Post instillation side effects and precautions discussed: YES    Discharge Plan:   Patient declined prescription refills.  Copy of AVS reviewed with patient and/or family.  Patient will return 5/18 for next appointment.  Patient discharged in stable condition accompanied by: self.  Departure Mode: Ambulatory.    PRATEEK ALTAMIRANO RN

## 2018-05-11 NOTE — MR AVS SNAPSHOT
After Visit Summary   5/11/2018    Max Zuñiga    MRN: [id number]           Patient Information     Date Of Birth          1940        Visit Information        Provider Department      5/11/2018 12:00 PM  BED 2 ATC; UC 11 ATC;  ONCOLOGY INFUSION [facility]        Today's Diagnoses     Malignant neoplasm of overlapping sites of bladder (H)    -  1      Care Instructions    Home discharge instructions:  -To make sure each treatment has the best chance of working, follow these steps 2 hours after each treatment   1. Lie on your back for 15 minues   2. Lie on your left side for 15 mintues   3. Lie on your right side for 15 minutes   4. Get up but keep the medication  In your bladder for 60 more minutes- for a total of 2 hours   5. Empty your bladder following the directions below (if you have difficulty holding your bladder it is ok to empty your bladder early)  -Wear pad if incontinence is a possibliity  -Wash hands throughly after using the bathroom  -For safety reasons remember to follow these directions for 6 hours after each treatment:   1. Sit on the toilet seat to urinate   2. Immediately after urinating- add 2 cups of undiluted chlorine bleach to the toilet    3. Close lid and let the bleach sit for 15 minutes each time   4. Drink plenty of water to flush any remaining medication out of your bladder    **These steps will help kill all the BCG bacteria and disinfect the toilet**    Please void BCG at 3:15pm    Take your antibiotic today at 7:15pm and tomorrow morning.      Please call urology triage line at [phone] or [phone] with fevers or chills, burning with urination, or blood in your urine which lasts more than a 48-72 hours or with any question or concerns.        May 2018   Jay Monday Tuesday Wednesday Thursday Friday Saturday             1     2     3     4     UMP CYSTOSCOPY    1:25 PM   (20 min.)   Faustina Carey MD   [facility] Urology and  Inst for Prostate and Urologic Cancers 5       6     7     8     9     10     11     UMP ONC INFUSION 120   12:00 PM   (120 min.)   UC ONCOLOGY INFUSION   [facility] 12       13     14     15     16     17     18     UMP ONC INFUSION 120   12:00 PM   (120 min.)   UC ONCOLOGY INFUSION   [facility] 19       20     21     22     23     24     25     UMP ONC INFUSION 120   12:00 PM   (120 min.)   UC ONCOLOGY INFUSION   [facility] 26       27     28     29     30     31 June 2018 Sunday Monday Tuesday Wednesday Thursday Friday Saturday                            1     2       3     4     5     6     7     8     9       10     11     12     13     14     15     16       17     18     19     20     21     22     23       24     25     26     27     28     29     30                                  Follow-ups after your visit        Your next 10 appointments already scheduled     May 18, 2018 12:00 PM CDT   Infusion 120 with UC ONCOLOGY INFUSION, UC 24 ATC, UC BED 2 ATC   [facility] ([facility])    [address]  [address]  [place] [address] [phone]            May 25, 2018 12:00 PM CDT   Infusion 120 with UC ONCOLOGY INFUSION, UC 11 ATC, UC BED 2 ATC   [facility] ([facility])    [address]  [address]  [place] [address] [phone]              Who to contact     If you have questions or need follow up information about today's clinic visit or your schedule please contact [facility] directly at [phone].  Normal or non-critical lab and imaging results will be communicated to you by MyChart, letter or phone within 4 business days after the clinic has received the results. If you do not hear from us within 7 days, please contact the clinic through MyChart or phone. If you have a critical  or abnormal lab result, we will notify you by phone as soon as possible.  Submit refill requests through [employer] or call your pharmacy and they will forward the refill request to us. Please allow 3 business days for your refill to be completed.          Additional Information About Your Visit        [email or web address] Information     [employer] gives you secure access to your electronic health record. If you see a primary care provider, you can also send messages to your care team and make appointments. If you have questions, please call your primary care clinic.  If you do not have a primary care provider, please call [phone] and they will assist you.        Care EveryWhere ID     This is your Care EveryWhere ID. This could be used by other organizations to access your [place] medical records  [id number]        Your Vitals Were     Pulse Temperature Respirations Pulse Oximetry          65 98  F (36.7  C) (Oral) 16 97%         Blood Pressure from Last 3 Encounters:   05/11/18 153/88   05/04/18 139/81   03/09/18 136/79    Weight from Last 3 Encounters:   05/04/18 74.6 kg (164 lb 8 oz)   01/03/18 74.4 kg (164 lb)   12/18/17 74.5 kg (164 lb 3.9 oz)              We Performed the Following     UA without Microscopic        Primary Care Provider Office Phone # Fax #    Bee Schaffer [phone]       [facility] [address] [facility] [address]        Equal Access to Services     CARMINA BARNARD : Hadii aad ku hadasho Soomaali, waaxda luqadaha, qaybta kaalmada adeegyada, saray berry . So [facility] [phone].    ATENCIÓN: Si habla español, tiene a combs disposición servicios gratuitos de asistencia lingüística. Kamlesh al [phone].    We comply with applicable federal civil rights laws and [place] laws. We do not discriminate on the basis of race, color, national origin, age, disability, sex, sexual orientation, or gender identity.            Thank you!     Thank you for  [facility]  for your care. Our goal is always to provide you with excellent care. Hearing back from our patients is one way we can continue to improve our services. Please take a few minutes to complete the written survey that you may receive in the mail after your visit with us. Thank you!             Your Updated Medication List - Protect others around you: Learn how to safely use, store and throw away your medicines at www.disposemymeds.org.          This list is accurate as of 5/11/18  2:45 PM.  Always use your most recent med list.                   Brand Name Dispense Instructions for use Diagnosis    colchicine 0.6 MG tablet    COLCYRS     1.2 mg at the first sign of flare, followed in 1 hour with a single dose of 0.6 mg (maximum: 1.8 mg within 1 hour)        glucosamine 500 MG Caps      Take 2 capsules by mouth every morning        indomethacin 25 MG capsule    INDOCIN     Take 25 mg by mouth        levofloxacin 500 MG tablet    LEVAQUIN    6 tablet    Take 1 tablet 6 hours post treatment and 1 tablet each AM following each treatment.    Malignant neoplasm of overlapping sites of bladder (H)       Multiple vitamin Tabs      Take 1 tablet by mouth every morning        OMEPRAZOLE PO      Take 20 mg by mouth daily        sildenafil 100 MG tablet    VIAGRA     Take 0.5-1 Tabs by mouth. Take it one hour before sexual activity.

## 2018-05-14 LAB — COPATH REPORT: NORMAL

## 2018-05-18 ENCOUNTER — INFUSION THERAPY VISIT (OUTPATIENT)
Dept: ONCOLOGY | Facility: CLINIC | Age: 78
End: 2018-05-18
Attending: UROLOGY
Payer: COMMERCIAL

## 2018-05-18 VITALS
HEART RATE: 64 BPM | DIASTOLIC BLOOD PRESSURE: 83 MMHG | RESPIRATION RATE: 18 BRPM | SYSTOLIC BLOOD PRESSURE: 148 MMHG | OXYGEN SATURATION: 98 %

## 2018-05-18 DIAGNOSIS — C67.8 MALIGNANT NEOPLASM OF OVERLAPPING SITES OF BLADDER (H): Primary | ICD-10-CM

## 2018-05-18 LAB
ALBUMIN UR-MCNC: 30 MG/DL
APPEARANCE UR: ABNORMAL
BILIRUB UR QL STRIP: NEGATIVE
COLOR UR AUTO: YELLOW
GLUCOSE UR STRIP-MCNC: NEGATIVE MG/DL
HGB UR QL STRIP: NEGATIVE
KETONES UR STRIP-MCNC: NEGATIVE MG/DL
LEUKOCYTE ESTERASE UR QL STRIP: ABNORMAL
NITRATE UR QL: NEGATIVE
PH UR STRIP: 6 PH (ref 5–7)
SOURCE: ABNORMAL
SP GR UR STRIP: 1.01 (ref 1–1.03)
UROBILINOGEN UR STRIP-MCNC: 0 MG/DL (ref 0–2)

## 2018-05-18 PROCEDURE — 81003 URINALYSIS AUTO W/O SCOPE: CPT | Performed by: UROLOGY

## 2018-05-18 PROCEDURE — 51720 TREATMENT OF BLADDER LESION: CPT

## 2018-05-18 PROCEDURE — 25000128 H RX IP 250 OP 636: Mod: ZF | Performed by: UROLOGY

## 2018-05-18 PROCEDURE — 25000125 ZZHC RX 250: Mod: ZF | Performed by: UROLOGY

## 2018-05-18 RX ADMIN — BACILLUS CALMETTE-GUERIN 50 MG: 50 POWDER, FOR SUSPENSION INTRAVESICAL at 12:50

## 2018-05-18 RX ADMIN — LIDOCAINE HYDROCHLORIDE 10 ML: 20 JELLY TOPICAL at 12:35

## 2018-05-18 ASSESSMENT — PAIN SCALES - GENERAL: PAINLEVEL: NO PAIN (0)

## 2018-05-18 NOTE — PROGRESS NOTES
Infusion Nursing Note:  Max Zuñiga presents today for BCG bladder instillation maintenance 2/3.    Patient seen by provider today: No    Note: Patient arrived to infusion center and reported able to hold BCG in bladder for full two hours last treatment. Reports taking both doses of levaquin after last treatment. Reported some irritation/pain with urination that resolved after a day and a half after last treatment. Patient denied complaints of fever, chills, flu-like symptoms, gross hematuria, and dysuria.    Treatment Conditions:  UA RESULTS:  Recent Labs   Lab Test  05/18/18   1156   12/13/17   0952   COLOR  Yellow   < >  Yellow   APPEARANCE  Slightly Cloudy   < >  Clear   URINEGLC  Negative   < >  Negative   URINEBILI  Negative   < >  Negative   URINEKETONE  Negative   < >  Negative   SG  1.012   < >  1.012   UBLD  Negative   < >  Negative   URINEPH  6.0   < >  6.0   PROTEIN  30*   < >  30*   NITRITE  Negative   < >  Negative   LEUKEST  Trace*   < >  Trace*   RBCU   --    --   2   WBCU   --    --   12*    < > = values in this interval not displayed.     Pre-procedure Assessment:  Dysuria: No  Hematuria: No  Fever/chills: No  Increased urinary urgency: No  Increased urinary frequency: No    Lidocaine urojet 2% 10 ml used: YES  Catheter placed using sterile technique: 14 Serbian red blackburn straight catheter    Post-procedure Assessment:  Patient tolerated BCG bladder instillation without incident.    Patient instructed on the following and verbalized understanding:   Medication to remain in the bladder for 2 hours post instillation: YES  Post instillation side effects and precautions discussed: YES    Discharge Plan:   Patient declined prescription refills.  Discharge instructions reviewed with: Patient.  Patient and/or family verbalized understanding of discharge instructions and all questions answered.  Copy of AVS reviewed with patient and/or family.  Patient will return 5/25/2018 for next  appointment.  Patient discharged in stable condition accompanied by: self.  Departure Mode: Ambulatory.    Adryan Nunez RN

## 2018-05-18 NOTE — MR AVS SNAPSHOT
After Visit Summary   5/18/2018    Max Zuñiga    MRN: [id number]           Patient Information     Date Of Birth          1940        Visit Information        Provider Department      5/18/2018 12:00 PM  BED 2 ATC; UC 24 ATC;  ONCOLOGY INFUSION [facility]        Today's Diagnoses     Malignant neoplasm of overlapping sites of bladder (H)    -  1      Care Instructions    Home discharge instructions:  -To make sure each treatment has the best chance of working, follow these steps 2 hours after each treatment   1. Lie on your back for 15 minues   2. Lie on your left side for 15 mintues   3. Lie on your right side for 15 minutes   4. Get up but keep the medication  In your bladder for 60 more minutes- for a total of 2 hours   5. Empty your bladder following the directions below (if you have difficulty holding your bladder it is ok to empty your bladder early)  -Wear pad if incontinence is a possibliity  -Wash hands throughly after using the bathroom  -For safety reasons remember to follow these directions for 6 hours after each treatment:   1. Sit on the toilet seat to urinate   2. Immediately after urinating- add 2 cups of undiluted chlorine bleach to the toilet    3. Close lid and let the bleach sit for 15 minutes each time   4. Drink plenty of water to flush any remaining medication out of your bladder    **These steps will help kill all the BCG bacteria and disinfect the toilet**    Please void BCG at 3:05    Take your antibiotic today at 7:05 pm and tomorrow morning.      Please call urology triage line at [phone] or [phone] with fevers or chills, burning with urination, or blood in your urine which lasts more than a 48-72 hours or with any question or concerns.        May 2018   Jay Monday Tuesday Wednesday Thursday Friday Saturday             1     2     3     4     UMP CYSTOSCOPY    1:25 PM   (20 min.)   Faustina Carey MD   [facility] Urology and  Inst for Prostate and Urologic Cancers 5       6     7     8     9     10     11     UMP ONC INFUSION 120   12:00 PM   (120 min.)   UC ONCOLOGY INFUSION   [facility] 12       13     14     15     16     17     18     UMP ONC INFUSION 120   12:00 PM   (120 min.)   UC ONCOLOGY INFUSION   [facility] 19       20     21     22     23     24     25     UMP ONC INFUSION 120   12:00 PM   (120 min.)   UC ONCOLOGY INFUSION   [facility] 26       27     28     29     30     31 June 2018 Sunday Monday Tuesday Wednesday Thursday Friday Saturday                            1     2       3     4     5     6     7     8     9       10     11     12     13     14     15     16       17     18     19     20     21     22     23       24     25     26     27     28     29     30                    Follow-ups after your visit        Your next 10 appointments already scheduled     May 25, 2018 12:00 PM CDT   Infusion 120 with UC ONCOLOGY INFUSION, UC 11 ATC, UC BED 2 ATC   [facility] ([facility])    [address] [address] [phone]              Who to contact     If you have questions or need follow up information about today's clinic visit or your schedule please contact [facility] directly at [phone].  Normal or non-critical lab and imaging results will be communicated to you by MyChart, letter or phone within 4 business days after the clinic has received the results. If you do not hear from us within 7 days, please contact the clinic through MyChart or phone. If you have a critical or abnormal lab result, we will notify you by phone as soon as possible.  Submit refill requests through M[employer] or call your pharmacy and they will forward the refill request to us. Please allow 3 business days for your refill to be completed.          Additional  Information About Your Visit        Unicahart Information     [employer] [place] gives you secure access to your electronic health record. If you see a primary care provider, you can also send messages to your care team and make appointments. If you have questions, please call your primary care clinic.  If you do not have a primary care provider, please call [phone] and they will assist you.        Care EveryWhere ID     This is your Care EveryWhere ID. This could be used by other organizations to access your [place] medical records  [id number]        Your Vitals Were     Pulse Respirations Pulse Oximetry             64 18 98%          Blood Pressure from Last 3 Encounters:   05/18/18 148/83   05/11/18 153/88   05/04/18 139/81    Weight from Last 3 Encounters:   05/04/18 74.6 kg (164 lb 8 oz)   01/03/18 74.4 kg (164 lb)   12/18/17 74.5 kg (164 lb 3.9 oz)              We Performed the Following     UA without Microscopic        Primary Care Provider Office Phone # Fax #    Bee Sarbjit [phone]       [facility] [address] [facility] [address]        Equal Access to Services     [facility] : Hadii aad ku hadasho Sobrigidoali, waaxda luqadaha, qaybta kaalmada adeegyada, saray berry . So [facility] [phone].    ATENCIÓN: Si habla español, tiene a combs disposición servicios gratuitos de asistencia lingüística. [facility] [phone].    We comply with applicable federal civil rights laws and [place] laws. We do not discriminate on the basis of race, color, national origin, age, disability, sex, sexual orientation, or gender identity.            Thank you!     Thank you for choosing [facility]  for your care. Our goal is always to provide you with excellent care. Hearing back from our patients is one way we can continue to improve our services. Please take a few minutes to complete the written survey that you may receive in the mail after your  visit with us. Thank you!             Your Updated Medication List - Protect others around you: Learn how to safely use, store and throw away your medicines at www.disposemymeds.org.          This list is accurate as of 5/18/18  1:48 PM.  Always use your most recent med list.                   Brand Name Dispense Instructions for use Diagnosis    colchicine 0.6 MG tablet    COLCYRS     1.2 mg at the first sign of flare, followed in 1 hour with a single dose of 0.6 mg (maximum: 1.8 mg within 1 hour)        glucosamine 500 MG Caps      Take 2 capsules by mouth every morning        indomethacin 25 MG capsule    INDOCIN     Take 25 mg by mouth        levofloxacin 500 MG tablet    LEVAQUIN    6 tablet    Take 1 tablet 6 hours post treatment and 1 tablet each AM following each treatment.    Malignant neoplasm of overlapping sites of bladder (H)       Multiple vitamin Tabs      Take 1 tablet by mouth every morning        OMEPRAZOLE PO      Take 20 mg by mouth daily        sildenafil 100 MG tablet    VIAGRA     Take 0.5-1 Tabs by mouth. Take it one hour before sexual activity.

## 2018-05-18 NOTE — PATIENT INSTRUCTIONS
Home discharge instructions:  -To make sure each treatment has the best chance of working, follow these steps 2 hours after each treatment   1. Lie on your back for 15 minues   2. Lie on your left side for 15 mintues   3. Lie on your right side for 15 minutes   4. Get up but keep the medication  In your bladder for 60 more minutes- for a total of 2 hours   5. Empty your bladder following the directions below (if you have difficulty holding your bladder it is ok to empty your bladder early)  -Wear pad if incontinence is a possibliity  -Wash hands throughly after using the bathroom  -For safety reasons remember to follow these directions for 6 hours after each treatment:   1. Sit on the toilet seat to urinate   2. Immediately after urinating- add 2 cups of undiluted chlorine bleach to the toilet    3. Close lid and let the bleach sit for 15 minutes each time   4. Drink plenty of water to flush any remaining medication out of your bladder    **These steps will help kill all the BCG bacteria and disinfect the toilet**    Please void BCG at 3:05    Take your antibiotic today at 7:05 pm and tomorrow morning.      Please call urology triage line at [phone] or [phone] with fevers or chills, burning with urination, or blood in your urine which lasts more than a 48-72 hours or with any question or concerns.        May 2018   Jay Monday Tuesday Wednesday Thursday Friday Saturday             1     2     3     4     UMP CYSTOSCOPY    1:25 PM   (20 min.)   Faustina Carey MD   [facility] Urology and Inst for Prostate and Urologic Cancers 5       6     7     8     9     10     11     UMP ONC INFUSION 120   12:00 PM   (120 min.)    ONCOLOGY INFUSION   [facility] 12       13     14     15     16     17     18     UMP ONC INFUSION 120   12:00 PM   (120 min.)    ONCOLOGY INFUSION   [facility] 19       20     21     22     23     24     25     UMP ONC INFUSION 120   12:00 PM    (120 min.)    ONCOLOGY [facility] 26       27     28     29     30     31 June 2018 Sunday Monday Tuesday Wednesday Thursday Friday Saturday                            1     2       3     4     5     6     7     8     9       10     11     12     13     14     15     16       17     18     19     20     21     22     23       24     25     26     27     28     29     30

## 2018-05-24 ENCOUNTER — TELEPHONE (OUTPATIENT)
Dept: UROLOGY | Facility: CLINIC | Age: 78
End: 2018-05-24

## 2018-05-24 NOTE — TELEPHONE ENCOUNTER
----- Message from Dread Harding sent at 5/24/2018  5:31 PM CDT -----  Regarding: RE: ? follow up      ----- Message -----     From: Shavon Swann RN     Sent: 5/24/2018   3:49 PM       To: Dread Harding  Subject: RE: ? follow up                                  WHAT?!  Sure, the 18th will be fine.  Thank you  ----- Message -----     From: Dread Harding     Sent: 5/24/2018   3:46 PM       To: Shavon Swann RN  Subject: RE: ? follow up                                  Dr. Carey is switching his schedule from 7/11 to 7/12 and seeing pt's in [facility] with no cystos.     Could I do July 18th   ----- Message -----     From: Shavon Swann RN     Sent: 5/24/2018   3:33 PM       To: [facility]  Subject: FW: ? follow up                                  Please call and schedule this patient with Dr. Carey for a Cysto on July 11th.  Ok to double book wherever.  Thank you  ----- Message -----     From: Tamera Chowdhury     Sent: 5/24/2018   3:11 PM       To: Juan Posada, RN, Shavon Swann RN  Subject: ? follow up                                      Lamonte Tirado comes for dose 3/3 BCG tomorrow.    He has no follow up cysto scheduled.    Thanks,  Tamera

## 2018-05-24 NOTE — TELEPHONE ENCOUNTER
Left a message for patient to call back to confirm appointment with Dr. Carey on July 18 at 2:05 PM for a Cystoscopy. Gave call back number.

## 2018-05-25 ENCOUNTER — INFUSION THERAPY VISIT (OUTPATIENT)
Dept: ONCOLOGY | Facility: CLINIC | Age: 78
End: 2018-05-25
Attending: UROLOGY
Payer: COMMERCIAL

## 2018-05-25 VITALS
HEART RATE: 71 BPM | DIASTOLIC BLOOD PRESSURE: 82 MMHG | TEMPERATURE: 97.5 F | SYSTOLIC BLOOD PRESSURE: 170 MMHG | OXYGEN SATURATION: 99 % | RESPIRATION RATE: 16 BRPM

## 2018-05-25 DIAGNOSIS — C67.8 MALIGNANT NEOPLASM OF OVERLAPPING SITES OF BLADDER (H): Primary | ICD-10-CM

## 2018-05-25 LAB
ALBUMIN UR-MCNC: 30 MG/DL
APPEARANCE UR: CLEAR
BILIRUB UR QL STRIP: NEGATIVE
COLOR UR AUTO: YELLOW
GLUCOSE UR STRIP-MCNC: NEGATIVE MG/DL
HGB UR QL STRIP: NEGATIVE
KETONES UR STRIP-MCNC: NEGATIVE MG/DL
LEUKOCYTE ESTERASE UR QL STRIP: ABNORMAL
NITRATE UR QL: NEGATIVE
PH UR STRIP: 6 PH (ref 5–7)
SOURCE: ABNORMAL
SP GR UR STRIP: 1.01 (ref 1–1.03)
UROBILINOGEN UR STRIP-MCNC: 0 MG/DL (ref 0–2)

## 2018-05-25 PROCEDURE — 88112 CYTOPATH CELL ENHANCE TECH: CPT | Performed by: UROLOGY

## 2018-05-25 PROCEDURE — 25000125 ZZHC RX 250: Mod: ZF | Performed by: UROLOGY

## 2018-05-25 PROCEDURE — 81003 URINALYSIS AUTO W/O SCOPE: CPT | Performed by: UROLOGY

## 2018-05-25 PROCEDURE — 88120 CYTP URNE 3-5 PROBES EA SPEC: CPT | Performed by: UROLOGY

## 2018-05-25 PROCEDURE — 51720 TREATMENT OF BLADDER LESION: CPT

## 2018-05-25 PROCEDURE — 25000128 H RX IP 250 OP 636: Mod: ZF | Performed by: UROLOGY

## 2018-05-25 RX ADMIN — BACILLUS CALMETTE-GUERIN 50 MG: 50 POWDER, FOR SUSPENSION INTRAVESICAL at 13:11

## 2018-05-25 RX ADMIN — LIDOCAINE HYDROCHLORIDE 10 ML: 20 JELLY TOPICAL at 12:50

## 2018-05-25 ASSESSMENT — PAIN SCALES - GENERAL: PAINLEVEL: NO PAIN (0)

## 2018-05-25 NOTE — MR AVS SNAPSHOT
After Visit Summary   5/25/2018    Max Zuñiga    MRN: [id number]           Patient Information     Date Of Birth          1940        Visit Information        Provider Department      5/25/2018 12:00 PM  BED 2 ATC; UC 11 ATC;  ONCOLOGY INFUSION [facility]        Today's Diagnoses     Malignant neoplasm of overlapping sites of bladder (H)    -  1      Care Instructions    Home discharge instructions:  -To make sure each treatment has the best chance of working, follow these steps 2 hours after each treatment   1. Lie on your back for 15 minues   2. Lie on your left side for 15 mintues   3. Lie on your right side for 15 minutes   4. Get up but keep the medication  In your bladder for 60 more minutes- for a total of 2 hours   5. Empty your bladder following the directions below (if you have difficulty holding your bladder it is ok to empty your bladder early)  -Wear pad if incontinence is a possibliity  -Wash hands throughly after using the bathroom  -For safety reasons remember to follow these directions for 6 hours after each treatment:   1. Sit on the toilet seat to urinate   2. Immediately after urinating- add 2 cups of undiluted chlorine bleach to the toilet    3. Close lid and let the bleach sit for 15 minutes each time   4. Drink plenty of water to flush any remaining medication out of your bladder    **These steps will help kill all the BCG bacteria and disinfect the toilet**    Please void BCG at 1515    Take your antibiotic today at 1915 pm and tomorrow morning.      Please call urology triage line at [phone] or [phone] with fevers or chills, burning with urination, or blood in your urine which lasts more than a 48-72 hours or with any question or concerns.        May 2018   Jay Monday Tuesday Wednesday Thursday Friday Saturday             1     2     3     4     UMP CYSTOSCOPY    1:25 PM   (20 min.)   Faustina Carey MD   [facility] Prostate [facility] Urologic Cancers 5       6     7     8     9     10     11     UMP ONC INFUSION 120   12:00 PM   (120 min.)    ONCOLOGY INFUSION   [facility] 12       13     14     15     16     17     18     UMP ONC INFUSION 120   12:00 PM   (120 min.)    ONCOLOGY INFUSION   [facility] 19       20     21     22     23     24     25     UMP ONC INFUSION 120   12:00 PM   (120 min.)    ONCOLOGY INFUSION   [facility] 26       27     28     29     30     31 June 2018 Sunday Monday Tuesday Wednesday Thursday Friday Saturday                            1     2       3     4     5     6     7     8     9       10     11     12     13     14     15     16       17     18     19     20     21     22     23       24     25     26     27     28     29     30                                        Follow-ups after your visit        Your next 10 appointments already scheduled     Jul 18, 2018  2:20 PM CDT   (Arrive by 2:05 PM)   Cystoscopy with Faustina Carey MD   [facility] Prostate and Urologic Cancers ([facility])    [address] [address] [phone]              Who to contact     If you have questions or need follow up information about today's clinic visit or your schedule please contact [facility] directly at [phone].  Normal or non-critical lab and imaging results will be communicated to you by MyChart, letter or phone within 4 business days after the clinic has received the results. If you do not hear from us within 7 days, please contact the clinic through MyChart or phone. If you have a critical or abnormal lab result, we will notify you by phone as soon as possible.  Submit refill requests through [employer] or call your pharmacy and they will forward the refill request to us. Please allow 3 business days for  your refill to be completed.          Additional Information About Your Visit        MyChart Information     [employer] gives you secure access to your electronic health record. If you see a primary care provider, you can also send messages to your care team and make appointments. If you have questions, please call your primary care clinic.  If you do not have a primary care provider, please call [phone] and they will assist you.        Care EveryWhere ID     This is your Care EveryWhere ID. This could be used by other organizations to access your [place] medical records  [id number]        Your Vitals Were     Pulse Temperature Respirations Pulse Oximetry          71 97.5  F (36.4  C) 16 99%         Blood Pressure from Last 3 Encounters:   05/25/18 170/82   05/18/18 148/83   05/11/18 153/88    Weight from Last 3 Encounters:   05/04/18 74.6 kg (164 lb 8 oz)   01/03/18 74.4 kg (164 lb)   12/18/17 74.5 kg (164 lb 3.9 oz)              We Performed the Following     Cytology Non Gyn     FISH BLADDER CANCER     UA without Microscopic        Primary Care Provider Office Phone # Fax #    Bee Schaffer [phone]       [facility] [address] [place] [address]        Equal Access to Services     CARMINA BARNARD : Hadii aad ku hadasho Soomaali, waaxda luqadaha, qaybta kaalmada adeegyada, waxay mukundin haymaddyn gerry trevizo. So [facility] [phone].    ATENCIÓN: Si habla español, tiene a combs disposición servicios gratuitos de asistencia lingüística. Llame al [phone].    We comply with applicable federal civil rights laws and [place] laws. We do not discriminate on the basis of race, color, national origin, age, disability, sex, sexual orientation, or gender identity.            Thank you!     Thank you for choosing [facility]  for your care. Our goal is always to provide you with excellent care. Hearing back from our patients is one way we can continue to  improve our services. Please take a few minutes to complete the written survey that you may receive in the mail after your visit with us. Thank you!             Your Updated Medication List - Protect others around you: Learn how to safely use, store and throw away your medicines at www.disposemymeds.org.          This list is accurate as of 5/25/18  3:24 PM.  Always use your most recent med list.                   Brand Name Dispense Instructions for use Diagnosis    colchicine 0.6 MG tablet    COLCYRS     1.2 mg at the first sign of flare, followed in 1 hour with a single dose of 0.6 mg (maximum: 1.8 mg within 1 hour)        glucosamine 500 MG Caps      Take 2 capsules by mouth every morning        indomethacin 25 MG capsule    INDOCIN     Take 25 mg by mouth        levofloxacin 500 MG tablet    LEVAQUIN    6 tablet    Take 1 tablet 6 hours post treatment and 1 tablet each AM following each treatment.    Malignant neoplasm of overlapping sites of bladder (H)       Multiple vitamin Tabs      Take 1 tablet by mouth every morning        OMEPRAZOLE PO      Take 20 mg by mouth daily        sildenafil 100 MG tablet    VIAGRA     Take 0.5-1 Tabs by mouth. Take it one hour before sexual activity.

## 2018-05-25 NOTE — PATIENT INSTRUCTIONS
Home discharge instructions:  -To make sure each treatment has the best chance of working, follow these steps 2 hours after each treatment   1. Lie on your back for 15 minues   2. Lie on your left side for 15 mintues   3. Lie on your right side for 15 minutes   4. Get up but keep the medication  In your bladder for 60 more minutes- for a total of 2 hours   5. Empty your bladder following the directions below (if you have difficulty holding your bladder it is ok to empty your bladder early)  -Wear pad if incontinence is a possibliity  -Wash hands throughly after using the bathroom  -For safety reasons remember to follow these directions for 6 hours after each treatment:   1. Sit on the toilet seat to urinate   2. Immediately after urinating- add 2 cups of undiluted chlorine bleach to the toilet    3. Close lid and let the bleach sit for 15 minutes each time   4. Drink plenty of water to flush any remaining medication out of your bladder    **These steps will help kill all the BCG bacteria and disinfect the toilet**    Please void BCG at 1515    Take your antibiotic today at 1915 pm and tomorrow morning.      Please call urology triage line at [phone] or [phone] with fevers or chills, burning with urination, or blood in your urine which lasts more than a 48-72 hours or with any question or concerns.        May 2018   Jay Monday Tuesday Wednesday Thursday Friday Saturday             1     2     3     4     UMP CYSTOSCOPY    1:25 PM   (20 min.)   Faustina Carey MD   [facility] for Prostate and Urologic Cancers 5       6     7     8     9     10     11     UMP ONC INFUSION 120   12:00 PM   (120 min.)    ONCOLOGY INFUSION   [facility] 12       13     14     15     16     17     18     UMP ONC INFUSION 120   12:00 PM   (120 min.)    ONCOLOGY INFUSION   [facility] 19       20     21     22     23     24     25     UMP ONC INFUSION 120   12:00 PM    (120 min.)    ONCOLOGY [facility] 26       27     28     29     30     31 June 2018 Sunday Monday Tuesday Wednesday Thursday Friday Saturday                            1     2       3     4     5     6     7     8     9       10     11     12     13     14     15     16       17     18     19     20     21     22     23       24     25     26     27     28     29     30

## 2018-05-25 NOTE — PROGRESS NOTES
Infusion Nursing Note:  Max Zuñiga presents today for BCG dose 3 of 3.    Patient seen by provider today: No    Note: Was able to hold his urine for 2 hours post last treatment.  Denies fevers/chills.  Reports some burning with urination off and on, but this is not new and it has not increased.  Denies seeing any visible blood in urine.  Has 2 Levaquin tabs to complete this series of treatments.    BP a bit elevated today which he attributes to being embarrassed about accidentally spilling his first urine sample.  I assured him this was OK.      Treatment Conditions:  UA meets parameters. OK to treat.  UA RESULTS:  Recent Labs   Lab Test  05/25/18   1100   12/13/17   0952   COLOR  Yellow   < >  Yellow   APPEARANCE  Clear   < >  Clear   URINEGLC  Negative   < >  Negative   URINEBILI  Negative   < >  Negative   URINEKETONE  Negative   < >  Negative   SG  1.010   < >  1.012   UBLD  Negative   < >  Negative   URINEPH  6.0   < >  6.0   PROTEIN  30*   < >  30*   NITRITE  Negative   < >  Negative   LEUKEST  Small*   < >  Trace*   RBCU   --    --   2   WBCU   --    --   12*    < > = values in this interval not displayed.     .    Pre-procedure Assessment:  Dysuria: No  Hematuria: No  Fever/chills: No  Increased urinary urgency: No  Increased urinary frequency: No    Lidocaine urojet 2% 10 ml used: YES  Catheter placed using sterile technique: 14 English red blackburn    Post-procedure Assessment:  Patient tolerated bladder instillation without incident.    Patient instructed on the following and verbalized understanding:   Medication to remain in the bladder for 2 hours post instillation: YES  Post instillation side effects and precautions discussed: YES    Discharge Plan:   Patient declined prescription refills.  AVS to patient via [employer].  Patient will return 7/18/18 for cysto for next appointment.   Patient discharged in stable condition accompanied by: self.  Departure Mode: Ambulatory.  Face to Face time: 0.    Tamera  JOSEPH Chowdhury RN

## 2018-06-01 LAB — COPATH REPORT: NORMAL

## 2018-07-02 LAB — LAB SCANNED RESULT: ABNORMAL

## 2018-07-16 ENCOUNTER — PRE VISIT (OUTPATIENT)
Dept: UROLOGY | Facility: CLINIC | Age: 78
End: 2018-07-16

## 2018-07-16 NOTE — TELEPHONE ENCOUNTER
Reason for visit: cytsoscopy                  Relevant information: bladder cancer, pt completed 3 courses of BCG     Records/imaging/labs: all records available     Pt called: No need for a call     Rooming: collect a cytology

## 2018-07-18 ENCOUNTER — OFFICE VISIT (OUTPATIENT)
Dept: UROLOGY | Facility: CLINIC | Age: 78
End: 2018-07-18
Payer: COMMERCIAL

## 2018-07-18 VITALS
WEIGHT: 166.9 LBS | BODY MASS INDEX: 24.72 KG/M2 | DIASTOLIC BLOOD PRESSURE: 77 MMHG | HEIGHT: 69 IN | SYSTOLIC BLOOD PRESSURE: 131 MMHG | HEART RATE: 84 BPM

## 2018-07-18 DIAGNOSIS — C67.8 MALIGNANT NEOPLASM OF OVERLAPPING SITES OF BLADDER (H): Primary | ICD-10-CM

## 2018-07-18 ASSESSMENT — PAIN SCALES - GENERAL
PAINLEVEL: NO PAIN (0)
PAINLEVEL: NO PAIN (0)

## 2018-07-18 NOTE — PROGRESS NOTES
Pre-procedure diagnosis: Bladder cancer HG T1 post induction BCG  Post procedure diagnosis: normal cystoscopy  Procedure performed: cystoscopy  Surgeon: EVE Carey MD  Anesthesia: local     Indications for procedure: Patient is a 77 year old male with a history of bladder cancer  Here today for surveillance cysto     Description of procedure: After fully informed voluntary consent was obtained patient was brought into the procedure room, identified and placed in a supine position on the cysto table.  The groin/scrotum were prepped and draped in a sterile fashion with betadine.  A 15F flexible cystoscope was inserted into the urethra and the bladder and urethra examined in a systematic manner.  There were no tumor stones or diverticula.  Ureteric orifices were normal in position and number and effluxing clear urine.  The prostate was 3 cm long and showed bilobar hypertrophy.  There was a median lobe.  Distal urethra was normal.  The patient tolerated the procedure well and there were no complications.       Assessment/Plan: Patient with a history of bladder cancer with negative cystoscopy.  Follow up in 3 months for surveillance.  We will obtain a urine cytology and FISH today.

## 2018-07-18 NOTE — MR AVS SNAPSHOT
"              After Visit Summary   7/18/2018    Max Zuñiga    MRN: [id number]           Patient Information     Date Of Birth          1940        Visit Information        Provider Department      7/18/2018 2:20 PM Faustina Carey MD [facility] for Prostate and Urologic Cancers        Today's Diagnoses     Malignant neoplasm of overlapping sites of bladder (H)    -  1      Care Instructions    Return in 3 months for a cystoscopy      AFTER YOUR CYSTOSCOPY        You have just completed a cystoscopy, or \"cysto\", which allowed your physician to learn more about your bladder (or to remove a stent placed after surgery). We suggest that you continue to avoid caffeine, fruit juice, and alcohol for the next 24 hours, however, you are encouraged to return to your normal activities.         A few things that are considered normal after your cystoscopy:     * Small amount of bleeding (or spotting) that clears within the next 24 hours     * Slight burning sensation with urination     * Sensation to of needing to avoid more frequently     * The feeling of \"air\" in your urine     * Mild discomfort that is relieved with Tylenol        Please contact our office promptly if you:     * Develop a fever above 101 degrees     * Are unable to urinate     * Develop bright red blood that does not stop     * Severe pain or swelling         Please contact our office with any concerns or questions @[facility].          Follow-ups after your visit        Follow-up notes from your care team     Return in 12 months (on 7/18/2019).      Who to contact     Please call your clinic at [phone] to:    Ask questions about your health    Make or cancel appointments    Discuss your medicines    Learn about your test results    Speak to your doctor            Additional Information About Your Visit        MyChart Information     [employer] gives you secure access to your electronic health record. If you see a primary care " "provider, you can also send messages to your care team and make appointments. If you have questions, please call your primary care clinic.  If you do not have a primary care provider, please call [phone] and they will assist you.      [employer] is an electronic gateway that provides easy, online access to your medical records. With [employer], you can request a clinic appointment, read your test results, renew a prescription or communicate with your care team.     To access your existing account, please contact your [facility] or call [phone] for assistance.        Care EveryWhere ID     This is your Care EveryWhere ID. This could be used by other organizations to access your [place] medical records  [id number]        Your Vitals Were     Pulse Height BMI (Body Mass Index)             84 1.753 m (5' 9\") 24.65 kg/m2          Blood Pressure from Last 3 Encounters:   07/18/18 131/77   05/25/18 170/82   05/18/18 148/83    Weight from Last 3 Encounters:   07/18/18 75.7 kg (166 lb 14.4 oz)   05/04/18 74.6 kg (164 lb 8 oz)   01/03/18 74.4 kg (164 lb)              We Performed the Following     CYSTOURETHROSCOPY        Primary Care Provider Office Phone # Fax #    Bee Schaffer [phone]       [facility] [address] [facility] [address]        Equal Access to Services     CARMINA BARNARD : Hadii aad ku hadasho Soomaali, waaxda luqadaha, qaybta kaalmada adeegyada, saray berry . So [facility] [phone].    ATENCIÓN: Si habla español, tiene a combs disposición servicios gratuitos de asistencia lingüística. Llame al [phone].    We comply with applicable federal civil rights laws and [place] laws. We do not discriminate on the basis of race, color, national origin, age, disability, sex, sexual orientation, or gender identity.            Thank you!     Thank you for choosing [facility] UROLOGY AND [facility] FOR PROSTATE AND UROLOGIC " CANCERS  for your care. Our goal is always to provide you with excellent care. Hearing back from our patients is one way we can continue to improve our services. Please take a few minutes to complete the written survey that you may receive in the mail after your visit with us. Thank you!             Your Updated Medication List - Protect others around you: Learn how to safely use, store and throw away your medicines at www.Subblimeemymeds.org.          This list is accurate as of 7/18/18  2:27 PM.  Always use your most recent med list.                   Brand Name Dispense Instructions for use Diagnosis    colchicine 0.6 MG tablet    COLCYRS     1.2 mg at the first sign of flare, followed in 1 hour with a single dose of 0.6 mg (maximum: 1.8 mg within 1 hour)        glucosamine 500 MG Caps      Take 2 capsules by mouth every morning        indomethacin 25 MG capsule    INDOCIN     Take 25 mg by mouth        levofloxacin 500 MG tablet    LEVAQUIN    6 tablet    Take 1 tablet 6 hours post treatment and 1 tablet each AM following each treatment.    Malignant neoplasm of overlapping sites of bladder (H)       MAGNESIUM OXIDE PO           Multiple vitamin Tabs      Take 1 tablet by mouth every morning        OMEPRAZOLE PO      Take 20 mg by mouth daily        sildenafil 100 MG tablet    VIAGRA     Take 0.5-1 Tabs by mouth. Take it one hour before sexual activity.

## 2018-07-18 NOTE — NURSING NOTE
"Chief Complaint   Patient presents with     Cystoscopy     Bladder cancer       Blood pressure 131/77, pulse 84, height 1.753 m (5' 9\"), weight 75.7 kg (166 lb 14.4 oz). Body mass index is 24.65 kg/(m^2).    Patient Active Problem List   Diagnosis     Malignant neoplasm of overlapping sites of bladder (H)       No Known Allergies    Current Outpatient Prescriptions   Medication Sig Dispense Refill     MAGNESIUM OXIDE PO        colchicine 0.6 MG tablet 1.2 mg at the first sign of flare, followed in 1 hour with a single dose of 0.6 mg (maximum: 1.8 mg within 1 hour)       glucosamine 500 MG CAPS Take 2 capsules by mouth every morning        indomethacin (INDOCIN) 25 MG capsule Take 25 mg by mouth       levofloxacin (LEVAQUIN) 500 MG tablet Take 1 tablet 6 hours post treatment and 1 tablet each AM following each treatment. 6 tablet 6     Multiple vitamin TABS Take 1 tablet by mouth every morning        OMEPRAZOLE PO Take 20 mg by mouth daily       sildenafil (VIAGRA) 100 MG tablet Take 0.5-1 Tabs by mouth. Take it one hour before sexual activity.         Social History   Substance Use Topics     Smoking status: Former Smoker     Packs/day: 2.00     Years: 20.00     Types: Cigarettes     Smokeless tobacco: Never Used      Comment: quit in      Alcohol use Yes      Comment: 2 beers daily       DICK Rogers  2018  1:58 PM         Invasive Procedure Safety Checklist:    Procedure:     Action: Complete sections and checkboxes as appropriate.    Pre-procedure:  1. Patient ID Verified with 2 identifiers (Arely and  or MRN) : YES    2. Procedure and site verified with patient/designee (when able) : YES    3. Accurate consent documentation in medical record : YES    4. H&P (or appropriate assessment) documented in medical record : YES  H&P must be up to 30 days prior to procedure an updated within 24 hours of                 Procedure as applicable.     5. Relevant diagnostic and radiology test results " appropriately labeled and displayed as applicable : YES    6. Blood products, implants, devices, and/or special equipment available for the procedure as applicable : YES    7. Procedure site(s) marked with provider initials [Exclusions:   None] : NO    8. Marking not required. Reason : Yes  Procedure does not require site marking    Time Out:     Time-Out performed immediately prior to starting procedure, including verbal and active participation of all team members addressing: YES    1. Correct patient identity.  2. Confirmed that the correct side and site are marked.  3. An accurate procedure to be done.  4. Agreement on the procedure to be done.  5. Correct patient position.  6. Relevant images and results are properly labeled and appropriately displayed.  7. The need to administer antibiotics or fluids for irrigation purposes during the procedure as applicable.  8. Safety precautions based on patient history or medication use.    During Procedure: Verification of correct person, site, and procedure occurs any time the responsibility for care of the patient is transferred to another member of the care team.    The following medication was given:     MEDICATION:  Lidocaine 2% jelly  ROUTE: topical  SITE: urethra via the meatus  DOSE: 10 mL  LOT #: [id number]  : IMS, Limited  EXPIRATION DATE: 03/20  NDC#: [address]-8   Was there drug waste? No  Multi-dose vial: Lubna Smallwood CMA  July 18, 2018

## 2018-07-18 NOTE — LETTER
7/18/2018       RE: Max Zuñiga  [address]  [facility] [address]     Dear Colleague,    Thank you for referring your patient, Max Zuñiga, to the [facility] UROLOGY AND [facility] FOR PROSTATE AND UROLOGIC CANCERS at [facility]. Please see a copy of my visit note below.        Pre-procedure diagnosis: Bladder cancer HG T1 post induction BCG  Post procedure diagnosis: normal cystoscopy  Procedure performed: cystoscopy  Surgeon: EVE Carey MD  Anesthesia: local     Indications for procedure: Patient is a 77 year old male with a history of bladder cancer  Here today for surveillance cysto     Description of procedure: After fully informed voluntary consent was obtained patient was brought into the procedure room, identified and placed in a supine position on the cysto table.  The groin/scrotum were prepped and draped in a sterile fashion with betadine.  A 15F flexible cystoscope was inserted into the urethra and the bladder and urethra examined in a systematic manner.  There were no tumor stones or diverticula.  Ureteric orifices were normal in position and number and effluxing clear urine.  The prostate was 3 cm long and showed bilobar hypertrophy.  There was a median lobe.  Distal urethra was normal.  The patient tolerated the procedure well and there were no complications.       Assessment/Plan: Patient with a history of bladder cancer with negative cystoscopy.  Follow up in 3 months for surveillance.  We will obtain a urine cytology and FISH today.     Again, thank you for allowing me to participate in the care of your patient.      Sincerely,    Faustina Carey MD

## 2018-07-18 NOTE — PATIENT INSTRUCTIONS
"Return in 3 months for a cystoscopy      AFTER YOUR CYSTOSCOPY        You have just completed a cystoscopy, or \"cysto\", which allowed your physician to learn more about your bladder (or to remove a stent placed after surgery). We suggest that you continue to avoid caffeine, fruit juice, and alcohol for the next 24 hours, however, you are encouraged to return to your normal activities.         A few things that are considered normal after your cystoscopy:     * Small amount of bleeding (or spotting) that clears within the next 24 hours     * Slight burning sensation with urination     * Sensation to of needing to avoid more frequently     * The feeling of \"air\" in your urine     * Mild discomfort that is relieved with Tylenol        Please contact our office promptly if you:     * Develop a fever above 101 degrees     * Are unable to urinate     * Develop bright red blood that does not stop     * Severe pain or swelling         Please contact our office with any concerns or questions @[email or web address].  "

## 2018-07-26 LAB — COPATH REPORT: NORMAL

## 2018-11-16 ENCOUNTER — PRE VISIT (OUTPATIENT)
Dept: UROLOGY | Facility: CLINIC | Age: 78
End: 2018-11-16

## 2018-11-16 NOTE — TELEPHONE ENCOUNTER
Patient with history of bladder cancer coming in for cystoscopy. Patient chart reviewed, no need for call, all records available and ready for appointment.  Get urine prior to cystoscopy.

## 2018-11-26 ENCOUNTER — OFFICE VISIT (OUTPATIENT)
Dept: UROLOGY | Facility: CLINIC | Age: 78
End: 2018-11-26
Payer: COMMERCIAL

## 2018-11-26 VITALS
DIASTOLIC BLOOD PRESSURE: 88 MMHG | SYSTOLIC BLOOD PRESSURE: 150 MMHG | BODY MASS INDEX: 25.42 KG/M2 | WEIGHT: 171.6 LBS | HEART RATE: 58 BPM | HEIGHT: 69 IN

## 2018-11-26 DIAGNOSIS — C67.3 MALIGNANT NEOPLASM OF ANTERIOR WALL OF URINARY BLADDER (H): Primary | ICD-10-CM

## 2018-11-26 ASSESSMENT — PAIN SCALES - GENERAL: PAINLEVEL: NO PAIN (0)

## 2018-11-26 NOTE — PROGRESS NOTES
PRE-PROCEDURE DIAGNOSIS: high grade pT1 bladder ca  POST-PROCEDURE DIAGNOSIS: same  PROCEDURE: Cystoscopy  HISTORY: Max Zuñiga is a 78 year old male with a history of high grade pT1 bladder cancer. He completed one course of induction BCG and his last round of maintenance BCG was   Pathology: high grade pT1 (11/15/17)  Most recent resection: 17: no evidence of malignancy  Last surveillance cysto: 18  Last upper tract imagin17  DESCRIPTION OF PROCEDURE: After informed consent was obtained, the patient was brought to the procedure room where he was placed in the supine position with all pressure points well padded.  The penis and scrotum were prepped and draped in a sterile fashion. A flexible cystoscope was introduced through a well-lubricated urethra. Anterior urethra strictures were absent.   The urinary sphincter was intact.  The prostate demonstrated mild hypertrophy.  Bladder signififcant for the following:      Diverticuli: present numerous small      Cellules: absent      Trabeculation: present throughout      Tumors: absent      Stones: absent  The flexible cystoscope was removed and the findings were described to the patient.   ASSESSMENT AND PLAN: 78 year old male with bladder cancer. Will plan for another cycle of BCG. Will plan to see in 6 months for next surveillance cysto, CT urogram.

## 2018-11-26 NOTE — LETTER
2018       RE: Max Zuñiga  [address]  [facility] [address]     Dear Colleague,    Thank you for referring your patient, Max Zuñiga, to the [facility] UROLOGY AND [facility] FOR PROSTATE AND UROLOGIC CANCERS at [facility]. Please see a copy of my visit note below.        PRE-PROCEDURE DIAGNOSIS: high grade pT1 bladder ca  POST-PROCEDURE DIAGNOSIS: same  PROCEDURE: Cystoscopy  HISTORY: Max Zuñiga is a 78 year old male with a history of high grade pT1 bladder cancer. He completed one course of induction BCG and his last round of maintenance BCG was   Pathology: high grade pT1 (11/15/17)  Most recent resection: 17: no evidence of malignancy  Last surveillance cysto: 18  Last upper tract imagin17  DESCRIPTION OF PROCEDURE: After informed consent was obtained, the patient was brought to the procedure room where he was placed in the supine position with all pressure points well padded.  The penis and scrotum were prepped and draped in a sterile fashion. A flexible cystoscope was introduced through a well-lubricated urethra. Anterior urethra strictures were absent.   The urinary sphincter was intact.  The prostate demonstrated mild hypertrophy.  Bladder signififcant for the following:      Diverticuli: present numerous small      Cellules: absent      Trabeculation: present throughout      Tumors: absent      Stones: absent  The flexible cystoscope was removed and the findings were described to the patient.   ASSESSMENT AND PLAN: 78 year old male with bladder cancer. Will plan for another cycle of BCG. Will plan to see in 6 months for next surveillance cysto, CT urogram.     Again, thank you for allowing me to participate in the care of your patient.      Sincerely,    Feng Card MD

## 2018-11-26 NOTE — NURSING NOTE
Invasive Procedure Safety Checklist:    Procedure: Cystoscopy    Action: Complete sections and checkboxes as appropriate.    Pre-procedure:  1. Patient ID Verified with 2 identifiers (Arely and  or MRN) : YES    2. Procedure and site verified with patient/designee (when able) : YES    3. Accurate consent documentation in medical record : YES    4. H&P (or appropriate assessment) documented in medical record : NO  H&P must be up to 30 days prior to procedure an updated within 24 hours of                 Procedure as applicable.     5. Relevant diagnostic and radiology test results appropriately labeled and displayed as applicable : NO    6. Blood products, implants, devices, and/or special equipment available for the procedure as applicable : YES    7. Procedure site(s) marked with provider initials [Exclusions: none      8. Marking not required. Reason : Yes  Procedure does not require site marking    Time Out:     Time-Out performed immediately prior to starting procedure, including verbal and active participation of all team members addressing: YES        1. Correct patient identity.  2. Confirmed that the correct side and site are marked.  3. An accurate procedure to be done.  4. Agreement on the procedure to be done.  5. Correct patient position.  6. Relevant images and results are properly labeled and appropriately displayed.  7. The need to administer antibiotics or fluids for irrigation purposes during the procedure as applicable.  8. Safety precautions based on patient history or medication use.    During Procedure: Verification of correct person, site, and procedure occurs any time the responsibility for care of the patient is transferred to another member of the care team.  The following medication was given:   Lidocaine jelly  MEDICATION:  Lidocaine  ROUTE: uretral  SITE: urethral  DOSE: 10ml    LOT #: [id number]  : Jr ROSA.  iXPIRATION DATE:   NDC#: [id number]   Was there drug waste?  No      Kirsty Agarwal, MOHAN  November 26, 2018

## 2018-11-26 NOTE — PATIENT INSTRUCTIONS
"  AFTER YOUR CYSTOSCOPY        You have just completed a cystoscopy, or \"cysto\", which allowed your physician to learn more about your bladder (or to remove a stent placed after surgery). We suggest that you continue to avoid caffeine, fruit juice, and alcohol for the next 24 hours, however, you are encouraged to return to your normal activities.         A few things that are considered normal after your cystoscopy:     * Small amount of bleeding (or spotting) that clears within the next 24 hours     * Slight burning sensation with urination     * Sensation to of needing to avoid more frequently     * The feeling of \"air\" in your urine     * Mild discomfort that is relieved with Tylenol        Please contact our office promptly if you:     * Develop a fever above 101 degrees     * Are unable to urinate     * Develop bright red blood that does not stop     * Severe pain or swelling      The RN will be cintacting you to schedule your BCG treatments.    Please follow up in 6 months to see Dr. Card after imaging.    It was a pleasure meeting with you today.  Thank you for allowing me and my team the privilege of caring for you today.  YOU are the reason we are here, and I truly hope we provided you with the excellent service you deserve.  Please let us know if there is anything else we can do for you so that we can be sure you are leaving completely satisfied with your care experience.        Kirsty Agarwal LPN    "

## 2018-11-26 NOTE — MR AVS SNAPSHOT
"              After Visit Summary   11/26/2018    Max Zuñiga    MRN: [id number]           Patient Information     Date Of Birth          1940        Visit Information        Provider Department      11/26/2018 12:00 PM Feng Card MD [facility] Urology and [facility] for Prostate and Urologic Cancers        Today's Diagnoses     Malignant neoplasm of anterior wall of urinary bladder (H)    -  1      Care Instructions      AFTER YOUR CYSTOSCOPY        You have just completed a cystoscopy, or \"cysto\", which allowed your physician to learn more about your bladder (or to remove a stent placed after surgery). We suggest that you continue to avoid caffeine, fruit juice, and alcohol for the next 24 hours, however, you are encouraged to return to your normal activities.         A few things that are considered normal after your cystoscopy:     * Small amount of bleeding (or spotting) that clears within the next 24 hours     * Slight burning sensation with urination     * Sensation to of needing to avoid more frequently     * The feeling of \"air\" in your urine     * Mild discomfort that is relieved with Tylenol        Please contact our office promptly if you:     * Develop a fever above 101 degrees     * Are unable to urinate     * Develop bright red blood that does not stop     * Severe pain or swelling      The RN will be cintacting you to schedule your BCG treatments.    Please follow up in 6 months to see Dr. Card after imaging.    It was a pleasure meeting with you today.  Thank you for allowing me and my team the privilege of caring for you today.  YOU are the reason we are here, and I truly hope we provided you with the excellent service you deserve.  Please let us know if there is anything else we can do for you so that we can be sure you are leaving completely satisfied with your care experience.        Kirsty Agarwal LPN            Follow-ups after your visit        Follow-up notes from your care team  "    Return in 6 months (on 5/26/2019).      Your next 10 appointments already scheduled     Jun 03, 2019 12:00 PM CDT   LAB with UC LAB   [facility] ([facility])    [address] [place] [id number] [phone]           Please do not eat 10-12 hours before your appointment if you are coming in fasting for labs on lipids, cholesterol, or glucose (sugar). This does not apply to pregnant women. Water, hot tea and black coffee (with nothing added) are okay. Do not drink other fluids, diet soda or chew gum.            Jun 03, 2019 12:20 PM CDT   CT ABDOMEN PELVIS W/O & W CONTRAST with UCCT2   [facility] CT ([facility])    [address] [place] [id number] [phone]           How do I prepare for my exam? (Food and drink instructions) To prepare: Do not eat or drink for 2 hours before your exam. If you need to take medicine, you may take it with small sips of water. (We may ask you to take liquid medicine as well.)  How do I prepare for my exam? (Other instructions) Please arrive 30 minutes early for your CT.  Once in the department you might be asked to drink water 15-20 minutes prior to your exam.  If indicated you may be asked to drink an oral contrast in advance of your CT.  If this is the case, the imaging team will let you know or be in contact with you prior to your appointment  Patients over 70 or patients with diabetes or kidney problems: If you haven t had a blood test (creatinine test) within the last 30 days, the Cardiologist/Radiologist may require you to get this test prior to your exam.  If you have diabetes:  Continue to take your metformin medication on the day of your exam  What should I wear: Please wear loose clothing, such as a sweat suit or jogging clothes. Avoid snaps, zippers and other metal. We may ask you to undress and put on a hospital gown.  How long does the exam take:  Most scans take less than 20 minutes.  What should I bring: Please bring any scans or X-rays taken at other hospitals, if similar tests were done. Also bring a list of your medicines, including vitamins, minerals and over-the-counter drugs. It is safest to leave personal items at home.  Do I need a : No  is needed.  What do I need to tell my doctor? Be sure to tell your doctor: * If you have any allergies. * If there s any chance you are pregnant. * If you are breastfeeding.  What should I do after the exam: No restrictions, You may resume normal activities.  What is this test: A CT (computed tomography) scan is a series of pictures that allows us to look inside your body. The scanner creates images of the body in cross sections, much like slices of bread. This helps us see any problems more clearly. You may receive contrast (X-ray dye) before or during your scan. You will be asked to drink the contrast.  Who should I call with questions: If you have any questions, please call the Imaging Department where you will have your exam. Directions, parking instructions, and other information is available on our website, [email or web address].            Jun 03, 2019  1:00 PM CDT   (Arrive by 12:45 PM)   Return Visit with Feng Card MD   [facility] and [facility] for Prostate and Urologic Cancers ([facility])    [address] [place] [id number] [phone]              Future tests that were ordered for you today     Open Future Orders        Priority Expected Expires Ordered    CT Urogram Routine  11/26/2019 11/26/2018            Who to contact     Please call your clinic at [phone] to:    Ask questions about your health    Make or cancel appointments    Discuss your medicines    Learn about your test results    Speak to your doctor            Additional Information About Your Visit        [employer] Information     [employer] gives you secure access  "to your electronic health record. If you see a primary care provider, you can also send messages to your care team and make appointments. If you have questions, please call your primary care clinic.  If you do not have a primary care provider, please call [phone] and they will assist you.      [employer] is an electronic gateway that provides easy, online access to your medical records. With [employer], you can request a clinic appointment, read your test results, renew a prescription or communicate with your care team.     To access your existing account, please contact your [facility] or call [phone] for assistance.        Care EveryWhere ID     This is your Care EveryWhere ID. This could be used by other organizations to access your [facility] medical records  [id number]        Your Vitals Were     Pulse Height BMI (Body Mass Index)             58 1.753 m (5' 9\") 25.34 kg/m2          Blood Pressure from Last 3 Encounters:   11/26/18 150/88   07/18/18 131/77   05/25/18 170/82    Weight from Last 3 Encounters:   11/26/18 77.8 kg (171 lb 9.6 oz)   07/18/18 75.7 kg (166 lb 14.4 oz)   05/04/18 74.6 kg (164 lb 8 oz)              We Performed the Following     CYSTOURETHROSCOPY     Cytology non gyn        Primary Care Provider Office Phone # Fax #    Bee Schaffer [phone]       [facility] [address] [place] [address]        Equal Access to Services     CARMINA BARNARD : Hadii aad ku hadasho Soomaali, waaxda luqadaha, qaybta kaalmada adeegyada, saray berry . So [facility] [phone].    ATENCIÓN: Si habla español, tiene a combs disposición servicios gratuitos de asistencia lingüística. Llame al [phone].    We comply with applicable federal civil rights laws and [place] laws. We do not discriminate on the basis of race, color, national origin, age, disability, sex, sexual orientation, or gender identity.            Thank " you!     Thank you for choosing [facility] UROLOGY AND [facility] FOR PROSTATE AND UROLOGIC CANCERS  for your care. Our goal is always to provide you with excellent care. Hearing back from our patients is one way we can continue to improve our services. Please take a few minutes to complete the written survey that you may receive in the mail after your visit with us. Thank you!             Your Updated Medication List - Protect others around you: Learn how to safely use, store and throw away your medicines at www.disposemymeds.org.          This list is accurate as of 11/26/18 12:40 PM.  Always use your most recent med list.                   Brand Name Dispense Instructions for use Diagnosis    colchicine 0.6 MG tablet    COLCYRS     1.2 mg at the first sign of flare, followed in 1 hour with a single dose of 0.6 mg (maximum: 1.8 mg within 1 hour)        glucosamine 500 MG Caps      Take 2 capsules by mouth every morning        indomethacin 25 MG capsule    INDOCIN     Take 25 mg by mouth        levofloxacin 500 MG tablet    LEVAQUIN    6 tablet    Take 1 tablet 6 hours post treatment and 1 tablet each AM following each treatment.    Malignant neoplasm of overlapping sites of bladder (H)       MAGNESIUM OXIDE PO           Multiple vitamin Tabs      Take 1 tablet by mouth every morning        OMEPRAZOLE PO      Take 20 mg by mouth daily        sildenafil 100 MG tablet    VIAGRA     Take 0.5-1 Tabs by mouth. Take it one hour before sexual activity.

## 2018-12-03 LAB — COPATH REPORT: NORMAL

## 2018-12-06 ENCOUNTER — TELEPHONE (OUTPATIENT)
Dept: UROLOGY | Facility: CLINIC | Age: 78
End: 2018-12-06

## 2018-12-06 NOTE — TELEPHONE ENCOUNTER
Health Call Center    Phone Message    May a detailed message be left on voicemail: yes    Reason for Call: Other: After completing Cysto on 11.26.18.  Dr. Card indicated that [facility] would be contacted to set up his BCG infusion.  Please follow up with [facility].      Action Taken: Message routed to:  [facility]

## 2018-12-12 DIAGNOSIS — C67.8 MALIGNANT NEOPLASM OF OVERLAPPING SITES OF BLADDER (H): Primary | ICD-10-CM

## 2018-12-12 RX ORDER — LIDOCAINE HYDROCHLORIDE 20 MG/ML
10 JELLY TOPICAL
Status: CANCELLED | OUTPATIENT
Start: 2019-06-18

## 2018-12-12 RX ORDER — LEVOFLOXACIN 500 MG/1
TABLET, FILM COATED ORAL
Qty: 6 TABLET | Refills: 6 | Status: SHIPPED | OUTPATIENT
Start: 2018-12-12 | End: 2020-09-22

## 2018-12-12 RX ORDER — LIDOCAINE HYDROCHLORIDE 20 MG/ML
10 JELLY TOPICAL
Status: CANCELLED | OUTPATIENT
Start: 2019-06-11

## 2018-12-12 RX ORDER — LIDOCAINE HYDROCHLORIDE 20 MG/ML
10 JELLY TOPICAL
Status: CANCELLED | OUTPATIENT
Start: 2019-06-25

## 2018-12-13 ENCOUNTER — INFUSION THERAPY VISIT (OUTPATIENT)
Dept: ONCOLOGY | Facility: CLINIC | Age: 78
End: 2018-12-13
Attending: UROLOGY
Payer: COMMERCIAL

## 2018-12-13 VITALS
DIASTOLIC BLOOD PRESSURE: 85 MMHG | TEMPERATURE: 97.6 F | SYSTOLIC BLOOD PRESSURE: 158 MMHG | OXYGEN SATURATION: 100 % | HEART RATE: 71 BPM | RESPIRATION RATE: 16 BRPM

## 2018-12-13 DIAGNOSIS — C67.8 MALIGNANT NEOPLASM OF OVERLAPPING SITES OF BLADDER (H): Primary | ICD-10-CM

## 2018-12-13 LAB
ALBUMIN UR-MCNC: NEGATIVE MG/DL
APPEARANCE UR: ABNORMAL
BILIRUB UR QL STRIP: NEGATIVE
COLOR UR AUTO: YELLOW
GLUCOSE UR STRIP-MCNC: NEGATIVE MG/DL
HGB UR QL STRIP: ABNORMAL
KETONES UR STRIP-MCNC: NEGATIVE MG/DL
LEUKOCYTE ESTERASE UR QL STRIP: ABNORMAL
NITRATE UR QL: NEGATIVE
PH UR STRIP: 6 PH (ref 5–7)
SOURCE: ABNORMAL
SP GR UR STRIP: 1.01 (ref 1–1.03)
UROBILINOGEN UR STRIP-MCNC: 0 MG/DL (ref 0–2)

## 2018-12-13 PROCEDURE — 81003 URINALYSIS AUTO W/O SCOPE: CPT | Performed by: UROLOGY

## 2018-12-13 PROCEDURE — 25000128 H RX IP 250 OP 636: Mod: ZF | Performed by: UROLOGY

## 2018-12-13 PROCEDURE — 25000125 ZZHC RX 250: Mod: ZF | Performed by: UROLOGY

## 2018-12-13 PROCEDURE — 51720 TREATMENT OF BLADDER LESION: CPT

## 2018-12-13 RX ADMIN — BACILLUS CALMETTE-GUERIN 50 MG: 50 POWDER, FOR SUSPENSION INTRAVESICAL at 08:50

## 2018-12-13 RX ADMIN — LIDOCAINE HYDROCHLORIDE 10 ML: 20 JELLY TOPICAL at 08:33

## 2018-12-13 ASSESSMENT — PAIN SCALES - GENERAL: PAINLEVEL: NO PAIN (0)

## 2018-12-13 NOTE — PROGRESS NOTES
Infusion Nursing Note:  Max Zuñiga presents today for BCG.  Instillation number 1 of 3.   Patient seen by provider today: No   present during visit today: Not Applicable.    Note: Reports feeling well.  Denies any urgency/burning or urinary frequency.  Does endorse noting that he has had cloudy urine since about a week post his last cystoscopy on 11/26.  He denies seeing any blood.  He believes he's drinking enough fluids or least his fluid intake has not changed.   His BP is a bit high today and he thinks this may be related to being worried as to whether or not he's able to receive his treatment today.   /93 upon arrival.  Recheck later was 158/85.  He has a BP monitor at home.  I asked him to check his BP's at home and if it remains elevated - follow up with his PCP.    His urine is slightly cloudy today.  I see no visible blood and see no tissue fragments.  The color is yellow.      He will  his Levaquin today at his local pharmacy.    Treatment Conditions:   Patient complains of the following: cloudy urine.  Urine meets parameters.    Labs Reviewed:  Urine analysis.  Results meet treatment conditions.     Procedure:  14F red blackburn catheter placed.   Lidocaine urojet 2% 10ml used.  Catheter placed without trauma.  Only slightly cloudy/yellow urine drained from bladder.  Nearly filled the entire straight cath container when draining his bladder.  Patient turned every 15 minutes per protocol: Yes, turning to be completed at home per protocol.   Patient tolerated instillation without incident.      Discharge Plan:   Prescription refills given for Levaquin was sent yesterday to his local pharmacy.  AVS to patient via [employer].  Patient will return 12/20/18 for next appointment.   Patient discharged in stable condition accompanied by: self.  Departure Mode: Ambulatory.  Face to Face time: 0.    Tamera Chowdhury RN

## 2018-12-13 NOTE — PATIENT INSTRUCTIONS
Home discharge instructions:  -To make sure each treatment has the best chance of working, follow these steps 2 hours after each treatment   1. Lie on your back for 15 minues   2. Lie on your left side for 15 mintues   3. Lie on your right side for 15 minutes   4. Get up but keep the medication  In your bladder for 60 more minutes- for a total of 2 hours   5. Empty your bladder following the directions below (if you have difficulty holding your bladder it is ok to empty your bladder early)  -Wear pad if incontinence is a possibliity  -Wash hands throughly after using the bathroom  -For safety reasons remember to follow these directions for 6 hours after each treatment:   1. Sit on the toilet seat to urinate   2. Immediately after urinating- add 2 cups of undiluted chlorine bleach to the toilet    3. Close lid and let the bleach sit for 15 minutes each time   4. Drink plenty of water to flush any remaining medication out of your bladder    **These steps will help kill all the BCG bacteria and disinfect the toilet**    Please void BCG at 1100    Take your antibiotic today at 1500 pm and tomorrow morning.      Please call urology triage line at [phone] or [phone] with fevers or chills, burning with urination, or blood in your urine which lasts more than a 48-72 hours or with any question or concerns.      December 2018 Sunday Monday Tuesday Wednesday Thursday Friday Saturday                                 1       2     3     4     5     6     7     8       9     10     11     12     13    [facility] ONC INFUSION 120   8:00 AM   (120 min.)    ONCOLOGY INFUSION   [facility] 14     15       16     17     18     19     20    [facility] ONC INFUSION 120   8:00 AM   (120 min.)    ONCOLOGY INFUSION   [facility] 21     22       23     24     25     26     27    [facility] ONC INFUSION 120   8:00 AM   (120 min.)    ONCOLOGY INFUSION   [facility] 28     29       30      31 January 2019 Sunday Monday Tuesday Wednesday Thursday Friday Saturday             1     2     3     4     5       6     7     8     9     10     11     12       13     14     15     16     17     18     19       20     21     22     23     24     25     26       27     28     29     30     31

## 2018-12-20 ENCOUNTER — INFUSION THERAPY VISIT (OUTPATIENT)
Dept: ONCOLOGY | Facility: CLINIC | Age: 78
End: 2018-12-20
Attending: UROLOGY
Payer: COMMERCIAL

## 2018-12-20 VITALS
HEART RATE: 75 BPM | DIASTOLIC BLOOD PRESSURE: 76 MMHG | RESPIRATION RATE: 18 BRPM | SYSTOLIC BLOOD PRESSURE: 151 MMHG | TEMPERATURE: 97.9 F | BODY MASS INDEX: 24.97 KG/M2 | OXYGEN SATURATION: 97 % | WEIGHT: 169.1 LBS

## 2018-12-20 DIAGNOSIS — C67.8 MALIGNANT NEOPLASM OF OVERLAPPING SITES OF BLADDER (H): Primary | ICD-10-CM

## 2018-12-20 PROCEDURE — 25000128 H RX IP 250 OP 636: Mod: ZF | Performed by: UROLOGY

## 2018-12-20 PROCEDURE — 81003 URINALYSIS AUTO W/O SCOPE: CPT | Performed by: UROLOGY

## 2018-12-20 PROCEDURE — 51720 TREATMENT OF BLADDER LESION: CPT

## 2018-12-20 PROCEDURE — 25000125 ZZHC RX 250: Mod: ZF | Performed by: UROLOGY

## 2018-12-20 RX ADMIN — BACILLUS CALMETTE-GUERIN 50 MG: 50 POWDER, FOR SUSPENSION INTRAVESICAL at 09:11

## 2018-12-20 RX ADMIN — LIDOCAINE HYDROCHLORIDE 10 ML: 20 JELLY TOPICAL at 09:03

## 2018-12-20 NOTE — PROGRESS NOTES
Infusion Nursing Note:  Max Zuñiga presents today for BCG.  Instillation number 2 of 3.   Patient seen by provider today: No   present during visit today: Not Applicable.    Note: Celestino reports his last instillation of BCG went well overall. He experienced mild burning, frequency and chills the evening of the BCG. The symptoms resolved and he felt back to baseline in the morning. He tolerated retaining the BCG for 2 hours without difficulty. Patient verified taking his Levaquin as ordered and has enough for this cycle.     Treatment Conditions:   Patient states no concerns or issues at this time.  Ok to proceed with treatment.     Labs Reviewed:  Urine analysis.  Results meet treatment conditions.     Procedure:  14F Straight catheter placed.   Lidocaine urojet 2% 10ml used.  Catheter placed without trauma.  Clear/yellow urine drained from bladder.    Patient turned every 15 minutes per protocol: Yes, turning to be completed at home per protocol. Patient will retain BCG in bladder until 1130.    Discharge Plan:   Patient declined prescription refills.  Copy of AVS sent via My Chart.   Patient will return 12/27 for next appointment.  Patient discharged in stable condition accompanied by: self.  Departure Mode: Ambulatory.    Daysi Villalba RN

## 2018-12-20 NOTE — PATIENT INSTRUCTIONS
Home discharge instructions:  -To make sure each treatment has the best chance of working, follow these steps 2 hours after each treatment                        1. Lie on your back for 15 minues                        2. Lie on your left side for 15 mintues                        3. Lie on your right side for 15 minutes                        4. Get up but keep the medication  In your bladder for 60 more minutes- for a total of 2 hours                        5. Empty your bladder following the directions below (if you have difficulty holding your bladder it is ok to empty your bladder early)  -Wear pad if incontinence is a possibliity  -Wash hands throughly after using the bathroom  -For safety reasons remember to follow these directions for 6 hours after each treatment:                        1. Sit on the toilet seat to urinate                        2. Immediately after urinating- add 2 cups of undiluted chlorine bleach to the toilet                         3. Close lid and let the bleach sit for 15 minutes each time                        4. Drink plenty of water to flush any remaining medication out of your bladder     **These steps will help kill all the BCG bacteria and disinfect the toilet**     Please void BCG at 1130     Take your antibiotic today at 1530 and tomorrow morning.        Please call urology triage line at [phone] or [phone] with fevers or chills, burning with urination, or blood in your urine which lasts more than a 48-72 hours or with any question or concerns.      December 2018 Sunday Monday Tuesday Wednesday Thursday Friday Saturday                                 1       2     3     4     5     6     7     8       9     10     11     12     13    [facility] ONC INFUSION 120   8:00 AM   (120 min.)    ONCOLOGY INFUSION   [facility] 14     15       16     17     18     19     20    [facility] ONC INFUSION 120   8:00 AM   (120 min.)    ONCOLOGY INFUSION   [facility] 21     22       23     24     25     26     27    [facility] ONC INFUSION 120   8:00 AM   (120 min.)    ONCOLOGY INFUSION   [facility] 28 29 30 31 January 2019 Sunday Monday Tuesday Wednesday Thursday Friday Saturday             1     2     3     4     5       6     7     8     9     10     11     12       13     14     15     16     17     18     19       20     21     22     23     24     25     26       27     28     29     30     31                               Lab Results:  Recent Results (from the past 12 hour(s))   UA without Microscopic    Collection Time: 12/20/18  8:10 AM   Result Value Ref Range    Color Urine Yellow     Appearance Urine Clear     Glucose Urine Negative NEG^Negative mg/dL    Bilirubin Urine Negative NEG^Negative    Ketones Urine Negative NEG^Negative mg/dL    Specific Gravity Urine 1.011 1.003 - 1.035    Blood Urine Negative NEG^Negative    pH Urine 6.0 5.0 - 7.0 pH    Protein Albumin Urine Negative NEG^Negative mg/dL    Urobilinogen mg/dL 0.0 0.0 - 2.0 mg/dL    Nitrite Urine Negative NEG^Negative    Leukocyte Esterase Urine Negative NEG^Negative    Source Midstream Urine

## 2018-12-27 ENCOUNTER — INFUSION THERAPY VISIT (OUTPATIENT)
Dept: ONCOLOGY | Facility: CLINIC | Age: 78
End: 2018-12-27
Attending: UROLOGY
Payer: COMMERCIAL

## 2018-12-27 VITALS
HEART RATE: 80 BPM | DIASTOLIC BLOOD PRESSURE: 80 MMHG | SYSTOLIC BLOOD PRESSURE: 157 MMHG | TEMPERATURE: 97.9 F | RESPIRATION RATE: 16 BRPM | OXYGEN SATURATION: 97 %

## 2018-12-27 DIAGNOSIS — C67.8 MALIGNANT NEOPLASM OF OVERLAPPING SITES OF BLADDER (H): Primary | ICD-10-CM

## 2018-12-27 LAB
ALBUMIN UR-MCNC: NEGATIVE MG/DL
APPEARANCE UR: CLEAR
BILIRUB UR QL STRIP: NEGATIVE
COLOR UR AUTO: YELLOW
GLUCOSE UR STRIP-MCNC: NEGATIVE MG/DL
HGB UR QL STRIP: NEGATIVE
KETONES UR STRIP-MCNC: NEGATIVE MG/DL
LEUKOCYTE ESTERASE UR QL STRIP: ABNORMAL
NITRATE UR QL: NEGATIVE
PH UR STRIP: 6 PH (ref 5–7)
SOURCE: ABNORMAL
SP GR UR STRIP: 1.01 (ref 1–1.03)
UROBILINOGEN UR STRIP-MCNC: 0 MG/DL (ref 0–2)

## 2018-12-27 PROCEDURE — 25000128 H RX IP 250 OP 636: Mod: ZF | Performed by: UROLOGY

## 2018-12-27 PROCEDURE — 51720 TREATMENT OF BLADDER LESION: CPT

## 2018-12-27 PROCEDURE — 25000125 ZZHC RX 250: Mod: ZF | Performed by: UROLOGY

## 2018-12-27 PROCEDURE — 88112 CYTOPATH CELL ENHANCE TECH: CPT | Performed by: UROLOGY

## 2018-12-27 PROCEDURE — 88120 CYTP URNE 3-5 PROBES EA SPEC: CPT | Performed by: UROLOGY

## 2018-12-27 PROCEDURE — 81003 URINALYSIS AUTO W/O SCOPE: CPT | Performed by: UROLOGY

## 2018-12-27 RX ADMIN — LIDOCAINE HYDROCHLORIDE 10 ML: 20 JELLY TOPICAL at 08:32

## 2018-12-27 RX ADMIN — BACILLUS CALMETTE-GUERIN 50 MG: 50 POWDER, FOR SUSPENSION INTRAVESICAL at 09:27

## 2018-12-27 ASSESSMENT — PAIN SCALES - GENERAL: PAINLEVEL: NO PAIN (0)

## 2018-12-27 NOTE — PATIENT INSTRUCTIONS
[facility] Main Line: [phone]    Call triage nurse with chills and/or temperature greater than or equal to 100.4, uncontrolled nausea/vomiting, diarrhea, constipation, dizziness, shortness of breath, chest pain, bleeding, unexplained bruising, or any new/concerning symptoms, questions/concerns.     If you are having any concerning symptoms or wish to speak to a provider before your next infusion visit, please call your care coordinator or triage to notify them so we can adequately serve you.     For triage nurse, after hours, weekends, and holidays: [phone]

## 2018-12-27 NOTE — PROGRESS NOTES
Infusion Nursing Note:  Max Zuñiga presents today for BCG maintenance treatment 3 of 3.    Patient seen by provider today: No     Note: Celestino reports to infusion today feeling well. Following his treatment last week, he was able to hold the treatment for the full two hours. He reports mild chills and dysuria in the evening following treatment, which resolved before the next morning. He denies increased urgency or frequency, hematuria, or fever. Patient offers no new complaints at this time.     There was no visible blood in today's urine sample.      Treatment Conditions:  UA RESULTS:  Lab Test 12/27/18  0800   COLOR Yellow   APPEARANCE Clear   URINEGLC Negative   URINEBILI Negative   URINEKETONE Negative   SG 1.011   UBLD Negative   URINEPH 6.0   PROTEIN Negative   NITRITE Negative   LEUKEST Trace*   RBCU  --    WBCU  --       Pre-procedure Assessment:  Dysuria: No  Hematuria: No  Fever/chills: No  Increased urinary urgency: No  Increased urinary frequency: No     Lidocaine urojet 2% 10 ml used: Yes  Catheter placed using sterile technique: Yes, 14 Fr red rubber blackburn. Clear, yellow urine drained from the bladder without incident.     Post-procedure Assessment:  Patient tolerated BCG instillation into the bladder without incident..     Patient instructed on the following and verbalized understanding:   Medication to remain in the bladder for 2 hours post instillation: YES  Post instillation side effects and precautions discussed: YES     Discharge Plan:   Patient declined prescription refills.  Patient and/or family verbalized understanding of discharge instructions and all questions answered.  Copy of AVS reviewed with patient and/or family.  Patient will return 6/3/2019 for next appointment.  Patient discharged in stable condition.  Departure Mode: Ambulatory.    Ephraim Caldwell RN

## 2019-06-03 ENCOUNTER — ANCILLARY PROCEDURE (OUTPATIENT)
Dept: CT IMAGING | Facility: CLINIC | Age: 79
End: 2019-06-03
Attending: UROLOGY
Payer: COMMERCIAL

## 2019-06-03 DIAGNOSIS — C67.3 MALIGNANT NEOPLASM OF ANTERIOR WALL OF URINARY BLADDER (H): ICD-10-CM

## 2019-06-03 DIAGNOSIS — C67.9 BLADDER CANCER (H): ICD-10-CM

## 2019-06-03 DIAGNOSIS — C67.9 BLADDER CANCER (H): Primary | ICD-10-CM

## 2019-06-03 RX ORDER — IOPAMIDOL 755 MG/ML
104 INJECTION, SOLUTION INTRAVASCULAR ONCE
Status: COMPLETED | OUTPATIENT
Start: 2019-06-03 | End: 2019-06-03

## 2019-06-03 RX ADMIN — IOPAMIDOL 104 ML: 755 INJECTION, SOLUTION INTRAVASCULAR at 12:15

## 2019-06-03 NOTE — DISCHARGE INSTRUCTIONS

## 2019-06-07 DIAGNOSIS — C67.9 BLADDER CANCER (H): Primary | ICD-10-CM

## 2019-06-10 ENCOUNTER — OFFICE VISIT (OUTPATIENT)
Dept: UROLOGY | Facility: CLINIC | Age: 79
End: 2019-06-10
Payer: COMMERCIAL

## 2019-06-10 ENCOUNTER — PRE VISIT (OUTPATIENT)
Dept: UROLOGY | Facility: CLINIC | Age: 79
End: 2019-06-10

## 2019-06-10 VITALS
HEIGHT: 69 IN | BODY MASS INDEX: 24.6 KG/M2 | DIASTOLIC BLOOD PRESSURE: 89 MMHG | WEIGHT: 166.1 LBS | SYSTOLIC BLOOD PRESSURE: 152 MMHG | HEART RATE: 60 BPM

## 2019-06-10 DIAGNOSIS — C67.9 MALIGNANT NEOPLASM OF URINARY BLADDER, UNSPECIFIED SITE (H): Primary | ICD-10-CM

## 2019-06-10 RX ORDER — LIDOCAINE HYDROCHLORIDE 20 MG/ML
10 JELLY TOPICAL ONCE
Status: COMPLETED | OUTPATIENT
Start: 2019-06-10 | End: 2019-06-10

## 2019-06-10 RX ORDER — 1.1% SODIUM FLUORIDE PRESCRIPTION DENTAL CREAM 5 MG/G
CREAM DENTAL
Refills: 0 | COMMUNITY
Start: 2018-12-05

## 2019-06-10 RX ADMIN — LIDOCAINE HYDROCHLORIDE 10 ML: 20 JELLY TOPICAL at 12:49

## 2019-06-10 ASSESSMENT — MIFFLIN-ST. JEOR: SCORE: 1468.8

## 2019-06-10 ASSESSMENT — PAIN SCALES - GENERAL: PAINLEVEL: NO PAIN (0)

## 2019-06-10 NOTE — PATIENT INSTRUCTIONS
"  AFTER YOUR CYSTOSCOPY        You have just completed a cystoscopy, or \"cysto\", which allowed your physician to learn more about your bladder (or to remove a stent placed after surgery). We suggest that you continue to avoid caffeine, fruit juice, and alcohol for the next 24 hours, however, you are encouraged to return to your normal activities.         A few things that are considered normal after your cystoscopy:     * Small amount of bleeding (or spotting) that clears within the next 24 hours     * Slight burning sensation with urination     * Sensation to of needing to avoid more frequently     * The feeling of \"air\" in your urine     * Mild discomfort that is relieved with Tylenol        Please contact our office promptly if you:     * Develop a fever above 101 degrees     * Are unable to urinate     * Develop bright red blood that does not stop     * Severe pain or swelling           Please schedule a Cystoscopy in 6 months.    It was a pleasure meeting with you today.  Thank you for allowing me and my team the privilege of caring for you today.  YOU are the reason we are here, and I truly hope we provided you with the excellent service you deserve.  Please let us know if there is anything else we can do for you so that we can be sure you are leaving completely satisfied with your care experience.        Kirsty Agarwal LPN    .  Please contact our office with any concerns or questions @[email or web address].  "

## 2019-06-10 NOTE — LETTER
6/10/2019       RE: Max Zuñiga  [address]  [facility] [address]     Dear Colleague,    Thank you for referring your patient, Max Zuñiga, to the [facility] UROLOG[facility] AND [facility] FOR PROSTATE AND UROLOGIC CANCERS at [facility]. Please see a copy of my visit note below.    PRE-PROCEDURE DIAGNOSIS: high grade pT1 bladder ca  POST-PROCEDURE DIAGNOSIS: same  PROCEDURE: Cystoscopy    HISTORY: Max Zuñiga is a 78 year old male with a history of high grade pT1 bladder cancer. He completed one course of induction BCG and his last round of maintenance BCG was     Pathology: high grade pT1 (11/15/17)  Most recent resection: 17: no evidence of malignancy  Last surveillance cysto: 18  Last upper tract imagin/3/19 (no evidence of urinary tract malignancy or upper tract disease)  Last cytology:  (negative)    DESCRIPTION OF PROCEDURE: After informed consent was obtained, the patient was brought to the procedure room where he was placed in the supine position with all pressure points well padded.   The penis and scrotum were prepped and draped in a sterile fashion. A flexible cystoscope was introduced through a well-lubricated urethra. Anterior urethra strictures were absent.   The urinary sphincter was intact.  The prostate demonstrated  moderate hypertrophy.  Bladder signififcant for the following:      Diverticuli: present numerous small      Cellules: absent      Trabeculation: present throughout      Tumors: absent      Stones: absent  The flexible cystoscope was removed and the findings were described to the patient.     ASSESSMENT AND PLAN: 78 year old male with bladder cancer. Will plan for another cycle of BCG. Will plan to see in 6 months for next surveillance cysto and can then switch to yearly if that cysto is normal.   Patient had a significant amount of urine in his bladder today during cystoscopy after recently voiding to give a urine sample  and I discussed a workup or medication for BPH, but the patient was not interested as he does not have bothersome LUTS.      Again, thank you for allowing me to participate in the care of your patient.      Sincerely,    Feng Card MD

## 2019-06-10 NOTE — NURSING NOTE
Chief Complaint   Patient presents with     Follow Up     Bladder cancer F/U , review imaging   Invasive Procedure Safety Checklist:    Procedure: Cystoscopy    Action: Complete sections and checkboxes as appropriate.    Pre-procedure:  1. Patient ID Verified with 2 identifiers (Arely and  or MRN) : YES    2. Procedure and site verified with patient/designee (when able) : YES    3. Accurate consent documentation in medical record : YES    4. H&P (or appropriate assessment) documented in medical record : YES  H&P must be up to 30 days prior to procedure an updated within 24 hours of                 Procedure as applicable.     5. Relevant diagnostic and radiology test results appropriately labeled and displayed as applicable : YES    6. Blood products, implants, devices, and/or special equipment available for the procedure as applicable : YES    7. Procedure site(s) marked with provider initials [Exclusions: N/A] : YES    8. Marking not required. Reason : Yes  Procedure does not require site marking    Time Out:     Time-Out performed immediately prior to starting procedure, including verbal and active participation of all team members addressing: YES    1. Correct patient identity.  2. Confirmed that the correct side and site are marked.  3. An accurate procedure to be done.  4. Agreement on the procedure to be done.  5. Correct patient position.  6. Relevant images and results are properly labeled and appropriately displayed.  7. The need to administer antibiotics or fluids for irrigation purposes during the procedure as applicable.  8. Safety precautions based on patient history or medication use.    During Procedure: Verification of correct person, site, and procedure occurs any time the responsibility for care of the patient is transferred to another member of the care team.  The following medication was given:     MEDICATION:  Lidocaine Jelly  ROUTE: Urethral  SITE: Urethral  DOSE: 10ml  LOT #:  [id number]  : Sean ROSAtd  EXPIRATION DATE: 02-21  NDC#: [address]-3   Was there drug waste? No    Prior to administration, verified patient identity using patient's name and date of birth.      Drug Amount Wasted:  None.  Vial/Syringe: Single dose vial    Kirsty Agarwal LPN  Rubia 10, 2019      Blood pressure 152/89, pulse 60, height 1.753 m (5' 9\"), weight 75.3 kg (166 lb 1.6 oz). Body mass index is 24.53 kg/m .    Patient Active Problem List   Diagnosis     Malignant neoplasm of overlapping sites of bladder (H)       No Known Allergies    Current Outpatient Medications   Medication Sig Dispense Refill     colchicine 0.6 MG tablet 1.2 mg at the first sign of flare, followed in 1 hour with a single dose of 0.6 mg (maximum: 1.8 mg within 1 hour)       glucosamine 500 MG CAPS Take 2 capsules by mouth every morning        indomethacin (INDOCIN) 25 MG capsule Take 25 mg by mouth       MAGNESIUM OXIDE PO Take 1 tablet by mouth daily        Multiple vitamin TABS Take 1 tablet by mouth every morning        OMEPRAZOLE PO Take 20 mg by mouth daily       SF 5000 PLUS 1.1 % CREA PLACE PEA SIZED AMOUNT ON TOOTHBRUSH. U 2 TIMES  PER DAY IN PLACE OF REGULAR PASTE.  0     levofloxacin (LEVAQUIN) 500 MG tablet Take 1 tablet 6 hours post treatment and 1 tablet each AM following each treatment. (Patient not taking: Reported on 6/10/2019) 6 tablet 6     sildenafil (VIAGRA) 100 MG tablet Take 0.5-1 Tabs by mouth. Take it one hour before sexual activity.         Social History     Tobacco Use     Smoking status: Former Smoker     Packs/day: 2.00     Years: 20.00     Pack years: 40.00     Types: Cigarettes     Smokeless tobacco: Never Used     Tobacco comment: quit in 1979   Substance Use Topics     Alcohol use: Yes     Comment: 2 beers daily     Drug use: No       Kirsty Agarwal LPN  6/10/2019  12:45 PM     "

## 2019-06-10 NOTE — NURSING NOTE
"Chief Complaint   Patient presents with     Follow Up     Bladder cancer F/U , review imaging       Height 1.753 m (5' 9\"), weight 75.3 kg (166 lb 1.6 oz). Body mass index is 24.53 kg/m .    Patient Active Problem List   Diagnosis     Malignant neoplasm of overlapping sites of bladder (H)       No Known Allergies    Current Outpatient Medications   Medication Sig Dispense Refill     colchicine 0.6 MG tablet 1.2 mg at the first sign of flare, followed in 1 hour with a single dose of 0.6 mg (maximum: 1.8 mg within 1 hour)       glucosamine 500 MG CAPS Take 2 capsules by mouth every morning        indomethacin (INDOCIN) 25 MG capsule Take 25 mg by mouth       MAGNESIUM OXIDE PO Take 1 tablet by mouth daily        Multiple vitamin TABS Take 1 tablet by mouth every morning        OMEPRAZOLE PO Take 20 mg by mouth daily       SF 5000 PLUS 1.1 % CREA PLACE PEA SIZED AMOUNT ON TOOTHBRUSH. U 2 TIMES  PER DAY IN PLACE OF REGULAR PASTE.  0     levofloxacin (LEVAQUIN) 500 MG tablet Take 1 tablet 6 hours post treatment and 1 tablet each AM following each treatment. (Patient not taking: Reported on 6/10/2019) 6 tablet 6     sildenafil (VIAGRA) 100 MG tablet Take 0.5-1 Tabs by mouth. Take it one hour before sexual activity.         Social History     Tobacco Use     Smoking status: Former Smoker     Packs/day: 2.00     Years: 20.00     Pack years: 40.00     Types: Cigarettes     Smokeless tobacco: Never Used     Tobacco comment: quit in 1979   Substance Use Topics     Alcohol use: Yes     Comment: 2 beers daily     Drug use: No       Kirsty Agarwal LPN  6/10/2019  12:19 PM     "

## 2019-06-10 NOTE — PROGRESS NOTES
PRE-PROCEDURE DIAGNOSIS: high grade pT1 bladder ca  POST-PROCEDURE DIAGNOSIS: same  PROCEDURE: Cystoscopy    HISTORY: Max Zuñiga is a 78 year old male with a history of high grade pT1 bladder cancer. He completed one course of induction BCG and his last round of maintenance BCG was     Pathology: high grade pT1 (11/15/17)  Most recent resection: 17: no evidence of malignancy  Last surveillance cysto: 18  Last upper tract imagin/3/19 (no evidence of urinary tract malignancy or upper tract disease)  Last cytology:  (negative)    DESCRIPTION OF PROCEDURE: After informed consent was obtained, the patient was brought to the procedure room where he was placed in the supine position with all pressure points well padded.   The penis and scrotum were prepped and draped in a sterile fashion. A flexible cystoscope was introduced through a well-lubricated urethra. Anterior urethra strictures were absent.   The urinary sphincter was intact.  The prostate demonstrated moderate hypertrophy.  Bladder signififcant for the following:      Diverticuli: present numerous small      Cellules: absent      Trabeculation: present throughout      Tumors: absent      Stones: absent  The flexible cystoscope was removed and the findings were described to the patient.     ASSESSMENT AND PLAN: 78 year old male with bladder cancer. Will plan for another cycle of BCG. Will plan to see in 6 months for next surveillance cysto and can then switch to yearly if that cysto is normal.   Patient had a significant amount of urine in his bladder today during cystoscopy after recently voiding to give a urine sample and I discussed a workup or medication for BPH, but the patient was not interested as he does not have bothersome LUTS.

## 2019-06-13 LAB — COPATH REPORT: NORMAL

## 2019-06-20 LAB — COPATH REPORT: NORMAL

## 2019-07-15 ENCOUNTER — MYC MEDICAL ADVICE (OUTPATIENT)
Dept: UROLOGY | Facility: CLINIC | Age: 79
End: 2019-07-15

## 2019-07-15 DIAGNOSIS — N39.0 URINARY TRACT INFECTION WITHOUT HEMATURIA, SITE UNSPECIFIED: ICD-10-CM

## 2019-07-15 DIAGNOSIS — N39.0 URINARY TRACT INFECTION WITHOUT HEMATURIA, SITE UNSPECIFIED: Primary | ICD-10-CM

## 2019-07-15 LAB
ALBUMIN UR-MCNC: 30 MG/DL
APPEARANCE UR: ABNORMAL
BILIRUB UR QL STRIP: NEGATIVE
CAOX CRY #/AREA URNS HPF: ABNORMAL /HPF
COLOR UR AUTO: YELLOW
GLUCOSE UR STRIP-MCNC: NEGATIVE MG/DL
HGB UR QL STRIP: ABNORMAL
KETONES UR STRIP-MCNC: NEGATIVE MG/DL
LEUKOCYTE ESTERASE UR QL STRIP: ABNORMAL
NITRATE UR QL: NEGATIVE
PH UR STRIP: 6 PH (ref 5–7)
RBC #/AREA URNS AUTO: 11 /HPF (ref 0–2)
SOURCE: ABNORMAL
SP GR UR STRIP: 1.01 (ref 1–1.03)
UROBILINOGEN UR STRIP-MCNC: 0 MG/DL (ref 0–2)
WBC #/AREA URNS AUTO: >182 /HPF (ref 0–5)
WBC CLUMPS #/AREA URNS HPF: PRESENT /HPF
YEAST #/AREA URNS HPF: ABNORMAL /HPF

## 2019-07-17 LAB
BACTERIA SPEC CULT: ABNORMAL
Lab: ABNORMAL
SPECIMEN SOURCE: ABNORMAL

## 2019-07-17 RX ORDER — NITROFURANTOIN 25; 75 MG/1; MG/1
100 CAPSULE ORAL 2 TIMES DAILY
Qty: 10 CAPSULE | Refills: 0 | Status: SHIPPED | OUTPATIENT
Start: 2019-07-17 | End: 2019-07-22

## 2019-07-17 NOTE — PROGRESS NOTES
Patient notified via my chart that his UC is positive. His treatment plan will be Macrobid 100 mg take one tablet twice daily for 5 days. My chart message sent asking patient to call with his preferred pharmacy.      Vikram Draper MA

## 2019-08-13 ENCOUNTER — TELEPHONE (OUTPATIENT)
Dept: UROLOGY | Facility: CLINIC | Age: 79
End: 2019-08-13

## 2019-09-30 ENCOUNTER — HEALTH MAINTENANCE LETTER (OUTPATIENT)
Age: 79
End: 2019-09-30

## 2019-11-25 PROBLEM — M54.50 LOW BACK PAIN WITH RADIATION: Status: ACTIVE | Noted: 2017-11-14

## 2019-11-25 PROBLEM — Z85.828 PERSONAL HISTORY OF OTHER MALIGNANT NEOPLASM OF SKIN: Status: ACTIVE | Noted: 2018-04-17

## 2019-11-27 ENCOUNTER — PRE VISIT (OUTPATIENT)
Dept: UROLOGY | Facility: CLINIC | Age: 79
End: 2019-11-27

## 2019-11-27 NOTE — TELEPHONE ENCOUNTER
Reason for Visit: Cystoscopy, previously seen by Dr. Card    Diagnosis: Hx of bladder cancer    Orders/Procedures/Records: Records available    Contact Patient: N/A    Rooming Requirements: Cystoscopy, collect urine      Norma Tijerina  11/27/19  11:23 AM

## 2019-12-15 ENCOUNTER — HEALTH MAINTENANCE LETTER (OUTPATIENT)
Age: 79
End: 2019-12-15

## 2019-12-15 NOTE — PROGRESS NOTES
PRE-PROCEDURE DIAGNOSIS: high grade pT1 bladder ca    POST-PROCEDURE DIAGNOSIS: same    PROCEDURE: Cystoscopy     HISTORY: Max Zuñiga is a 79 year old male with a history of high grade pT1 bladder cancer.   He completed one course of induction BCG and his last round of maintenance BCG was      Pathology: high grade pT1 (11/15/17)  Most recent resection: 17: no evidence of malignancy  Last surveillance cysto: 2019  Last upper tract imagin2019 (no evidence of urinary tract malignancy or upper tract disease)  Last cytology:  (negative)     DESCRIPTION OF PROCEDURE: After informed consent was obtained, the patient was brought to the procedure room where he was placed in the supine position with all pressure points well padded.   The penis and scrotum were prepped and draped in a sterile fashion. A flexible cystoscope was introduced through a well-lubricated urethra. Anterior urethra strictures were absent.   The urinary sphincter was intact.  The prostate demonstrated moderate hypertrophy.  Bladder significant for the following:      Diverticuli: present numerous small      Trabeculation: present throughout      Tumors: absent      Stones: absent  The flexible cystoscope was removed and the findings were described to the patient.      ASSESSMENT AND PLAN:   79year old male with history of high risk bladder cancer.     According to the NCCN guidelines he should have cystoscopy every 6 months until 5 years (currently 2 years from treatment)  He should have upper tract imaging every 1-2 years for 10 years (this is up to date)  Cytology every 6 months for 5 years

## 2019-12-16 ENCOUNTER — OFFICE VISIT (OUTPATIENT)
Dept: UROLOGY | Facility: CLINIC | Age: 79
End: 2019-12-16
Payer: COMMERCIAL

## 2019-12-16 VITALS
DIASTOLIC BLOOD PRESSURE: 72 MMHG | BODY MASS INDEX: 24.59 KG/M2 | SYSTOLIC BLOOD PRESSURE: 141 MMHG | HEART RATE: 79 BPM | WEIGHT: 166 LBS | HEIGHT: 69 IN

## 2019-12-16 DIAGNOSIS — C67.9 MALIGNANT NEOPLASM OF URINARY BLADDER, UNSPECIFIED SITE (H): Primary | ICD-10-CM

## 2019-12-16 ASSESSMENT — MIFFLIN-ST. JEOR: SCORE: 1463.35

## 2019-12-16 ASSESSMENT — PAIN SCALES - GENERAL: PAINLEVEL: NO PAIN (0)

## 2019-12-16 NOTE — PATIENT INSTRUCTIONS
"Please schedule a cystoscopy with Dr. Fields in 6 months.      It was a pleasure meeting with you today.  Thank you for allowing me and my team the privilege of caring for you today.  YOU are the reason we are here, and I truly hope we provided you with the excellent service you deserve.  Please let us know if there is anything else we can do for you so that we can be sure you are leaving completely satisfied with your care experience.          AFTER YOUR CYSTOSCOPY        You have just completed a cystoscopy, or \"cysto\", which allowed your physician to learn more about your bladder (or to remove a stent placed after surgery). We suggest that you continue to avoid caffeine, fruit juice, and alcohol for the next 24 hours, however, you are encouraged to return to your normal activities.         A few things that are considered normal after your cystoscopy:     * Small amount of bleeding (or spotting) that clears within the next 24 hours     * Slight burning sensation with urination     * Sensation to of needing to avoid more frequently     * The feeling of \"air\" in your urine     * Mild discomfort that is relieved with Tylenol        Please contact our office promptly if you:     * Develop a fever above 101 degrees     * Are unable to urinate     * Develop bright red blood that does not stop     * Severe pain or swelling         Please contact our office with any concerns or questions @[email or web address].  "

## 2019-12-16 NOTE — NURSING NOTE
"Chief Complaint   Patient presents with     Cystoscopy     Hx of bladder cancer       Blood pressure (!) 141/72, pulse 79, height 1.753 m (5' 9\"), weight 75.3 kg (166 lb). Body mass index is 24.51 kg/m .    Patient Active Problem List   Diagnosis     Malignant neoplasm of overlapping sites of bladder (H)     Chondromalacia of patella     Hammer toes of both feet     Impulse control disorder     Internal derangement of knee     Laryngopharyngeal reflux disease     Lattice degeneration of peripheral retina     Low back pain with radiation     Personal history of other malignant neoplasm of skin     Pseudophakia of left eye     Secondary localized osteoarthrosis, lower leg     Senile nuclear sclerosis     Right inguinal hernia     Sensorineural hearing loss       No Known Allergies    Current Outpatient Medications   Medication Sig Dispense Refill     colchicine 0.6 MG tablet 1.2 mg at the first sign of flare, followed in 1 hour with a single dose of 0.6 mg (maximum: 1.8 mg within 1 hour)       glucosamine 500 MG CAPS Take 2 capsules by mouth every morning        indomethacin (INDOCIN) 25 MG capsule Take 25 mg by mouth       MAGNESIUM OXIDE PO Take 1 tablet by mouth daily        Multiple vitamin TABS Take 1 tablet by mouth every morning        OMEPRAZOLE PO Take 20 mg by mouth daily       SF 5000 PLUS 1.1 % CREA PLACE PEA SIZED AMOUNT ON TOOTHBRUSH. U 2 TIMES  PER DAY IN PLACE OF REGULAR PASTE.  0     levofloxacin (LEVAQUIN) 500 MG tablet Take 1 tablet 6 hours post treatment and 1 tablet each AM following each treatment. (Patient not taking: Reported on 6/10/2019) 6 tablet 6     sildenafil (VIAGRA) 100 MG tablet Take 0.5-1 Tabs by mouth. Take it one hour before sexual activity.         Social History     Tobacco Use     Smoking status: Former Smoker     Packs/day: 2.00     Years: 20.00     Pack years: 40.00     Types: Cigarettes     Smokeless tobacco: Never Used     Tobacco comment: quit in 1979   Substance Use Topics "     Alcohol use: Yes     Comment: 2 beers daily     Drug use: No       Invasive Procedure Safety Checklist:    Procedure: Cystoscopy    Action: Complete sections and checkboxes as appropriate.    Pre-procedure:  1. Patient ID Verified with 2 identifiers (Arely and  or MRN) : YES    2. Procedure and site verified with patient/designee (when able) : YES    3. Accurate consent documentation in medical record : YES    4. H&P (or appropriate assessment) documented in medical record : N/A  H&P must be up to 30 days prior to procedure an updated within 24 hours of                 Procedure as applicable.     5. Relevant diagnostic and radiology test results appropriately labeled and displayed as applicable : YES    6. Blood products, implants, devices, and/or special equipment available for the procedure as applicable : YES    7. Procedure site(s) marked with provider initials [Exclusions: none] : NO    8. Marking not required. Reason : Yes  Procedure does not require site marking    Time Out:     Time-Out performed immediately prior to starting procedure, including verbal and active participation of all team members addressing: YES    1. Correct patient identity.  2. Confirmed that the correct side and site are marked.  3. An accurate procedure to be done.  4. Agreement on the procedure to be done.  5. Correct patient position.  6. Relevant images and results are properly labeled and appropriately displayed.  7. The need to administer antibiotics or fluids for irrigation purposes during the procedure as applicable.  8. Safety precautions based on patient history or medication use.    During Procedure: Verification of correct person, site, and procedure occurs any time the responsibility for care of the patient is transferred to another member of the care team.    The following medication was given:     MEDICATION:  Lidocaine without epinephrine 2% jelly  ROUTE: Urethral  SITE: Urethra via the meatus  DOSE: 10 mL  LOT #:  [id number]  : [employer]  EXPIRATION DATE: 8-2021  NDC#: [address]-49   Was there drug waste? No    Prior to med admin, verified patient identity using patient's name and date of birth.  Due to med administration, patient instructed to remain in clinic for 15 minutes  afterwards, and to report any adverse reaction to me immediately.    Drug Amount Wasted:  None.  Vial/Syringe: Syringe      Norma Tijerina  12/16/2019  1:49 PM

## 2019-12-16 NOTE — LETTER
2019       RE: Max Zuñiga  [address]  [facility] [address]     Dear Colleague,    Thank you for referring your patient, Max Zuñiga, to the [facility] UROLOGY AND [facility] FOR PROSTATE AND UROLOGIC CANCERS at [facility]. Please see a copy of my visit note below.    PRE-PROCEDURE DIAGNOSIS: high grade pT1 bladder ca    POST-PROCEDURE DIAGNOSIS: same    PROCEDURE: Cystoscopy     HISTORY: Max Zuñiga is a 79 year old male with a history of high grade pT1 bladder cancer.   He completed one course of induction BCG and his last round of maintenance BCG was      Pathology: high grade pT1 (11/15/17)  Most recent resection: 17: no evidence of malignancy  Last surveillance cysto:  2019  Last upper tract imagin2019 (no evidence of urinary tract malignancy or upper tract disease)  Last cytology:  (negative)     DESCRIPTION OF PROCEDURE: After informed consent was obtained, the patient was brought to the procedure room where he was placed in the supine position with all pressure points well padded.   The penis and scrotum were prepped and draped in a sterile fashion. A flexible cystoscope was introduced through a well-lubricated urethra. Anterior urethra strictures were absent.   The urinary sphincter was intact.  The prostate demonstrated moderate hypertrophy.  Bladder significant for the following:      Diverticuli: present numerous small      Trabeculation: present throughout      Tumors: absent      Stones: absent  The flexible cystoscope was removed and the findings were described to the patient.      ASSESSMENT AND PLAN:   79year old male with history of high risk bladder cancer.     According to the NCCN guidelines he should have cystoscopy every 6 months until 5 years (currently 2 years from treatment)  He should have upper tract imaging every 1-2 years for 10 years (this is up to date)  Cytology every 6 months for 5 years        Again, thank  you for allowing me to participate in the care of your patient.      Sincerely,    Megan Fields MD

## 2019-12-27 LAB — COPATH REPORT: NORMAL

## 2020-03-05 DIAGNOSIS — N39.0 URINARY TRACT INFECTION: Primary | ICD-10-CM

## 2020-03-05 DIAGNOSIS — N39.0 URINARY TRACT INFECTION: ICD-10-CM

## 2020-03-05 LAB
ALBUMIN UR-MCNC: 100 MG/DL
APPEARANCE UR: ABNORMAL
BILIRUB UR QL STRIP: NEGATIVE
COLOR UR AUTO: YELLOW
GLUCOSE UR STRIP-MCNC: NEGATIVE MG/DL
HGB UR QL STRIP: ABNORMAL
KETONES UR STRIP-MCNC: NEGATIVE MG/DL
LEUKOCYTE ESTERASE UR QL STRIP: ABNORMAL
NITRATE UR QL: NEGATIVE
PH UR STRIP: 6 PH (ref 5–7)
RBC #/AREA URNS AUTO: 19 /HPF (ref 0–2)
SOURCE: ABNORMAL
SP GR UR STRIP: 1.01 (ref 1–1.03)
UROBILINOGEN UR STRIP-MCNC: 0 MG/DL (ref 0–2)
WBC #/AREA URNS AUTO: >182 /HPF (ref 0–5)
WBC CLUMPS #/AREA URNS HPF: PRESENT /HPF

## 2020-03-06 LAB
BACTERIA SPEC CULT: NO GROWTH
Lab: NORMAL
SPECIMEN SOURCE: NORMAL

## 2020-03-08 ENCOUNTER — MYC MEDICAL ADVICE (OUTPATIENT)
Dept: UROLOGY | Facility: CLINIC | Age: 80
End: 2020-03-08

## 2020-03-09 NOTE — TELEPHONE ENCOUNTER
I called patient they are nervous about the UA results with increase of blood and rbc's in the urine I tried to explain to them but they are afraid his cancer is back.  Cytology negative.  Can he come in sooner for cysto rather than 6 months which would be may or June. Yuliana Silva, MOHAN Staff Nurse

## 2020-03-09 NOTE — TELEPHONE ENCOUNTER
[facility] Call Center    Phone Message    May a detailed message be left on voicemail: yes     Reason for Call: Other: Iqra calling on behalf of her , Max, to request a call back from Dr. Fields. Iqra is concerned that Max's bladder cancer is having a recurrence. Max had some questions regarding recent lab results, so Iqra is hoping Dr. Fields will get back to him at her earliest convenience. Please give Max a call back.     Action Taken: Message routed to:  [facility]  Uro    Travel Screening: Not Applicable

## 2020-03-12 ENCOUNTER — MYC MEDICAL ADVICE (OUTPATIENT)
Dept: UROLOGY | Facility: CLINIC | Age: 80
End: 2020-03-12

## 2020-03-13 NOTE — PROGRESS NOTES
Evangelista Sands,    Should patient have cysto done by Dr. Faustina Carey?or Another bladder cancer urologist? Please let the Urology clinic scheduler coordinators know the appropriate provider.    Thanks, Vikram Draper MA

## 2020-03-13 NOTE — TELEPHONE ENCOUNTER
[facility] Call Center    Phone Message    May a detailed message be left on voicemail: yes     Reason for Call: Other: pt's wife wants to know can the pt be seen with an uro oncologist for his up coming appt. Please call the pt's wife back to discuss.     Action Taken: Message routed to:  [facility] urology    Travel Screening: Negative

## 2020-03-30 NOTE — TELEPHONE ENCOUNTER
Left a detailed VM moved clinic appointment to April 8th at 9:30am with Dr Carey. Appointment with Dr Fields has been cancelled. Any questions to please call the clinic.

## 2020-03-31 ENCOUNTER — PRE VISIT (OUTPATIENT)
Dept: UROLOGY | Facility: CLINIC | Age: 80
End: 2020-03-31

## 2020-04-08 ENCOUNTER — VIRTUAL VISIT (OUTPATIENT)
Dept: FAMILY MEDICINE | Facility: OTHER | Age: 80
End: 2020-04-08

## 2020-04-08 NOTE — PROGRESS NOTES
"Date: 2020 09:49:24  Clinician: Miguel Perez  Clinician NPI: [id number]  Patient: Max Zuñiga  Patient : 1940  Patient Address: [facility] [place] [address]  Patient Phone: [phone]  Visit Protocol: URI  Patient Summary:  Max is a 79 year old ( : 1940 ) male who initiated a Visit for COVID-19 (Coronavirus) evaluation and screening. When asked the question \"Please sign me up to receive news, health information and promotions. \", Max responded \"No\".    Max states his symptoms started 1-2 days ago.   His symptoms consist of facial pain or pressure, myalgia, nasal congestion, malaise, chills, and a headache. He is experiencing difficulty breathing due to nasal congestion but he is not short of breath. Max also feels feverish.   Symptom details     Nasal secretions: The color of his mucus is blood-tinged.    Temperature: His current temperature is 100.0 degrees Fahrenheit.     Facial pain or pressure: The facial pain or pressure feels worse when bending over or leaning forward.     Headache: He states the headache is mild (1-3 on a 10 point pain scale).      Max denies having rhinitis, sore throat, cough, ear pain, diarrhea, vomiting, nausea, teeth pain, and wheezing. He also denies taking antibiotic medication for the symptoms, having recent facial or sinus surgery in the past 60 days, and having a sinus infection within the past year.   Precipitating events  He has not recently been exposed to someone with influenza. Max has been in close contact with the following high risk individuals: adults 65 or older.   Pertinent COVID-19 (Coronavirus) information  Max has not traveled internationally or to the areas where COVID-19 (Coronavirus) is widespread, including cruise ship travel in the last 14 days before the start of his symptoms.   Max has not had a close contact with a laboratory-confirmed COVID-19 patient within 14 days of symptom onset. He has had a " close contact with a suspected COVID-19 patient within 14 days of symptom onset. Additional information about contact with COVID-19 (Coronavirus) patient as reported by the patient (free text): My granddaughter was exposed to COVID-19 at day care - we didn't learn of it until a week later   Max does not work or volunteer as healthcare worker or a  and does not work or volunteer in a healthcare facility. He does not live with a healthcare worker.   Pertinent medical history  Max does not need a return to work/school note.   Weight: 161 lbs   Max does not smoke or use smokeless tobacco.   Weight: 161 lbs    MEDICATIONS: omeprazole oral, Buffered Aspirin oral, Pain Reliever (acetaminophen-aspirin) oral, ALLERGIES: NKDA  Clinician Response:  Dear Max,  Based on the information provided, you have viral sinusitis, also known as a sinus infection. Sinus infections are caused by bacteria or a virus and symptoms are almost always identical. The difference between the 2 types of infections is timing.  Sinus infections start as viral infections and symptoms improve on their own in about 7 days. If symptoms have not improved after 7 days or have even worsened, a bacterial infection may have developed.  I am sorry you are not feeling well. Your health is our priority. Based on the information you have provided, it is possible that you may have some type of viral infection.  Please read the full treatment plan and see my recommendations below.  Medication information  Because you have a viral infection, antibiotics will not help you get better. Treating a viral infection with antibiotics could actually make you feel worse.  I am prescribing:       Fluticasone 50 mcg/actuation nasal spray. Inhale 2 sprays in each nostril 1 time per day; after 1 week, may adjust to 1 - 2 sprays in each nostril 1 time per day. This medication takes several days to start working, so keep taking it even if it doesn't help right  away. There are no refills with this prescription.      Ibuprofen 800 mg oral tablet. Take 1 tablet by mouth 3 times per day as needed for 7 days. Do not exceed 2400mg in 24 hours. There are no refills with this prescription.     Unless you are allergic to the over-the-counter medication(s) below, I recommend using:     Guaifenesin + dextromethorphan (Robitussin DM, Mucinex DM, or store brand).   Over-the-counter medications do not require a prescription. Ask the pharmacist if you have any questions.  Self care  Steps you can take to be as comfortable as possible:     Rest.    Drink plenty of fluids.    Take a warm shower to loosen congestion    Use a cool-mist humidifier.     When to seek care  Please be seen in a clinic or urgent care if any of the following occur:   New symptoms develop, or symptoms become worse   Call ahead before going to the clinic or urgent care.  COVID-19 (Coronavirus) General Information  With the increase in the number of COVID-19 (Coronavirus) cases, we understand you may have some questions. Below is some helpful information on COVID-19 (Coronavirus).  How can I protect myself and others from the COVID-19 (Coronavirus)?  Because there is currently no vaccine to prevent infection, the best way to protect yourself is to avoid being exposed to this virus. Put distance between yourself and other people if COVID-19 (Coronavirus) is spreading in your community. The virus is thought to spread mainly from person-to-person.     Between people who are in close contact with one another (within about 6 about) for a prolonged period (10 minutes or longer).    Through respiratory droplets produced when an infected person coughs or sneezes.     The CDC recommends the following additional steps to protect yourself and others:     Wash your hands often with soap and water for at least 20 seconds, especially after blowing your nose, coughing, or sneezing; going to the bathroom; and before eating or  preparing food.  Use an alcohol-based hand  that contains at least 60 percent alcohol if soap and water are not available.        Avoid touching your eyes, nose and mouth with unwashed hands.    Avoid close contact with people who are sick.    Stay home when you are sick.    Cover your cough or sneeze with a tissue, then throw the tissue in the trash.    Clean and disinfect frequently touched objects and surfaces.     You can help stop COVID-19 (Coronavirus) by knowing the signs and symptoms:     Fever    Cough    Shortness of breath     Contact your healthcare provider if   Develop symptoms   AND   Have been in close contact with a person known to have COVID-19 (Coronavirus) or live in or have recently traveled from an area with ongoing spread of COVID-19 (Coronavirus). Call ahead before you go to a doctor's office or emergency room. Tell them about your recent travel and your symptoms.   For the most up to date information, visit the CDC's website.  Self-monitoring  Self-monitoring means people should monitor themselves for fever by taking their temperatures twice a day and remain alert for a cough or difficulty breathing.  It is important to check your health two times each day for 14 days after a potential exposure to a person with COVID-19 (Coronavirus) or after travel from a location where COVID-19 (Coronavirus) is widespread. If you have been exposed to a person with COVID-19 (Coronavirus), it may take up to 14 days to know if you will get sick. Follow the steps below to check and record your health.     Take your temperature with a thermometer twice a day, once in the morning and once in the evening, and watch for a cough or difficulty breathing for 14 days.    Write down your temperature and any COVID-19 symptoms you may have: feeling feverish, coughing, or difficulty breathing.    Stay home from work or school.    Do not take public transportation, taxis, or ride-shares.    Avoid crowded places  (such as shopping centers and movie theaters) and limit your activities in public.    Keep your distance from others (about 6 feet or 2 meters).    If you get sick with fever, cough, or trouble breathing, contact your healthcare provider and tell them about your recent travel and/or your symptoms.    If you need to seek medical care for other reasons, such as dialysis, call ahead to your doctor and tell them about your recent travel.     Steps to help prevent the spread of COVID-19 (Coronavirus) if you are sick  If you are sick with COVID-19 (Coronavirus) or suspect you are infected with the virus that causes COVID-19 (Coronavirus), follow the steps below to help prevent the disease from spreading&nbsp;to people in your home and community.     Stay home except to get medical care. Home isolation may be started in consultation with your healthcare clinician.    Separate yourself from other people and animals in your home.    Call ahead before visiting your doctor if you have a medical appointment.    Wear a facemask when you are around other people.    Cover your cough and sneezes.    Clean your hands often.    Avoid sharing personal household items.    Clean and disinfect frequently touched objects and surfaces everyday.    You will need to have someone drop off medications or household supplies (if needed) at your house without coming inside or in contact with you or others living in your house.    Monitor your symptoms and seek prompt medical care if your illness is worsening (e.g. Difficulty breathing).    Discontinue home isolation only in consultation with your healthcare provider.     For more detailed and up to date information on what to do if you are sick, visit this link: What to Do If You Are Sick With COVID-19.  Do I need to be tested for COVID-19 (Coronavirus)?     Not everyone needs to be tested for COVID-19 (Coronavirus). Decisions on which patients receive testing will be based on the local spread of  "COVID-19 (Coronavirus) as well as the symptoms. Your healthcare provider will make the final decision on whether you should be tested.    In the meantime, if you have concerns that you may have been exposed, it is reasonable to practice \"social distancing.\"&nbsp; If you are ill with a cold or flu-like illness, please monitor your symptoms and call your healthcare provider if your symptoms worsen.    For more up to date information, visit this link: COVID-19 (Coronavirus) Frequently Asked Questions and Answers.      Diagnosis: COVID-19 (Coronavirus) concern  Diagnosis ICD: Z03.818  Prescription: fluticasone 50 mcg/actuation nasal spray,suspension 1 120 spray aerosol with adapter (grams), 30 days supply. Inhale 2 sprays in each nostril 1 time per day; after 1 week, may adjust to 1 - 2 sprays in each nostril 1 time per day.. Refills: 0, Refill as needed: no, Allow substitutions: yes  Prescription: ibuprofen (IBU) 800 mg oral tablet 21 tablet, 7 days supply. Take 1 tablet by mouth 3 times per day as needed for 7 days. Refills: 0, Refill as needed: no, Allow substitutions: yes  "

## 2020-04-30 ENCOUNTER — TELEPHONE (OUTPATIENT)
Dept: UROLOGY | Facility: CLINIC | Age: 80
End: 2020-04-30

## 2020-04-30 NOTE — TELEPHONE ENCOUNTER
Called patient and LVM to reschedule appointment to 5/6/2020 in the afternoon. If patient returns call and agrees to reschedule to sooner appointment, please book cysto appointment with Dr. Carey for 5/6/2020 at any open spot after 12:00.     Salvador Watson, EMT

## 2020-05-05 ENCOUNTER — PRE VISIT (OUTPATIENT)
Dept: UROLOGY | Facility: CLINIC | Age: 80
End: 2020-05-05

## 2020-05-05 NOTE — TELEPHONE ENCOUNTER
Chief Complaint : Cysto    Hx/Sx: Bladder cancer     Records/Orders: Available    Pt Contacted: N/a    At Rooming: Cysto, urine for cytology    Salvador Watson, EMT

## 2020-05-13 ENCOUNTER — OFFICE VISIT (OUTPATIENT)
Dept: UROLOGY | Facility: CLINIC | Age: 80
End: 2020-05-13
Payer: COMMERCIAL

## 2020-05-13 DIAGNOSIS — N40.0 BENIGN PROSTATIC HYPERPLASIA, UNSPECIFIED WHETHER LOWER URINARY TRACT SYMPTOMS PRESENT: ICD-10-CM

## 2020-05-13 DIAGNOSIS — C67.9 MALIGNANT NEOPLASM OF URINARY BLADDER, UNSPECIFIED SITE (H): Primary | ICD-10-CM

## 2020-05-13 RX ORDER — LIDOCAINE HYDROCHLORIDE 20 MG/ML
JELLY TOPICAL ONCE
Status: COMPLETED | OUTPATIENT
Start: 2020-05-13 | End: 2020-05-13

## 2020-05-13 RX ORDER — TAMSULOSIN HYDROCHLORIDE 0.4 MG/1
0.4 CAPSULE ORAL DAILY
Qty: 90 CAPSULE | Refills: 4 | Status: SHIPPED | OUTPATIENT
Start: 2020-05-13

## 2020-05-13 RX ADMIN — LIDOCAINE HYDROCHLORIDE: 20 JELLY TOPICAL at 15:41

## 2020-05-13 ASSESSMENT — PAIN SCALES - GENERAL: PAINLEVEL: NO PAIN (0)

## 2020-05-13 NOTE — LETTER
5/13/2020       RE: Max Zuñiga  [address]  [facility] [address]     Dear Colleague,    Thank you for referring your patient, Max Zuñiga, to the [facility] UROLOGY AND [facility] FOR PROSTATE AND UROLOGIC CANCERS at [facility]. Please see a copy of my visit note below.    Pre-procedure diagnosis: Bladder cancer HG T1 post induction BCG last tumor 11/2017  Post procedure diagnosis: normal cystoscopy  Procedure performed: cystoscopy  Surgeon: EVE Carey MD  Anesthesia: local     Indications for procedure: Patient is a 79 year old male with a history of bladder cancer  Here today for surveillance cysto     Description of procedure: After fully informed voluntary consent was obtained patient was brought into the procedure room, identified and placed in a supine position on the cysto table.  The groin/scrotum were prepped and draped in a sterile fashion with betadine.  A 15F flexible cystoscope was inserted into the urethra and the bladder and urethra examined in a systematic manner.  There were no tumor stones or diverticula.  Ureteric orifices were normal in position and number and effluxing clear urine.  The prostate was 3 cm long and showed bilobar hypertrophy.  There was a median lobe.  Distal urethra was normal.  The patient tolerated the procedure well and there were no complications.     Rectal exam shows a small nodule on the right base.  C/o nocturia x4.    Assessment/Plan: Patient with a history of bladder cancer with negative cystoscopy.  Follow up in 6 months for surveillance.  We will obtain a urine cytology and FISH today. Check PSA.  Given flomax    Again, thank you for allowing me to participate in the care of your patient.      Sincerely,    Faustina Carey MD

## 2020-05-13 NOTE — NURSING NOTE
No chief complaint on file.      There were no vitals taken for this visit. There is no height or weight on file to calculate BMI.    Patient Active Problem List   Diagnosis     Malignant neoplasm of overlapping sites of bladder (H)     Chondromalacia of patella     Hammer toes of both feet     Impulse control disorder     Internal derangement of knee     Laryngopharyngeal reflux disease     Lattice degeneration of peripheral retina     Low back pain with radiation     Personal history of other malignant neoplasm of skin     Pseudophakia of left eye     Secondary localized osteoarthrosis, lower leg     Senile nuclear sclerosis     Right inguinal hernia     Sensorineural hearing loss       No Known Allergies    Current Outpatient Medications   Medication Sig Dispense Refill     colchicine 0.6 MG tablet 1.2 mg at the first sign of flare, followed in 1 hour with a single dose of 0.6 mg (maximum: 1.8 mg within 1 hour)       glucosamine 500 MG CAPS Take 2 capsules by mouth every morning        indomethacin (INDOCIN) 25 MG capsule Take 25 mg by mouth       levofloxacin (LEVAQUIN) 500 MG tablet Take 1 tablet 6 hours post treatment and 1 tablet each AM following each treatment. (Patient not taking: Reported on 6/10/2019) 6 tablet 6     MAGNESIUM OXIDE PO Take 1 tablet by mouth daily        Multiple vitamin TABS Take 1 tablet by mouth every morning        OMEPRAZOLE PO Take 20 mg by mouth daily       SF 5000 PLUS 1.1 % CREA PLACE PEA SIZED AMOUNT ON TOOTHBRUSH. U 2 TIMES  PER DAY IN PLACE OF REGULAR PASTE.  0     sildenafil (VIAGRA) 100 MG tablet Take 0.5-1 Tabs by mouth. Take it one hour before sexual activity.         Social History     Tobacco Use     Smoking status: Former Smoker     Packs/day: 2.00     Years: 20.00     Pack years: 40.00     Types: Cigarettes     Smokeless tobacco: Never Used     Tobacco comment: quit in 1979   Substance Use Topics     Alcohol use: Yes     Comment: 2 beers daily     Drug use: No        Invasive Procedure Safety Checklist:    Procedure: Cystoscopy    Action: Complete sections and checkboxes as appropriate.    Pre-procedure:  1. Patient ID Verified with 2 identifiers (Arely and  or MRN) : YES    2. Procedure and site verified with patient/designee (when able) : YES    3. Accurate consent documentation in medical record : YES    4. H&P (or appropriate assessment) documented in medical record : N/A  H&P must be up to 30 days prior to procedure an updated within 24 hours of                 Procedure as applicable.     5. Relevant diagnostic and radiology test results appropriately labeled and displayed as applicable : YES    6. Blood products, implants, devices, and/or special equipment available for the procedure as applicable : YES    7. Procedure site(s) marked with provider initials [Exclusions: none] : NO    8. Marking not required. Reason : Yes  Procedure does not require site marking    Time Out:     Time-Out performed immediately prior to starting procedure, including verbal and active participation of all team members addressing: YES    1. Correct patient identity.  2. Confirmed that the correct side and site are marked.  3. An accurate procedure to be done.  4. Agreement on the procedure to be done.  5. Correct patient position.  6. Relevant images and results are properly labeled and appropriately displayed.  7. The need to administer antibiotics or fluids for irrigation purposes during the procedure as applicable.  8. Safety precautions based on patient history or medication use.    During Procedure: Verification of correct person, site, and procedure occurs any time the responsibility for care of the patient is transferred to another member of the care team.    The following medication was given:     MEDICATION: Lidocaine Uro-Jet 2% 200mg (20mg/mL)  ROUTE: Urethral   SITE: Urethra   DOSE: 10mL  LOT #: [id number]  : [employer]   EXPIRATION DATE:   NDC#: [address]-24    Was there drug waste? No    Prior to injection, verified patient identity using patient's name and date of birth.  Due to injection administration, patient instructed to remain in clinic for 15 minutes  afterwards, and to report any adverse reaction to me immediately.    Drug Amount Wasted:  None.  Vial/Syringe: Single dose vial      Salvador Watson, EMT  5/13/2020  3:26 PM

## 2020-05-13 NOTE — PROGRESS NOTES
Pre-procedure diagnosis: Bladder cancer HG T1 post induction BCG last tumor 11/2017  Post procedure diagnosis: normal cystoscopy  Procedure performed: cystoscopy  Surgeon: EVE Carey MD  Anesthesia: local     Indications for procedure: Patient is a 79 year old male with a history of bladder cancer  Here today for surveillance cysto     Description of procedure: After fully informed voluntary consent was obtained patient was brought into the procedure room, identified and placed in a supine position on the cysto table.  The groin/scrotum were prepped and draped in a sterile fashion with betadine.  A 15F flexible cystoscope was inserted into the urethra and the bladder and urethra examined in a systematic manner.  There were no tumor stones or diverticula.  Ureteric orifices were normal in position and number and effluxing clear urine.  The prostate was 3 cm long and showed bilobar hypertrophy.  There was a median lobe.  Distal urethra was normal.  The patient tolerated the procedure well and there were no complications.     Rectal exam shows a small nodule on the right base.  C/o nocturia x4.    Assessment/Plan: Patient with a history of bladder cancer with negative cystoscopy.  Follow up in 6 months for surveillance.  We will obtain a urine cytology and FISH today. Check PSA.  Given flomax

## 2020-05-13 NOTE — PATIENT INSTRUCTIONS
"Please complete PSA and follow up in 6 moths.    It was a pleasure meeting with you today.  Thank you for allowing me and my team the privilege of caring for you today.  YOU are the reason we are here, and I truly hope we provided you with the excellent service you deserve.  Please let us know if there is anything else we can do for you so that we can be sure you are leaving completely satisfied with your care experience.        Salvador Watson, EMT     AFTER YOUR CYSTOSCOPY        You have just completed a cystoscopy, or \"cysto\", which allowed your physician to learn more about your bladder (or to remove a stent placed after surgery). We suggest that you continue to avoid caffeine, fruit juice, and alcohol for the next 24 hours, however, you are encouraged to return to your normal activities.         A few things that are considered normal after your cystoscopy:     * Small amount of bleeding (or spotting) that clears within the next 24 hours     * Slight burning sensation with urination     * Sensation to of needing to avoid more frequently     * The feeling of \"air\" in your urine     * Mild discomfort that is relieved with Tylenol        Please contact our office promptly if you:     * Develop a fever above 101 degrees     * Are unable to urinate     * Develop bright red blood that does not stop     * Severe pain or swelling         Please contact our office with any concerns or questions @DEPTPHN.  "

## 2020-05-14 LAB — PSA SERPL-MCNC: 2.57 UG/L (ref 0–4)

## 2020-05-18 ENCOUNTER — MYC MEDICAL ADVICE (OUTPATIENT)
Dept: UROLOGY | Facility: CLINIC | Age: 80
End: 2020-05-18

## 2020-05-20 ENCOUNTER — TELEPHONE (OUTPATIENT)
Dept: UROLOGY | Facility: CLINIC | Age: 80
End: 2020-05-20

## 2020-05-20 LAB — COPATH REPORT: NORMAL

## 2020-05-20 NOTE — TELEPHONE ENCOUNTER
----- Message from Shavon Swann RN sent at 5/20/2020 10:54 AM CDT -----  Please call and schedule this patient with the prostate nurse for a PVR.  Thank you  ----- Message -----  From: Faustina Carey MD  Sent: 5/20/2020  10:44 AM CDT  To: Shavon Swann RN, Brittany Smallwood [facility]    Can we arrange a nurse visit for a PVR?

## 2020-05-26 ENCOUNTER — ALLIED HEALTH/NURSE VISIT (OUTPATIENT)
Dept: UROLOGY | Facility: CLINIC | Age: 80
End: 2020-05-26
Payer: COMMERCIAL

## 2020-05-26 DIAGNOSIS — R35.1 NOCTURIA: Primary | ICD-10-CM

## 2020-05-26 NOTE — PROGRESS NOTES
Patient presents to the Urology department on the nurse schedule today for post void residual check.  After voiding, a bladder scan was performed which revealed 414 ml's.  I did consult with Janeth Parson PA-C who was staffing nurse visits today.  Per Janeth, patient could try CIC before bed since his symptoms are worse overnight.  Or, patient could continue taking Flomax for another couple of weeks and recheck PVR.  Patient chose to try the Flomax for another 2 weeks and repeat PVR.  Nurse appointment scheduled for patient.  He will contact the clinic if his symptoms worsen.    Yessica Ford, CMA

## 2020-06-12 ENCOUNTER — ALLIED HEALTH/NURSE VISIT (OUTPATIENT)
Dept: UROLOGY | Facility: CLINIC | Age: 80
End: 2020-06-12
Payer: COMMERCIAL

## 2020-06-12 DIAGNOSIS — N40.0 BENIGN PROSTATIC HYPERPLASIA, UNSPECIFIED WHETHER LOWER URINARY TRACT SYMPTOMS PRESENT: Primary | ICD-10-CM

## 2020-06-12 NOTE — PROGRESS NOTES
Max Zuñiga comes into clinic today at the request of Janeth Parson PA-C for post void residual.    Patient states he has been taking Flomax as directed and he is pleased with his urinary symptoms. Patient instructed to void, after voiding his post void residual was 291 mL. Dr. Calloway, staffing provider, notified. Per Dr. Calloway, pt is to continue taking Flomax and follow up with Dr. Hester or Dr. Cooper for a symptom check and PVR.     He will contact the clinic if his symptoms change or worsen.    This service provided today was under the supervising provider of the day Dr. Calloway, who was available if needed.    Brittany Smallwood, CMA

## 2020-09-14 ENCOUNTER — PRE VISIT (OUTPATIENT)
Dept: UROLOGY | Facility: CLINIC | Age: 80
End: 2020-09-14

## 2020-09-22 ENCOUNTER — VIRTUAL VISIT (OUTPATIENT)
Dept: UROLOGY | Facility: CLINIC | Age: 80
End: 2020-09-22
Payer: COMMERCIAL

## 2020-09-22 DIAGNOSIS — N40.0 ENLARGED PROSTATE: Primary | ICD-10-CM

## 2020-09-22 RX ORDER — NEOMYCIN SULFATE, POLYMYXIN B SULFATE, AND DEXAMETHASONE 3.5; 10000; 1 MG/G; [USP'U]/G; MG/G
0.5 OINTMENT OPHTHALMIC
COMMUNITY
Start: 2020-09-09

## 2020-09-22 RX ORDER — ACETAMINOPHEN 325 MG/1
325 TABLET ORAL
COMMUNITY
Start: 2020-09-09

## 2020-09-22 RX ORDER — AMLODIPINE BESYLATE 5 MG/1
5 TABLET ORAL
COMMUNITY
Start: 2020-09-17 | End: 2021-09-17

## 2020-09-22 RX ORDER — BRIMONIDINE TARTRATE 2 MG/ML
SOLUTION/ DROPS OPHTHALMIC
COMMUNITY
Start: 2020-09-18

## 2020-09-22 RX ORDER — SODIUM FLUORIDE 6 MG/ML
PASTE, DENTIFRICE DENTAL
COMMUNITY
Start: 2020-07-22

## 2020-09-22 RX ORDER — PREDNISOLONE ACETATE 10 MG/ML
1 SUSPENSION/ DROPS OPHTHALMIC
COMMUNITY
Start: 2020-09-17

## 2020-09-22 RX ORDER — TIMOLOL MALEATE 5 MG/ML
1 SOLUTION/ DROPS OPHTHALMIC
COMMUNITY
Start: 2020-09-17 | End: 2023-12-08

## 2020-09-22 ASSESSMENT — PAIN SCALES - GENERAL: PAINLEVEL: NO PAIN (0)

## 2020-09-22 NOTE — PROGRESS NOTES
Video Visit Technology for this patient: [employer] Video Visit- Please send an invite to patient to patient's email address -  [email or web address]      Max Zuñiga is a 79 year old male who is being evaluated via a billable video visit.            UROLOGY VIDEO FOLLOW UP NOTE           Chief Complaint:   BPH/LUTS         Interval Update    Max Zuñiga is a very pleasant 79 yo M with hx of bladder cancer and LUTS.    Pathology: high grade pT1 (11/15/17)  Most recent resection: 17: no evidence of malignancy  Last surveillance cysto: 2020  Last upper tract imagin2019 (no evidence of urinary tract malignancy or upper tract disease)  Last cytology:  (negative)    Brief  History: Doing generally well.  Has moderate LUTS with nocturia 3-4x.  Has had a recent PVR which was elevated but symptoms are overall improved since starting flomax.  HE does carry an elevated residual but denies retention, hematuria, dysuria.       Physical Exam:   General Appearance: Well groomed, hygenic  Eyes: No redness, discharge  Respiratory: No cough, no respiratory distress or labored breathing  Musculoskeletal:  grossly normal, full range of motion in upper extremities, no gross deficits  Skin: No discoloration or apparent rashes  Neurologic - No tremors  Psychiatric - Alert and oriented  The rest of a comprehensive physical examination is deferred due to public health emergency video visit restrictions      Labs and Pathology:    I personally reviewed all applicable laboratory data and went over findings with patient  Significant for:     BMP RESULTS:  Recent Labs   Lab Test 19  1202   GFRESTIMATED 82   GFRESTBLACK >90       UA RESULTS:   Recent Labs   Lab Test 20  1123 07/15/19  1048 18  0800 17  0952 17  0952   SG 1.011 1.010 1.011   < > 1.012   URINEPH 6.0 6.0 6.0   < > 6.0   NITRITE Negative Negative Negative   < > Negative   RBCU 19* 11*  --   --  2   WBCU >182* >182*  --   --  12*    < > =  values in this interval not displayed.       PSA RESULTS  PSA   Date Value Ref Range Status   05/13/2020 2.57 0 - 4 ug/L Final     Comment:     Assay Method:  Chemiluminescence using Siemens Vista analyzer           Imaging:    I personally reviewed all applicable imaging including the images themselves and report below and went over the below findings with patient.    Results for orders placed or performed in visit on 06/03/19   CT Urogram    Narrative    EXAMINATION: CT ABDOMEN PELVIS W/O & W CONTRAST, 6/3/2019 12:24  PM    TECHNIQUE:  Helical CT images from the lung bases through the  symphysis pubis were obtained  without and with contrast using CT  urogram protocol. Contrast dose: ISOVUE 370 104cc    COMPARISON: CT 11/28/2012, ultrasound 10/18/2016    HISTORY: Malignant neoplasm of anterior wall of urinary bladder (H)    FINDINGS:    Urinary tract: No renal calculus or hydronephrosis. Simple fluid  density left-sided renal cysts. No solid-appearing renal mass. The  right proximal/mid ureter is well-opacified and and nearly the entire  left ureter is well opacified, without filling defect. No soft tissue  mass surrounding the nonopacified right distal ureter.    Circumferential bladder wall thickening with multiple bladder  diverticula, likely sequelae of chronic bladder outlet narrowing. No  filling defect in the partially opacified posterior bladder.    Remainder of the abdomen and pelvis: The liver, gallbladder, spleen,  adrenal glands, and pancreas are normal. Small and large bowel are  nondistended. Mild colonic diverticulosis. Normal appendix. Surgical  changes in the pelvis with stable lateral protrusion of the left  perirectal fat and seminal vesicle (series 6 image 359). No free  fluid. Scattered small lymph nodes without suspicious features. No  abdominal aortic aneurysm.    Lung bases:  Bilateral gynecomastia, partially visualized on the  right. Coronary artery and mitral annular calcifications.  No  pericardial or pleural effusion. Mild mosaic attenuation in the lung  bases may be related to small airways or small vessel disease. No  focal consolidation. In the left lower lobe, there is a 3 mm pulmonary  nodule (series 3 image 39), unchanged from 11/28/2012.    Bones and soft tissues: Multilevel degenerative changes of the spine.  Lumbosacral transitional vertebral body with left-sided  pseudoarthrosis. Small amount of fluid and soft tissue thickening  along the superior aspect of the right greater trochanter. No  aggressive appearing bone lesion.      Impression    IMPRESSION:   1. No evidence of urinary tract malignancy or metastatic disease.   2. Incidental findings as above.    I have personally reviewed the examination and initial interpretation  and I agree with the findings.    TONY RANGEL MD              Assessment/Plan   80 year old male with LUTS/BPH, hx of bladder cancer  -Will bring in for next surveilance cysto next month.  Can further assess prostate and flow/pvr at that time.  Continue flomax in interim,         Past Medical History:     Past Medical History:   Diagnosis Date     Bladder cancer (H)      Cataract      Laryngopharyngeal reflux disease      Macular degeneration     lattice     OA (osteoarthritis)      Sensorineural hearing loss             Past Surgical History:     Past Surgical History:   Procedure Laterality Date     CYSTOSCOPY, BIOPSY BLADDER INSTILL OPTICAL AGENT N/A 12/18/2017    Procedure: CYSTOSCOPY, BIOPSY BLADDER INSTILL OPTICAL AGENT;  Cystoscopy, Bladder Biopsy,  Fulguration;  Surgeon: Faustina Carey MD;  Location: [facility] OR     CYSTOSCOPY, TRANSURETHRAL RESECTION (TUR) TUMOR BLADDER, COMBINED  2012, 11/15/2017     HC ECP WITH CATARACT SURGERY Left 11/28/2016     HC TOOTH EXTRACTION W/FORCEP       HERNIA REPAIR  1998     PROBE NASOLACRIMAL DUCT  2008    OS     Right knee arthroscopy  1997     TONSILLECTOMY              Medications     Current Outpatient  Medications   Medication     acetaminophen (TYLENOL) 325 MG tablet     amLODIPine (NORVASC) 5 MG tablet     brimonidine (ALPHAGAN) 0.2 % ophthalmic solution     colchicine 0.6 MG tablet     indomethacin (INDOCIN) 25 MG capsule     Magnesium Oxide 250 MG TABS     MAGNESIUM OXIDE PO     Multiple vitamin TABS     neomycin-polymyxin-dexamethasone (MAXITROL) 3.5-37498-9.1 ophthalmic ointment     OMEPRAZOLE PO     prednisoLONE acetate (PRED FORTE) 1 % ophthalmic suspension     PREVIDENT 5000 BOOSTER PLUS 1.1 % PSTE     SF 5000 PLUS 1.1 % CREA     tamsulosin (FLOMAX) 0.4 MG capsule     timolol maleate (TIMOPTIC) 0.5 % ophthalmic solution     No current facility-administered medications for this visit.             Family History:     Family History   Problem Relation Age of Onset     Hypertension Mother      Cerebrovascular Disease Mother      Tuberculosis Father      Cataracts Sister      Skin Cancer Sister      Attention Deficit Disorder Daughter      Scoliosis Daughter             Social History:     Social History     Socioeconomic History     Marital status:      Spouse name: Not on file     Number of children: Not on file     Years of education: Not on file     Highest education level: Not on file   Occupational History     Not on file   Social Needs     Financial resource strain: Not on file     Food insecurity     Worry: Not on file     Inability: Not on file     Transportation needs     Medical: Not on file     Non-medical: Not on file   Tobacco Use     Smoking status: Former Smoker     Packs/day: 2.00     Years: 20.00     Pack years: 40.00     Types: Cigarettes     Smokeless tobacco: Never Used     Tobacco comment: quit in 1979   Substance and Sexual Activity     Alcohol use: Yes     Comment: 2 beers daily     Drug use: No     Sexual activity: Not on file   Lifestyle     Physical activity     Days per week: Not on file     Minutes per session: Not on file     Stress: Not on file   Relationships     Social  "connections     Talks on phone: Not on file     Gets together: Not on file     Attends Worship service: Not on file     Active member of club or organization: Not on file     Attends meetings of clubs or organizations: Not on file     Relationship status: Not on file     Intimate partner violence     Fear of current or ex partner: Not on file     Emotionally abused: Not on file     Physically abused: Not on file     Forced sexual activity: Not on file   Other Topics Concern     Not on file   Social History Narrative     Not on file            Allergies:   Patient has no known allergies.         Review of Systems:  From intake questionnaire   Negative 14 system review except as noted on HPI, nurse's note.        CC:  Bee Schaffer            The patient has been notified of following:     \"This video visit will be conducted via a call between you and your physician/provider. We have found that certain health care needs can be provided without the need for an in-person physical exam.  This service lets us provide the care you need with a video conversation.  If a prescription is necessary we can send it directly to your pharmacy.  If lab work is needed we can place an order for that and you can then stop by our lab to have the test done at a later time.    Video visits are billed at different rates depending on your insurance coverage.  Please reach out to your insurance provider with any questions.    If during the course of the call the physician/provider feels a video visit is not appropriate, you will not be charged for this service.\"    Patient has given verbal consent for Video visit? Yes  How would you like to obtain your AVS? MyChart  If you are dropped from the video visit, the video invite should be resent to: Send to e-mail at: [email or web address]  Will anyone else be joining your video visit? No        Video-Visit Details    Type of service:  Video Visit    Video Start Time: 2:18  Video End Time: " 2:32    Originating Location (pt. Location): Home    Distant Location (provider location):  [facility] UROLOGY AND [facility] FOR PROSTATE AND UROLOGIC CANCERS     Platform used for Video Visit: Jadyn Hester MD

## 2020-09-22 NOTE — LETTER
2020       RE: Max Zuñiga  [address]  [facility] [address]     Dear Colleague,    Thank you for referring your patient, Max Zuñiga, to the [facility] UROLOGY AND INST FOR PROSTATE AND UROLOGIC CANCERS at [facility]. Please see a copy of my visit note below.    Video Visit Technology for this patient: Cegal Video Visit- Please send an invite to patient to patient's email address -  [email or web address]      Max Zuñiga is a 79 year old male who is being evaluated via a billable video visit.            UROLOGY VIDEO FOLLOW UP NOTE           Chief Complaint:   BPH/LUTS         Interval Update    Max Zuñiga is a very pleasant 79 yo M with hx of bladder cancer and LUTS.    Pathology: high grade pT1 (11/15/17)  Most recent resection: 17: no evidence of malignancy  Last surveillance cysto: 2020  Last upper tract imagin2019 (no evidence of urinary tract malignancy or upper tract disease)  Last cytology:  (negative)    Brief  History: Doing generally well.  Has moderate LUTS with nocturia 3-4x.  Has had a recent PVR which was elevated but symptoms are overall improved since starting flomax.  HE does carry an elevated residual but denies retention, hematuria, dysuria.       Physical Exam:   General Appearance: Well groomed, hygenic  Eyes: No redness, discharge  Respiratory: No cough, no respiratory distress or labored breathing  Musculoskeletal:  grossly normal, full range of motion in upper extremities, no gross deficits  Skin: No discoloration or apparent rashes  Neurologic - No tremors  Psychiatric - Alert and oriented  The rest of a comprehensive physical examination is deferred due to public health emergency video visit restrictions      Labs and Pathology:    I personally reviewed all applicable laboratory data and went over findings with patient  Significant for:     BMP RESULTS:  Recent Labs   Lab Test 19  1202   GFRESTIMATED 82    GFRESTBLACK >90       UA RESULTS:   Recent Labs   Lab Test 03/05/20  1123 07/15/19  1048 12/27/18  0800 12/13/17  0952 12/13/17  0952   SG 1.011 1.010 1.011   < > 1.012   URINEPH 6.0 6.0 6.0   < > 6.0   NITRITE Negative Negative Negative   < > Negative   RBCU 19* 11*  --   --  2   WBCU >182* >182*  --   --  12*    < > = values in this interval not displayed.       PSA RESULTS  PSA   Date Value Ref Range Status   05/13/2020 2.57 0 - 4 ug/L Final     Comment:     Assay Method:  Chemiluminescence using Siemens Vista analyzer           Imaging:    I personally reviewed all applicable imaging including the images themselves and report below and went over the below findings with patient.    Results for orders placed or performed in visit on 06/03/19   CT Urogram    Narrative    EXAMINATION: CT ABDOMEN PELVIS W/O & W CONTRAST, 6/3/2019 12:24  PM    TECHNIQUE:  Helical CT images from the lung bases through the  symphysis pubis were obtained  without and with contrast using CT  urogram protocol. Contrast dose: ISOVUE 370 104cc    COMPARISON: CT 11/28/2012, ultrasound 10/18/2016    HISTORY: Malignant neoplasm of anterior wall of urinary bladder (H)    FINDINGS:    Urinary tract: No renal calculus or hydronephrosis. Simple fluid  density left-sided renal cysts. No solid-appearing renal mass. The  right proximal/mid ureter is well-opacified and and nearly the entire  left ureter is well opacified, without filling defect. No soft tissue  mass surrounding the nonopacified right distal ureter.    Circumferential bladder wall thickening with multiple bladder  diverticula, likely sequelae of chronic bladder outlet narrowing. No  filling defect in the partially opacified posterior bladder.    Remainder of the abdomen and pelvis: The liver, gallbladder, spleen,  adrenal glands, and pancreas are normal. Small and large bowel are  nondistended. Mild colonic diverticulosis. Normal appendix. Surgical  changes in the pelvis with stable  lateral protrusion of the left  perirectal fat and seminal vesicle (series 6 image 359). No free  fluid. Scattered small lymph nodes without suspicious features. No  abdominal aortic aneurysm.    Lung bases:  Bilateral gynecomastia, partially visualized on the  right. Coronary artery and mitral annular calcifications. No  pericardial or pleural effusion. Mild mosaic attenuation in the lung  bases may be related to small airways or small vessel disease. No  focal consolidation. In the left lower lobe, there is a 3 mm pulmonary  nodule (series 3 image 39), unchanged from 11/28/2012.    Bones and soft tissues: Multilevel degenerative changes of the spine.  Lumbosacral transitional vertebral body with left-sided  pseudoarthrosis. Small amount of fluid and soft tissue thickening  along the superior aspect of the right greater trochanter. No  aggressive appearing bone lesion.      Impression    IMPRESSION:   1. No evidence of urinary tract malignancy or metastatic disease.   2. Incidental findings as above.    I have personally reviewed the examination and initial interpretation  and I agree with the findings.    TONY RANGEL MD              Assessment/Plan   80 year old male with LUTS/BPH, hx of bladder cancer  -Will bring in for next surveilance cysto next month.  Can further assess prostate and flow/pvr at that time.  Continue flomax in interim,         Past Medical History:     Past Medical History:   Diagnosis Date     Bladder cancer (H)      Cataract      Laryngopharyngeal reflux disease      Macular degeneration     lattice     OA (osteoarthritis)      Sensorineural hearing loss             Past Surgical History:     Past Surgical History:   Procedure Laterality Date     CYSTOSCOPY, BIOPSY BLADDER INSTILL OPTICAL AGENT N/A 12/18/2017    Procedure: CYSTOSCOPY, BIOPSY BLADDER INSTILL OPTICAL AGENT;  Cystoscopy, Bladder Biopsy,  Fulguration;  Surgeon: Faustina Carey MD;  Location: [facility] OR     CYSTOSCOPY,  TRANSURETHRAL RESECTION (TUR) TUMOR BLADDER, COMBINED  2012, 11/15/2017     HC ECP WITH CATARACT SURGERY Left 11/28/2016     HC TOOTH EXTRACTION W/FORCEP       HERNIA REPAIR  1998     PROBE NASOLACRIMAL DUCT  2008    OS     Right knee arthroscopy  1997     TONSILLECTOMY              Medications     Current Outpatient Medications   Medication     acetaminophen (TYLENOL) 325 MG tablet     amLODIPine (NORVASC) 5 MG tablet     brimonidine (ALPHAGAN) 0.2 % ophthalmic solution     colchicine 0.6 MG tablet     indomethacin (INDOCIN) 25 MG capsule     Magnesium Oxide 250 MG TABS     MAGNESIUM OXIDE PO     Multiple vitamin TABS     neomycin-polymyxin-dexamethasone (MAXITROL) 3.5-55959-7.1 ophthalmic ointment     OMEPRAZOLE PO     prednisoLONE acetate (PRED FORTE) 1 % ophthalmic suspension     PREVIDENT 5000 BOOSTER PLUS 1.1 % PSTE     SF 5000 PLUS 1.1 % CREA     tamsulosin (FLOMAX) 0.4 MG capsule     timolol maleate (TIMOPTIC) 0.5 % ophthalmic solution     No current facility-administered medications for this visit.             Family History:     Family History   Problem Relation Age of Onset     Hypertension Mother      Cerebrovascular Disease Mother      Tuberculosis Father      Cataracts Sister      Skin Cancer Sister      Attention Deficit Disorder Daughter      Scoliosis Daughter             Social History:     Social History     Socioeconomic History     Marital status:      Spouse name: Not on file     Number of children: Not on file     Years of education: Not on file     Highest education level: Not on file   Occupational History     Not on file   Social Needs     Financial resource strain: Not on file     Food insecurity     Worry: Not on file     Inability: Not on file     Transportation needs     Medical: Not on file     Non-medical: Not on file   Tobacco Use     Smoking status: Former Smoker     Packs/day: 2.00     Years: 20.00     Pack years: 40.00     Types: Cigarettes     Smokeless tobacco: Never Used  "    Tobacco comment: quit in 1979   Substance and Sexual Activity     Alcohol use: Yes     Comment: 2 beers daily     Drug use: No     Sexual activity: Not on file   Lifestyle     Physical activity     Days per week: Not on file     Minutes per session: Not on file     Stress: Not on file   Relationships     Social connections     Talks on phone: Not on file     Gets together: Not on file     Attends Bahai service: Not on file     Active member of club or organization: Not on file     Attends meetings of clubs or organizations: Not on file     Relationship status: Not on file     Intimate partner violence     Fear of current or ex partner: Not on file     Emotionally abused: Not on file     Physically abused: Not on file     Forced sexual activity: Not on file   Other Topics Concern     Not on file   Social History Narrative     Not on file            Allergies:   Patient has no known allergies.         Review of Systems:  From intake questionnaire   Negative 14 system review except as noted on HPI, nurse's note.        CC:  Bee Schaffer            The patient has been notified of following:     \"This video visit will be conducted via a call between you and your physician/provider. We have found that certain health care needs can be provided without the need for an in-person physical exam.  This service lets us provide the care you need with a video conversation.  If a prescription is necessary we can send it directly to your pharmacy.  If lab work is needed we can place an order for that and you can then stop by our lab to have the test done at a later time.    Video visits are billed at different rates depending on your insurance coverage.  Please reach out to your insurance provider with any questions.    If during the course of the call the physician/provider feels a video visit is not appropriate, you will not be charged for this service.\"    Patient has given verbal consent for Video visit? Yes  How would " you like to obtain your AVS? [facility]  If you are dropped from the video visit, the video invite should be resent to: Send to e-mail at: [email or web address]  Will anyone else be joining your video visit? No        Video-Visit Details    Type of service:  Video Visit    Video Start Time: 2:18  Video End Time: 2:32    Originating Location (pt. Location): Home    Distant Location (provider location):  [facility] UROLOGY AND [facility] FOR PROSTATE AND UROLOGIC CANCERS     Platform used for Video Visit: Jadyn Hester MD

## 2020-11-05 ENCOUNTER — PRE VISIT (OUTPATIENT)
Dept: UROLOGY | Facility: CLINIC | Age: 80
End: 2020-11-05

## 2020-11-05 NOTE — TELEPHONE ENCOUNTER
BPH/LUTS/bladder cancer follow up with cystoscopy.  Records available in [facility].

## 2020-11-24 ENCOUNTER — OFFICE VISIT (OUTPATIENT)
Dept: UROLOGY | Facility: CLINIC | Age: 80
End: 2020-11-24
Payer: COMMERCIAL

## 2020-11-24 VITALS
HEIGHT: 69 IN | BODY MASS INDEX: 22.22 KG/M2 | SYSTOLIC BLOOD PRESSURE: 165 MMHG | HEART RATE: 60 BPM | DIASTOLIC BLOOD PRESSURE: 94 MMHG | WEIGHT: 150 LBS

## 2020-11-24 DIAGNOSIS — N40.0 ENLARGED PROSTATE: Primary | ICD-10-CM

## 2020-11-24 DIAGNOSIS — C67.8 MALIGNANT NEOPLASM OF OVERLAPPING SITES OF BLADDER (H): ICD-10-CM

## 2020-11-24 PROCEDURE — 88305 TISSUE EXAM BY PATHOLOGIST: CPT | Mod: 26 | Performed by: PATHOLOGY

## 2020-11-24 PROCEDURE — 52204 CYSTOSCOPY W/BIOPSY(S): CPT | Performed by: UROLOGY

## 2020-11-24 PROCEDURE — 88112 CYTOPATH CELL ENHANCE TECH: CPT | Mod: GC | Performed by: PATHOLOGY

## 2020-11-24 RX ORDER — LIDOCAINE HYDROCHLORIDE 20 MG/ML
JELLY TOPICAL ONCE
Status: COMPLETED | OUTPATIENT
Start: 2020-11-24 | End: 2020-11-24

## 2020-11-24 RX ORDER — CIPROFLOXACIN 500 MG/1
500 TABLET, FILM COATED ORAL ONCE
Qty: 1 TABLET | Refills: 0 | Status: SHIPPED | OUTPATIENT
Start: 2020-11-24 | End: 2020-11-24

## 2020-11-24 RX ADMIN — LIDOCAINE HYDROCHLORIDE: 20 JELLY TOPICAL at 15:22

## 2020-11-24 ASSESSMENT — MIFFLIN-ST. JEOR: SCORE: 1385.78

## 2020-11-24 ASSESSMENT — PAIN SCALES - GENERAL: PAINLEVEL: NO PAIN (0)

## 2020-11-24 NOTE — LETTER
2020       RE: Max Zuñiga  [address]  [place] [address]     Dear Colleague,    Thank you for referring your patient, Max Zuñiga, to the [facility] UROLOGY CLINIC [place] at [facility]. Please see a copy of my visit note below.        CYSTOSCOPY PROCEDURE NOTE    Reason for cystoscopy:    Max Zuñiga is a very pleasant 81 yo M with hx of bladder cancer and LUTS.     Pathology: high grade pT1 (11/15/17)  Most recent resection: 17: no evidence of malignancy  Last surveillance cysto: 2020  Last upper tract imagin2019 (no evidence of urinary tract malignancy or upper tract disease)  Last cytology:  (negative)    CYSTOSCOPY  After obtaining informed consent, the patient was prepped and draped in the standard sterile fashion.  The 15 Belgian flexible cystoscope was inserted through the urethral meatus.      The anterior urethra was:  normal without stricture.    The external sphincter was  appropriately coapted.   The prostatic urethra demonstrated bilobar hypertrophy.    The bladder neck was tight and circumferentially occlusive with suggestion of a slight contracture.    The bladder was notable for mild trabeculation.  On the right lateral wall there was a patch of erythema that looked atypical.  The ureteral orifices  were identified on each side in orthotopic position with efflux of clear urine.     On retroflexion there was the usual bladder neck hyperemia.    There was mild intravesical protrusion of the prostate.      The shared decision was made to proceed with a biopsy of the atypical area in the bladder.  We proceeded to take two small biopsies using the flexible biopsy probe.  We fulgurated the base with a bugbee.  Excellent hemostasis was obtained.       The patient tolerated the procedure well without complication.      A post-void residual was slightly elevated but came down to below 300 on double void.  He was  asymptomatic from this.      Assessment/Plan: 79 yo with hx of bladder cancer  -Await pathology prior to next step in decision making  -Resume flomax  -Monitor PVR's    IEliceo saw and evaluated this patient and agree with the plan as stated above.  I personally performed all listed procedures.

## 2020-11-24 NOTE — PATIENT INSTRUCTIONS
Please schedule cystoscopy in 6 months with Dr. Hester.     It was a pleasure meeting with you today.  Thank you for allowing me and my team the privilege of caring for you today.  YOU are the reason we are here, and I truly hope we provided you with the excellent service you deserve.  Please let us know if there is anything else we can do for you so that we can be sure you are leaving completely satisfied with your care experience.

## 2020-11-24 NOTE — PROGRESS NOTES
CYSTOSCOPY PROCEDURE NOTE    Reason for cystoscopy:    Max Zuñiga is a very pleasant 81 yo M with hx of bladder cancer and LUTS.     Pathology: high grade pT1 (11/15/17)  Most recent resection: 17: no evidence of malignancy  Last surveillance cysto: 2020  Last upper tract imagin2019 (no evidence of urinary tract malignancy or upper tract disease)  Last cytology:  (negative)    CYSTOSCOPY  After obtaining informed consent, the patient was prepped and draped in the standard sterile fashion.  The 15 Zimbabwean flexible cystoscope was inserted through the urethral meatus.      The anterior urethra was:  normal without stricture.    The external sphincter was  appropriately coapted.   The prostatic urethra demonstrated bilobar hypertrophy.    The bladder neck was tight and circumferentially occlusive with suggestion of a slight contracture.    The bladder was notable for mild trabeculation.  On the right lateral wall there was a patch of erythema that looked atypical.  The ureteral orifices  were identified on each side in orthotopic position with efflux of clear urine.     On retroflexion there was the usual bladder neck hyperemia.    There was mild intravesical protrusion of the prostate.      The shared decision was made to proceed with a biopsy of the atypical area in the bladder.  We proceeded to take two small biopsies using the flexible biopsy probe.  We fulgurated the base with a bugbee.  Excellent hemostasis was obtained.       The patient tolerated the procedure well without complication.      A post-void residual was slightly elevated but came down to below 300 on double void.  He was asymptomatic from this.      Assessment/Plan: 81 yo with hx of bladder cancer  -Await pathology prior to next step in decision making  -Resume flomax  -Monitor PVR's    IEliceo saw and evaluated this patient and agree with the plan as stated above.  I personally performed all listed  procedures.

## 2020-11-24 NOTE — NURSING NOTE
"Chief Complaint   Patient presents with     Cystoscopy     BPH       Blood pressure (!) 165/94, pulse 60, height 1.753 m (5' 9\"), weight 68 kg (150 lb). Body mass index is 22.15 kg/m .    Patient Active Problem List   Diagnosis     Malignant neoplasm of overlapping sites of bladder (H)     Chondromalacia of patella     Hammer toes of both feet     Impulse control disorder     Internal derangement of knee     Laryngopharyngeal reflux disease     Lattice degeneration of peripheral retina     Low back pain with radiation     Personal history of other malignant neoplasm of skin     Pseudophakia of left eye     Secondary localized osteoarthrosis, lower leg     Senile nuclear sclerosis     Right inguinal hernia     Sensorineural hearing loss       No Known Allergies    Current Outpatient Medications   Medication Sig Dispense Refill     acetaminophen (TYLENOL) 325 MG tablet Take 325 mg by mouth       amLODIPine (NORVASC) 5 MG tablet Take 5 mg by mouth       brimonidine (ALPHAGAN) 0.2 % ophthalmic solution        colchicine 0.6 MG tablet 1.2 mg at the first sign of flare, followed in 1 hour with a single dose of 0.6 mg (maximum: 1.8 mg within 1 hour)       indomethacin (INDOCIN) 25 MG capsule Take 25 mg by mouth       Magnesium Oxide 250 MG TABS Take 250 mg by mouth       MAGNESIUM OXIDE PO Take 1 tablet by mouth daily        Multiple vitamin TABS Take 1 tablet by mouth every morning        neomycin-polymyxin-dexamethasone (MAXITROL) 3.5-23171-4.1 ophthalmic ointment 0.5 inches       OMEPRAZOLE PO Take 20 mg by mouth daily       prednisoLONE acetate (PRED FORTE) 1 % ophthalmic suspension 1 drop       PREVIDENT 5000 BOOSTER PLUS 1.1 % PSTE PLACE PEA SIZE AMOUNT ON TOOTHBRUSH  TWICE DAILY IN PLACE OF REGULAR TOOTHPASTE       SF 5000 PLUS 1.1 % CREA PLACE PEA SIZED AMOUNT ON TOOTHBRUSH. U 2 TIMES  PER DAY IN PLACE OF REGULAR PASTE.  0     tamsulosin (FLOMAX) 0.4 MG capsule Take 1 capsule (0.4 mg) by mouth daily 90 capsule 4 "     timolol maleate (TIMOPTIC) 0.5 % ophthalmic solution 1 drop         Social History     Tobacco Use     Smoking status: Former Smoker     Packs/day: 2.00     Years: 20.00     Pack years: 40.00     Types: Cigarettes     Smokeless tobacco: Never Used     Tobacco comment: quit in    Substance Use Topics     Alcohol use: Yes     Comment: 2 beers daily     Drug use: No       Brittany SmallwoodKYLER  2020  2:56 PM     Invasive Procedure Safety Checklist:    Procedure: Cystoscopy     Action: Complete sections and checkboxes as appropriate.    Pre-procedure:  1. Patient ID Verified with 2 identifiers (Arely and  or MRN) : YES    2. Procedure and site verified with patient/designee (when able) : YES    3. Accurate consent documentation in medical record : YES    4. H&P (or appropriate assessment) documented in medical record : YES  H&P must be up to 30 days prior to procedure an updated within 24 hours of                 Procedure as applicable.     5. Relevant diagnostic and radiology test results appropriately labeled and displayed as applicable : YES    6. Blood products, implants, devices, and/or special equipment available for the procedure as applicable : YES    7. Procedure site(s) marked with provider initials [Exclusions: None] : NO    8. Marking not required. Reason : Yes  Procedure does not require site marking    Time Out:     Time-Out performed immediately prior to starting procedure, including verbal and active participation of all team members addressing: YES    1. Correct patient identity.  2. Confirmed that the correct side and site are marked.  3. An accurate procedure to be done.  4. Agreement on the procedure to be done.  5. Correct patient position.  6. Relevant images and results are properly labeled and appropriately displayed.  7. The need to administer antibiotics or fluids for irrigation purposes during the procedure as applicable.  8. Safety precautions based on patient history or  medication use.    During Procedure: Verification of correct person, site, and procedure occurs any time the responsibility for care of the patient is transferred to another member of the care team.      The following medication was given:     MEDICATION:  Uro-jet  ROUTE: Urethral  SITE: Urethra  DOSE: 10 mL 2% lidocaine  LOT #: [id number]  : [employer]  EXPIRATION DATE: 8/22  NDC#: [address]-94   Was there drug waste? No    Prior to administration, verified patient identity using patient's name and date of birth.  Due to administration, patient instructed to remain in clinic for 15 minutes  afterwards, and to report any adverse reaction to me immediately.      Drug Amount Wasted:  None.  Vial/Syringe: Single dose vial    Brittany Smallwood CMA  November 24, 2020

## 2020-11-24 NOTE — NURSING NOTE
"No chief complaint on file.      Pulse 60, height 1.753 m (5' 9\"), weight 68 kg (150 lb). Body mass index is 22.15 kg/m .    Patient Active Problem List   Diagnosis     Malignant neoplasm of overlapping sites of bladder (H)     Chondromalacia of patella     Hammer toes of both feet     Impulse control disorder     Internal derangement of knee     Laryngopharyngeal reflux disease     Lattice degeneration of peripheral retina     Low back pain with radiation     Personal history of other malignant neoplasm of skin     Pseudophakia of left eye     Secondary localized osteoarthrosis, lower leg     Senile nuclear sclerosis     Right inguinal hernia     Sensorineural hearing loss       No Known Allergies    Current Outpatient Medications   Medication Sig Dispense Refill     acetaminophen (TYLENOL) 325 MG tablet Take 325 mg by mouth       amLODIPine (NORVASC) 5 MG tablet Take 5 mg by mouth       brimonidine (ALPHAGAN) 0.2 % ophthalmic solution        colchicine 0.6 MG tablet 1.2 mg at the first sign of flare, followed in 1 hour with a single dose of 0.6 mg (maximum: 1.8 mg within 1 hour)       indomethacin (INDOCIN) 25 MG capsule Take 25 mg by mouth       Magnesium Oxide 250 MG TABS Take 250 mg by mouth       MAGNESIUM OXIDE PO Take 1 tablet by mouth daily        Multiple vitamin TABS Take 1 tablet by mouth every morning        neomycin-polymyxin-dexamethasone (MAXITROL) 3.5-70688-2.1 ophthalmic ointment 0.5 inches       OMEPRAZOLE PO Take 20 mg by mouth daily       prednisoLONE acetate (PRED FORTE) 1 % ophthalmic suspension 1 drop       PREVIDENT 5000 BOOSTER PLUS 1.1 % PSTE PLACE PEA SIZE AMOUNT ON TOOTHBRUSH  TWICE DAILY IN PLACE OF REGULAR TOOTHPASTE       SF 5000 PLUS 1.1 % CREA PLACE PEA SIZED AMOUNT ON TOOTHBRUSH. U 2 TIMES  PER DAY IN PLACE OF REGULAR PASTE.  0     tamsulosin (FLOMAX) 0.4 MG capsule Take 1 capsule (0.4 mg) by mouth daily 90 capsule 4     timolol maleate (TIMOPTIC) 0.5 % ophthalmic solution 1 drop   "       Social History     Tobacco Use     Smoking status: Former Smoker     Packs/day: 2.00     Years: 20.00     Pack years: 40.00     Types: Cigarettes     Smokeless tobacco: Never Used     Tobacco comment: quit in    Substance Use Topics     Alcohol use: Yes     Comment: 2 beers daily     Drug use: No       Invasive Procedure Safety Checklist:    Procedure: Cystoscopy    Action: Complete sections and checkboxes as appropriate.    Pre-procedure:  1. Patient ID Verified with 2 identifiers (Arely and  or MRN) : YES    2. Procedure and site verified with patient/designee (when able) : YES    3. Accurate consent documentation in medical record : YES    4. H&P (or appropriate assessment) documented in medical record : N/A  H&P must be up to 30 days prior to procedure an updated within 24 hours of                 Procedure as applicable.     5. Relevant diagnostic and radiology test results appropriately labeled and displayed as applicable : YES    6. Blood products, implants, devices, and/or special equipment available for the procedure as applicable : YES    7. Procedure site(s) marked with provider initials [Exclusions: none] : NO    8. Marking not required. Reason : Yes  Procedure does not require site marking    Time Out:     Time-Out performed immediately prior to starting procedure, including verbal and active participation of all team members addressing: YES    1. Correct patient identity.  2. Confirmed that the correct side and site are marked.  3. An accurate procedure to be done.  4. Agreement on the procedure to be done.  5. Correct patient position.  6. Relevant images and results are properly labeled and appropriately displayed.  7. The need to administer antibiotics or fluids for irrigation purposes during the procedure as applicable.  8. Safety precautions based on patient history or medication use.    During Procedure: Verification of correct person, site, and procedure occurs any time the  responsibility for care of the patient is transferred to another member of the care team.    The following medication was given:     MEDICATION: Lidocaine Uro-Jet 2% 200mg (20mg/mL)  ROUTE: Urethral   SITE: Urethra   DOSE: 10mL  LOT #: Unavailable   : [employer]   EXPIRATION DATE: Unavailable  NDC#: [address]-16   Was there drug waste? No    Prior to injection, verified patient identity using patient's name and date of birth.  Due to injection administration, patient instructed to remain in clinic for 15 minutes  afterwards, and to report any adverse reaction to me immediately.    Drug Amount Wasted:  None.  Vial/Syringe: Single dose vial      ALBERTO Elkins  11/24/2020  2:47 PM

## 2020-11-25 LAB — COPATH REPORT: NORMAL

## 2020-11-27 LAB — COPATH REPORT: NORMAL

## 2021-01-15 ENCOUNTER — HEALTH MAINTENANCE LETTER (OUTPATIENT)
Age: 81
End: 2021-01-15

## 2021-10-24 ENCOUNTER — HEALTH MAINTENANCE LETTER (OUTPATIENT)
Age: 81
End: 2021-10-24

## 2022-02-13 ENCOUNTER — HEALTH MAINTENANCE LETTER (OUTPATIENT)
Age: 82
End: 2022-02-13

## 2022-10-16 ENCOUNTER — HEALTH MAINTENANCE LETTER (OUTPATIENT)
Age: 82
End: 2022-10-16

## 2023-01-02 ENCOUNTER — OFFICE VISIT (OUTPATIENT)
Dept: NEUROLOGY | Facility: CLINIC | Age: 83
End: 2023-01-02
Payer: COMMERCIAL

## 2023-01-02 DIAGNOSIS — F03.A3 MILD DEMENTIA WITH MOOD DISTURBANCE, UNSPECIFIED DEMENTIA TYPE (H): Primary | ICD-10-CM

## 2023-01-02 NOTE — PROGRESS NOTES
Patient was seen for neuropsychological evaluation at the request of Dr. Juana Nicole, for the purposes of diagnostic clarification and treatment planning.  2 hrs 6 min of test administration and scoring were provided by this writer, Julissa Bedoya.  Please see Dr. Max Monroy's report for a full interpretation of the findings.

## 2023-01-02 NOTE — LETTER
1/2/2023       RE: Max Zuñiga  [address]  [facility] [address]     Dear Colleague,    Thank you for referring your patient, Max Zuñiga, to the [facility] at [facility]. Please see a copy of my visit note below.    Patient was seen for neuropsychological evaluation at the request of Dr. Juana Nicole, for the purposes of diagnostic clarification and treatment planning.  2 hrs 6 min of test administration and scoring were provided by this writer, Julissa Bedoya.  Please see Dr. Max Monroy's report for a full interpretation of the findings.      Adult Neuropsychology Clinic  [facility]      NEUROPSYCHOLOGICAL EVALUATION    RELEVANT HISTORY AND REASON FOR REFERRAL    This is a report of neuropsychological consultation regarding Max Zuñiga (Jim), an 82-year-old, right-handed, White man with 16 years of formal education. There have been cognitive changes noticed for several years. He had baseline neuropsychological testing with Dr. Nancy Matt at [facility] on 4/5/2016, with about 3 years of noticeable issues at that time. Screening with the [facility] was normal at 30/30. Dr. Matt s report states that his performances on broader cognitive testing were normal and in keeping with an estimated well above average baseline. He came to clinical attention for memory issues again in June 2021. At that time, there was concern for changes after a surgery (radical cystoprostatectomy with bilateral pelvic lymphadenectomy and creation of ileal conduit urinary diversion and appendectomy) that March. Screening with the [facility] was still at 30/30. Brain MRI on 7/14/2021 showed evidence of medial temporal lobe atrophy and mild small vessel ischemic disease. Presently, he is referred for neuropsychological evaluation by Dr. Juana Beaver of [facility] aside from the referral request that listed  a diagnosis of mild cognitive impairment (MCI). Family history includes dementia and a stroke for his mother. She had 6-7 years of symptoms, required skilled nursing care, and  at 92. His sister had a stroke. His father  of tuberculosis at 47. A daughter was given an ADHD diagnosis. There is no history of serious psychiatric conditions. Mr. Zuñiga s relevant medical history includes impulse control disorder (primarily anger-related), bilateral sensorineural hearing loss, left eye pseudophakia, glaucoma, hypertension, and bladder cancer. Per [facility] records, his current medications include omeprazole, magnesium, acetaminophen, and amlodipine.     Mr. Zuñiga arrived an hour late for today s appointment. He was apparently confused about the location of the appointment and unable to figure out how to find this. He ended up going home. His daughter, Eileen, lives a few blocks away from him, and she was able to bring him in today.    Mr. Zuñiga tells me that he has noticed cognitive troubles for perhaps 3 years. He describes primary issues with cognitive speed and some memory concerns. His daughter describes prominent word-finding lapses, reduced abilities with technology (e.g., how to operate his cell phone), and reduced problem-solving skills. She says the family has noticed a substantial acceleration in his decline over the last 6 months. 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     He lives at home with his wife. They have been  since . Both of their daughters live here in the [place]. [place] are about to sell [place] home and [place] They will start off in the independent living wing, and there are graded options for assisted living and memory care as needed. He tells me that his wife has done the household financial management for the last 30 years. He takes his  medications on his own. He is unable to describe what most of his medications are for. He says he takes 4 pills every morning and one of them is B12. His daughter reminds him that one is for hypertension. He is still driving. As noted, he was unable to figure out how to get to our clinic on his own today.    There is no history of stroke, seizure, or TBI. He describes having a little bit of trouble with walking and balance, such that he might slip on the stairs at times. He says there have been no significant injuries or other falls. He says that his hands feel  buzzy,  and his daughter describes a slight bilateral tremor, which is visible today. He also says that there has been pain in his fingertips for about the last 6 months.    He has hearing aids but does not usually use them. He had them in for today s appointment. He has not noticed any changes in his senses of smell or taste. There have been visual abnormalities. He describes seeing phosphenes at least a couple of times per week, as well as a daily hallucinatory experience of seeing something that is large and reddish floating quickly across his visual field. He describes it like an elongated ball of fire but also like a living being. He says it occurs at least once a day. He has not had any well-formed people or animals appear in his visual field. He has not had any auditory hallucinations. He does not endorse any experiences with reduplicative paramnesia, abnormal sense of presence, or derealization/depersonalization.    His daughter says that he can go for days without eating and that they kind of have to force him to eat. He has lost about 50 pounds over the last 5 years, unintentionally.    He is sleeping much more than usual, on the order of 10-12 hours/day. He says that his overnight sleep is usually pretty good. Last night s sleep was apparently quite poor, with anticipatory anxiety regarding this appointment. He has restless leg syndrome. They do  not endorse any indications of REM sleep behavior disorder.    He does not describe problems with mood. However, his daughter indicates she would like to see a trial of an antidepressant, as he just sits at home all day, inactive. As noted, he has a history of impulse control disorder primarily presenting around anger. His daughter says that his anger issues surfaced this morning when he was lost and confused trying to get here. He thinks there was some engagement in psychotherapy in the past, perhaps on the order of a decade ago. He says he has never had any history of treatment with psychiatric medications. He describes anxiety as only a situational problem, not a generalized problem. He denies any suicidal ideation.    Regarding alcohol habits, he tells me that he will  try to do no more than 2 drinks per day,  but that sometimes it is more than that. He used to smoke but quit in 1979. He reports no use of illicit drugs.    As noted above, at the 2016 neuropsychological evaluation his baseline cognitive abilities were estimated to be well above average. He tells me he was always a very good student in school, at least through high school. He attended college but tended to skip his classes. He then went into the [employer] for 3 years. He later went back to college at the [employer] and earned a degree in comparative literature. His career was in the [email or web address]. 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 He retired at [age] years old.    BEHAVIORAL OBSERVATIONS    Mr. Zuñiga was polite and cooperative with the evaluation. He had a mildly dysphoric demeanor. As noted, he arrived 60 minutes late because of a series of mistakes trying to get here. In the interview, he was markedly dysnomic. He was a limited historian and demonstrated reduced fund of knowledge. He struggled with timelines for recent and remote events. He demonstrated limited capacity to perform mental math during the  interview. During the testing session, his voice was somewhat soft or weak. He was mildly slowed in making responses. He was alert, attentive, and engaged. Comprehension and retention of test instructions was appropriate. His effort and persistence were good. The test results are seen as valid estimations of his cognitive capacity.     MEASURES ADMINISTERED    The following measures were administered by a trained psychometrist, under my supervision:    Orientation: Time, Place, Basic Personal Information, Recent US Presidents; Wechsler Adult Intelligence Scales-IV: Similarities, Block Design, Digit Span, Coding; Controlled Oral Word Association Test; Animal Naming Test; Wingate Naming Test; Alena Visual Acuity Screen; Clock Drawing; Grooved Pegboard; Trail Making Test; Wisconsin Card Sorting Test; Seng-Osterrieth Complex Figure Test; Beckett Verbal Learning Test, Revised; Brief Visuospatial Memory Test, Revised; Wechsler Memory Scale-IV: Logical Memory; Geriatric Depression Scale.    RESULTS AND INTERPRETATION    Orientation: Orientation to time was highly abnormal, stating the current year as 2031. He was unable to demonstrate appropriate orientation to place. Orientation to basic personal information was normal, though he did struggle to recall his date of birth. He was able to name 3 of the 6 most recent [place] presidents and provide a cursory description of a fourth.     Language & Related Skills: Abstract verbal analogical reasoning was low average. Confrontation naming was low average. Letter-based verbal fluency was average for total correct responses but had 6 set-loss errors. Category-based verbal fluency was below average.    Visual Perceptual & Constructional Skills: Binocular, corrected, near-point visual acuity was 20/25 on Alena screening. Visuospatial reasoning through hands-on object assembly was average. Clock drawing was abnormal, being unable to figure out how to place hands on the clock.  Copying a complex geometric figure was below average.     Motor Skills: Speeded fine-motor dexterity for placing shaped pegs into holes was average for his age and showed no meaningful differences between the right and left hands.     Mental Speed & Executive Functioning: As noted above, he had 6 set-loss errors on the letter fluency task and category fluency was below average. Cognitive processing speed was average on a timed transcription task. Visual scanning and graphomotor sequencing under simple conditions was low average for speed and had no errors. Scanning and sequencing under greater executive demands to control divided attention was so deficient for both speed and errors that the test had to be discontinued. Conceptualization, inductive reasoning, and ability to maintain on-task responding were below expectations on an ambiguous card-sorting task.     Attention & Working Memory: Immediate auditory attention and working memory were average for repeating and rearranging digit strings.     Learning & Anterograde Memory: Immediate verbal memory for short stories was average, and delayed story recall was average. Delayed recognition of story details was high average. Learning a word list over repeated readings was low average. Delayed free recall of the list was low average, and delayed recognition of the list was average. A few minutes after the initial copy, incidental free recall of the complex figure was low average. On another test, immediate memory for simple designs was low average. Delayed recall of the simple designs was low average, and recognition of them was average.     Emotional Functioning: On a brief self-report inventory, he endorsed borderline to mild symptoms related to depression and anxiety (GDS = 9/30). Endorsements included dropping activities and interests, not having hope for the future, preferring to be at home rather than trying new things, having trouble starting projects, not  having energy, frequently getting upset over little things, having memory trouble, having trouble concentrating, and his mind not feeling as clear as it used to be.     IMPRESSIONS    The neuropsychological results are abnormal. Compared to baseline testing in 2016, there has been significant decline. At baseline, most performances were above average. Today, several performances remain average/low average but are at least a standard deviation lower, and there are multiple tests that fall into below average ranges. The primary area of deficit is in executive functioning (e.g., flexible thinking, abstraction, novel problem solving, complex attentional control). Orientation is lower than expected. Visuoconstructional abilities are below normal expectations and well below levels in 2016. There has also been a significant decline in naming/lexical retrieval. Learning and memory performances are low average to average and mildly lower than at baseline.     There are indications of borderline to mild mood issues, but emotional factors do not explain the cognitive deficits.     The data implicate cerebral dysfunction that is maximal among frontal lobe systems and secondarily among lateral temporal and medial temporal systems. By base rates alone, the most likely cause would be Alzheimer s disease. The profile and clinical history could also fit with that. Aside from possible visual hallucinations, he does not show the cardinal features of Lewy body disease, and hallucinations can certainly be present in other neurodegenerative diseases. Neuroimaging in July 2021 did not show indications of hydrocephalus or severe cerebrovascular compromise. Aside from neurodegenerative concerns, there could be a secondary contribution from higher than average alcohol habits (14+ drinks/week).     Mr. Zuñiga still has several abilities that are average for his age. His well above average baseline confers substantial cognitive reserve. With  today s cognitive data, it is not surprising that he is able to get though many routine daily tasks okay but struggles heavily with novel situations (e.g., tried but was unable to get to today s appointment on his own). His family reports a notable acceleration in decline over the last 6 months. I think he is in a transition zone, moving from mild cognitive impairment (MCI) into a stage of early/mild dementia.     RECOMMENDATIONS    I was able to meet with Mr. Zuñiga and his daughter after the testing session, to discuss results and recommendations.     1. They should follow up with Dr. Beaver about these results, to discuss treatment options and any additional workups to narrow the diagnostic differential.   2. I am concerned about driving safety. I think he should refrain from driving unless he is able to pass a formal driving evaluation.   3. The planned move to a senior living facility with graded support options is appropriate.   4. An Occupational Therapy evaluation of daily living skills (e.g., CPT) is encouraged, to see what types of daily oversight might be necessary.   5. He should talk to Dr. Beaver or his primary care provider about his diminished appetite, unintentional weight loss, and increased amounts of sleeping.   6. He should wear his hearing aids faithfully.   7. Consideration could be given to a trial of a medication for mood symptoms.   8. I strongly encourage minimizing his alcohol habits.   9. He should endeavor to remain as physically healthy and active as possible.   10. Longitudinal monitoring of cognition is advised. I would like to see him again in approximately 12 months.     Max Monroy, PhD, LP, ABPP-CN  Board Certified in Clinical Neuropsychology  Licensed Psychologist [id number]      Time spent: One unit psychiatric evaluation including records review, interview, and clinical assessment licensed and board-certified neuropsychologist (CPT 87557). 158 minutes  neuropsychological testing evaluation by licensed and board-certified neuropsychologist, including integration of patient data, interpretation of standardized test results and clinical data, clinical decision-making, treatment planning, report, and interactive feedback to the patient (CPT 34333, 51384). 126 minutes of psychological and neuropsychological test administration and scoring by technician (CPT 90167, 03698). Diagnoses: F03.A3

## 2023-01-02 NOTE — LETTER
1/2/2023       RE: Max Zuñiga  [address]  [facility] [id number]     Dear Colleague,    Thank you for referring your patient, Max Zuñiga, to the [facility] at [facility]. Please see a copy of my visit note below.    Patient was seen for neuropsychological evaluation at the request of Dr. Juana Nicole, for the purposes of diagnostic clarification and treatment planning.  2 hrs 6 min of test administration and scoring were provided by this writer, Julissa Bedoya.  Please see Dr. Max Monroy's report for a full interpretation of the findings.      Adult Neuropsychology Clinic  [facility]      NEUROPSYCHOLOGICAL EVALUATION    RELEVANT HISTORY AND REASON FOR REFERRAL    This is a report of neuropsychological consultation regarding Max Zuñiga (Jim), an 82-year-old, right-handed, White man with 16 years of formal education. There have been cognitive changes noticed for several years. He had baseline neuropsychological testing with Dr. Nancy Matt at [facility] on 4/5/2016, with about 3 years of noticeable issues at that time. Screening with the [facility] was normal at 30/30. Dr. Matt s report states that his performances on broader cognitive testing were normal and in keeping with an estimated well above average baseline. He came to clinical attention for memory issues again in June 2021. At that time, there was concern for changes after a surgery (radical cystoprostatectomy with bilateral pelvic lymphadenectomy and creation of ileal conduit urinary diversion and appendectomy) that March. Screening with the [facility] was still at 30/30. Brain MRI on 7/14/2021 showed evidence of medial temporal lobe atrophy and mild small vessel ischemic disease. Presently, he is referred for neuropsychological evaluation by Dr. Juana Beaver of [facility]. [facility] aside from the referral request that listed  a diagnosis of mild cognitive impairment (MCI). Family history includes dementia and a stroke for his mother. She had 6-7 years of symptoms, required skilled nursing care, and  at 92. His sister had a stroke. His father  of tuberculosis at 47. A daughter was given an ADHD diagnosis. There is no history of serious psychiatric conditions. Mr. Zuñiga s relevant medical history includes impulse control disorder (primarily anger-related), bilateral sensorineural hearing loss, left eye pseudophakia, glaucoma, hypertension, and bladder cancer. Per [facility] records, his current medications include omeprazole, magnesium, acetaminophen, and amlodipine.     Mr. Zuñiga arrived an hour late for today s appointment. He was apparently confused about the location of the appointment and unable to figure out how to find this. He ended up going home. His daughter, Eileen, lives a few blocks away from him, and she was able to bring him in today.    Mr. Zuñiga tells me that he has noticed cognitive troubles for perhaps 3 years. He describes primary issues with cognitive speed and some memory concerns. His daughter describes prominent word-finding lapses, reduced abilities with technology (e.g., how to operate his cell phone), and reduced problem-solving skills. She says the family has noticed a substantial acceleration in his decline over the last 6 months. 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     He lives at home with his wife. They have been  since . Both of their daughters live here in the [place]. [place] are about to sell [place] home and [place] They will start off in the independent living wing, and there are graded options for assisted living and memory care as needed. He tells me that his wife has done the household financial management for the last 30 years. He takes his  medications on his own. He is unable to describe what most of his medications are for. He says he takes 4 pills every morning and one of them is B12. His daughter reminds him that one is for hypertension. He is still driving. As noted, he was unable to figure out how to get to our clinic on his own today.    There is no history of stroke, seizure, or TBI. He describes having a little bit of trouble with walking and balance, such that he might slip on the stairs at times. He says there have been no significant injuries or other falls. He says that his hands feel  buzzy,  and his daughter describes a slight bilateral tremor, which is visible today. He also says that there has been pain in his fingertips for about the last 6 months.    He has hearing aids but does not usually use them. He had them in for today s appointment. He has not noticed any changes in his senses of smell or taste. There have been visual abnormalities. He describes seeing phosphenes at least a couple of times per week, as well as a daily hallucinatory experience of seeing something that is large and reddish floating quickly across his visual field. He describes it like an elongated ball of fire but also like a living being. He says it occurs at least once a day. He has not had any well-formed people or animals appear in his visual field. He has not had any auditory hallucinations. He does not endorse any experiences with reduplicative paramnesia, abnormal sense of presence, or derealization/depersonalization.    His daughter says that he can go for days without eating and that they kind of have to force him to eat. He has lost about 50 pounds over the last 5 years, unintentionally.    He is sleeping much more than usual, on the order of 10-12 hours/day. He says that his overnight sleep is usually pretty good. Last night s sleep was apparently quite poor, with anticipatory anxiety regarding this appointment. He has restless leg syndrome. They do  not endorse any indications of REM sleep behavior disorder.    He does not describe problems with mood. However, his daughter indicates she would like to see a trial of an antidepressant, as he just sits at home all day, inactive. As noted, he has a history of impulse control disorder primarily presenting around anger. His daughter says that his anger issues surfaced this morning when he was lost and confused trying to get here. He thinks there was some engagement in psychotherapy in the past, perhaps on the order of a decade ago. He says he has never had any history of treatment with psychiatric medications. He describes anxiety as only a situational problem, not a generalized problem. He denies any suicidal ideation.    Regarding alcohol habits, he tells me that he will  try to do no more than 2 drinks per day,  but that sometimes it is more than that. He used to smoke but quit in 1979. He reports no use of illicit drugs.    As noted above, at the 2016 neuropsychological evaluation his baseline cognitive abilities were estimated to be well above average. He tells me he was always a very good student in school, at least through high school. He attended college but tended to skip his classes. He then went into the [employer] for 3 years. He later went back to college at the [employer] and earned a degree in comparative literature. His career was in the [employer]. 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 He retired at [age] years old.    BEHAVIORAL OBSERVATIONS    Mr. Zuñiga was polite and cooperative with the evaluation. He had a mildly dysphoric demeanor. As noted, he arrived 60 minutes late because of a series of mistakes trying to get here. In the interview, he was markedly dysnomic. He was a limited historian and demonstrated reduced fund of knowledge. He struggled with timelines for recent and remote events. He demonstrated limited capacity to perform mental math during the  interview. During the testing session, his voice was somewhat soft or weak. He was mildly slowed in making responses. He was alert, attentive, and engaged. Comprehension and retention of test instructions was appropriate. His effort and persistence were good. The test results are seen as valid estimations of his cognitive capacity.     MEASURES ADMINISTERED    The following measures were administered by a trained psychometrist, under my supervision:    Orientation: Time, Place, Basic Personal Information, Recent [place] Presidents; Wechsler Adult Intelligence Scales-IV: Similarities, Block Design, Digit Span, Coding; Controlled Oral Word Association Test; Animal Naming Test; [place] Naming Test; Alena Visual Acuity Screen; Clock Drawing; Grooved Pegboard; Trail Making Test; [place] Card Sorting Test; Seng-Osterrieth Complex Figure Test; Beckett Verbal Learning Test, Revised; Brief Visuospatial Memory Test, Revised; Wechsler Memory Scale-IV: Logical Memory; Geriatric Depression Scale.    RESULTS AND INTERPRETATION    Orientation: Orientation to time was highly abnormal, stating the current year as 2031. He was unable to demonstrate appropriate orientation to place. Orientation to basic personal information was normal, though he did struggle to recall his date of birth. He was able to name 3 of the 6 most recent [place] presidents and provide a cursory description of a fourth.     Language & Related Skills: Abstract verbal analogical reasoning was low average. Confrontation naming was low average. Letter-based verbal fluency was average for total correct responses but had 6 set-loss errors. Category-based verbal fluency was below average.    Visual Perceptual & Constructional Skills: Binocular, corrected, near-point visual acuity was 20/25 on Alena screening. Visuospatial reasoning through hands-on object assembly was average. Clock drawing was abnormal, being unable to figure out how to place hands on the clock.  Copying a complex geometric figure was below average.     Motor Skills: Speeded fine-motor dexterity for placing shaped pegs into holes was average for his age and showed no meaningful differences between the right and left hands.     Mental Speed & Executive Functioning: As noted above, he had 6 set-loss errors on the letter fluency task and category fluency was below average. Cognitive processing speed was average on a timed transcription task. Visual scanning and graphomotor sequencing under simple conditions was low average for speed and had no errors. Scanning and sequencing under greater executive demands to control divided attention was so deficient for both speed and errors that the test had to be discontinued. Conceptualization, inductive reasoning, and ability to maintain on-task responding were below expectations on an ambiguous card-sorting task.     Attention & Working Memory: Immediate auditory attention and working memory were average for repeating and rearranging digit strings.     Learning & Anterograde Memory: Immediate verbal memory for short stories was average, and delayed story recall was average. Delayed recognition of story details was high average. Learning a word list over repeated readings was low average. Delayed free recall of the list was low average, and delayed recognition of the list was average. A few minutes after the initial copy, incidental free recall of the complex figure was low average. On another test, immediate memory for simple designs was low average. Delayed recall of the simple designs was low average, and recognition of them was average.     Emotional Functioning: On a brief self-report inventory, he endorsed borderline to mild symptoms related to depression and anxiety (GDS = 9/30). Endorsements included dropping activities and interests, not having hope for the future, preferring to be at home rather than trying new things, having trouble starting projects, not  having energy, frequently getting upset over little things, having memory trouble, having trouble concentrating, and his mind not feeling as clear as it used to be.     IMPRESSIONS    The neuropsychological results are abnormal. Compared to baseline testing in 2016, there has been significant decline. At baseline, most performances were above average. Today, several performances remain average/low average but are at least a standard deviation lower, and there are multiple tests that fall into below average ranges. The primary area of deficit is in executive functioning (e.g., flexible thinking, abstraction, novel problem solving, complex attentional control). Orientation is lower than expected. Visuoconstructional abilities are below normal expectations and well below levels in 2016. There has also been a significant decline in naming/lexical retrieval. Learning and memory performances are low average to average and mildly lower than at baseline.     There are indications of borderline to mild mood issues, but emotional factors do not explain the cognitive deficits.     The data implicate cerebral dysfunction that is maximal among frontal lobe systems and secondarily among lateral temporal and medial temporal systems. By base rates alone, the most likely cause would be Alzheimer s disease. The profile and clinical history could also fit with that. Aside from possible visual hallucinations, he does not show the cardinal features of Lewy body disease, and hallucinations can certainly be present in other neurodegenerative diseases. Neuroimaging in July 2021 did not show indications of hydrocephalus or severe cerebrovascular compromise. Aside from neurodegenerative concerns, there could be a secondary contribution from higher than average alcohol habits (14+ drinks/week).     Mr. Zuñiga still has several abilities that are average for his age. His well above average baseline confers substantial cognitive reserve. With  today s cognitive data, it is not surprising that he is able to get though many routine daily tasks okay but struggles heavily with novel situations (e.g., tried but was unable to get to today s appointment on his own). His family reports a notable acceleration in decline over the last 6 months. I think he is in a transition zone, moving from mild cognitive impairment (MCI) into a stage of early/mild dementia.     RECOMMENDATIONS    I was able to meet with Mr. Zuñiga and his daughter after the testing session, to discuss results and recommendations.     1. They should follow up with Dr. Beaver about these results, to discuss treatment options and any additional workups to narrow the diagnostic differential.   2. I am concerned about driving safety. I think he should refrain from driving unless he is able to pass a formal driving evaluation.   3. The planned move to a senior living facility with graded support options is appropriate.   4. An Occupational Therapy evaluation of daily living skills (e.g., CPT) is encouraged, to see what types of daily oversight might be necessary.   5. He should talk to Dr. Beaver or his primary care provider about his diminished appetite, unintentional weight loss, and increased amounts of sleeping.   6. He should wear his hearing aids faithfully.   7. Consideration could be given to a trial of a medication for mood symptoms.   8. I strongly encourage minimizing his alcohol habits.   9. He should endeavor to remain as physically healthy and active as possible.   10. Longitudinal monitoring of cognition is advised. I would like to see him again in approximately 12 months.     Max Monroy, PhD, LP, ABPP-CN  Board Certified in Clinical Neuropsychology  Licensed Psychologist [id number]      Time spent: One unit psychiatric evaluation including records review, interview, and clinical assessment licensed and board-certified neuropsychologist (CPT 15881). 158 minutes  neuropsychological testing evaluation by licensed and board-certified neuropsychologist, including integration of patient data, interpretation of standardized test results and clinical data, clinical decision-making, treatment planning, report, and interactive feedback to the patient (CPT 71508, [id number]). 126 minutes of psychological and neuropsychological test administration and scoring by technician (CPT 64081, 94689). Diagnoses: F03.A3

## 2023-01-03 PROBLEM — F03.A3 MILD DEMENTIA WITH MOOD DISTURBANCE, UNSPECIFIED DEMENTIA TYPE (H): Status: ACTIVE | Noted: 2023-01-03

## 2023-01-03 NOTE — PROGRESS NOTES
Name: Max Zuñiga MRN: [id number]  : 1940  CONNELLY: 2023  Staff: SARAI Tech: JACINTO Age: 82  Sex: Male Hand: Right Educ: 16  Vision: 20/25 ?with correction / ?without correction    ORIENTATION     Time  --61     Place  0 /2     Personal info          Presidents 3 /6    WAIS-IV   Raw SSa     Similarities  14 7     Block Design  29 11     Digit Span  23 10 RDS= 10     Coding  37 9    COWAT (CFL)     Raw: 29  SS: 9%ile: 29-40    ANIMAL NAMING TEST     Raw: 11  SS: 5 T: 33    BOSTON NAMING TEST     Raw: 45  SS: 8 %ile: 19-28    CLOCK DRAWING: Impaired    GROOVED PEGBOARD   Raw  Drops SS T     RH  111  1 4 45       1 4 45    TRAILS Raw  Err SS %ile     A 64  0 7 11-18      B   5 2 <1     WCST (64 cards) Raw %ile     Categories: 1 11-16     Total Correct: 45     Total Errors: 19 87     Persev. Err.: 11 92     Concept. Resp.:42 96     FTMS:  4     LTL:  N/A     MECHE-O    Raw    T %ile     Time to Copy  234      >16     Copy    23.5     2-5     Short Delay Recall 5.0 41 18    WMS-IV  Raw SS / %ile     LM I  22 8     LM II  10 9     LM Recog. 20 51st-75th    HVLT-R Form 4     Trial 1 2 3      2 7 8  Raw T     Total Learning (1-3) 17 39     Delayed Recall  5 40     Percent Retention 63 41     Recognition Hits/FP 12/1 54    BVMT-R Form 1     Trial 1 2 3      1 3 5  Raw  Z-Score     Total Learning (1-3) 9 -0.83     Delayed Recall  4 -0.74     Percent Retention 80      Recognition Hits/FP 6/0  0.67    GDS (30-item)     Raw:  9 Interpretation: Minimal

## 2023-01-03 NOTE — PROGRESS NOTES
Adult Neuropsychology Clinic  [facility]      NEUROPSYCHOLOGICAL EVALUATION    RELEVANT HISTORY AND REASON FOR REFERRAL    This is a report of neuropsychological consultation regarding Max Zuñiga (Jim), an 82-year-old, right-handed, White man with 16 years of formal education. There have been cognitive changes noticed for several years. He had baseline neuropsychological testing with Dr. Nancy Matt at [facility] on 2016, with about 3 years of noticeable issues at that time. Screening with the [facility] was normal at 30/. Dr. Matt s report states that his performances on broader cognitive testing were normal and in keeping with an estimated well above average baseline. He came to clinical attention for memory issues again in 2021. At that time, there was concern for changes after a surgery (radical cystoprostatectomy with bilateral pelvic lymphadenectomy and creation of ileal conduit urinary diversion and appendectomy) that March. Screening with the [facility] was still at 30/30. Brain MRI on 2021 showed evidence of medial temporal lobe atrophy and mild small vessel ischemic disease. Presently, he is referred for neuropsychological evaluation by Dr. Juana Beaver of [facility]. [facility] did not forward relevant records aside from the referral request that listed a diagnosis of mild cognitive impairment (MCI). Family history includes dementia and a stroke for his mother. She had 6-7 years of symptoms, required skilled nursing care, and  at 92. His sister had a stroke. His father  of tuberculosis at 47. A daughter was given an ADHD diagnosis. There is no history of serious psychiatric conditions. Mr. Zuñiga s relevant medical history includes impulse control disorder (primarily anger-related), bilateral sensorineural hearing loss, left eye pseudophakia, glaucoma, hypertension, and bladder cancer. Per [facility] records, his current medications include omeprazole, magnesium,  acetaminophen, and amlodipine.     Mr. Zuñiga arrived an hour late for today s appointment. He was apparently confused about the location of the appointment and unable to figure out how to find this. He ended up going home. His daughter, Eileen, lives a few blocks away from him, and she was able to bring him in today.    Mr. Zuñiga tells me that he has noticed cognitive troubles for perhaps 3 years. He describes primary issues with cognitive speed and some memory concerns. His daughter describes prominent word-finding lapses, reduced abilities with technology (e.g., how to operate his cell phone), and reduced problem-solving skills. She says the family has noticed a substantial acceleration in his decline over the last 6 months. 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     He lives at home with his wife. They have been  since 1967. Both of their daughters live here in the [place]. [place] are about to sell [place] home and [place] They will start off in the independent living wing, and there are graded options for assisted living and memory care as needed. He tells me that his wife has done the household financial management for the last 30 years. He takes his medications on his own. He is unable to describe what most of his medications are for. He says he takes 4 pills every morning and one of them is B12. His daughter reminds him that one is for hypertension. He is still driving. As noted, he was unable to figure out how to get to our clinic on his own today.    There is no history of stroke, seizure, or TBI. He describes having a little bit of trouble with walking and balance, such that he might slip on the stairs at times. He says there have been no significant injuries or other falls. He says that his hands feel  buzzy,  and his daughter describes a slight bilateral tremor, which  is visible today. He also says that there has been pain in his fingertips for about the last 6 months.    He has hearing aids but does not usually use them. He had them in for today s appointment. He has not noticed any changes in his senses of smell or taste. There have been visual abnormalities. He describes seeing phosphenes at least a couple of times per week, as well as a daily hallucinatory experience of seeing something that is large and reddish floating quickly across his visual field. He describes it like an elongated ball of fire but also like a living being. He says it occurs at least once a day. He has not had any well-formed people or animals appear in his visual field. He has not had any auditory hallucinations. He does not endorse any experiences with reduplicative paramnesia, abnormal sense of presence, or derealization/depersonalization.    His daughter says that he can go for days without eating and that they kind of have to force him to eat. He has lost about 50 pounds over the last 5 years, unintentionally.    He is sleeping much more than usual, on the order of 10-12 hours/day. He says that his overnight sleep is usually pretty good. Last night s sleep was apparently quite poor, with anticipatory anxiety regarding this appointment. He has restless leg syndrome. They do not endorse any indications of REM sleep behavior disorder.    He does not describe problems with mood. However, his daughter indicates she would like to see a trial of an antidepressant, as he just sits at home all day, inactive. As noted, he has a history of impulse control disorder primarily presenting around anger. His daughter says that his anger issues surfaced this morning when he was lost and confused trying to get here. He thinks there was some engagement in psychotherapy in the past, perhaps on the order of a decade ago. He says he has never had any history of treatment with psychiatric medications. He describes anxiety  as only a situational problem, not a generalized problem. He denies any suicidal ideation.    Regarding alcohol habits, he tells me that he will  try to do no more than 2 drinks per day,  but that sometimes it is more than that. He used to smoke but quit in 1979. He reports no use of illicit drugs.    As noted above, at the 2016 neuropsychological evaluation his baseline cognitive abilities were estimated to be well above average. He tells me he was always a very good student in school, at least through high school. He attended college but tended to skip his classes. He then went into the [employer] for 3 years. He later went back to college at the [employer] and earned a degree in [employer]. His career was in the [employer]. 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 He retired at [age] years old.    BEHAVIORAL OBSERVATIONS    Mr. Zuñiga was polite and cooperative with the evaluation. He had a mildly dysphoric demeanor. As noted, he arrived 60 minutes late because of a series of mistakes trying to get here. In the interview, he was markedly dysnomic. He was a limited historian and demonstrated reduced fund of knowledge. He struggled with timelines for recent and remote events. He demonstrated limited capacity to perform mental math during the interview. During the testing session, his voice was somewhat soft or weak. He was mildly slowed in making responses. He was alert, attentive, and engaged. Comprehension and retention of test instructions was appropriate. His effort and persistence were good. The test results are seen as valid estimations of his cognitive capacity.     MEASURES ADMINISTERED    The following measures were administered by a trained psychometrist, under my supervision:    Orientation: Time, Place, Basic Personal Information, Recent [place] Presidents; Wechsler Adult Intelligence Scales-IV: Similarities, Block Design, Digit Span, Coding; Controlled Oral Word  Association Test; Animal Naming Test; [place] Naming Test; Alena Visual Acuity Screen; Clock Drawing; Grooved Pegboard; Trail Making Test; [place] Card Sorting Test; Seng-Osterrieth Complex Figure Test; Beckett Verbal Learning Test, Revised; Brief Visuospatial Memory Test, Revised; Wechsler Memory Scale-IV: Logical Memory; Geriatric Depression Scale.    RESULTS AND INTERPRETATION    Orientation: Orientation to time was highly abnormal, stating the current year as 2031. He was unable to demonstrate appropriate orientation to place. Orientation to basic personal information was normal, though he did struggle to recall his date of birth. He was able to name 3 of the 6 most recent [place] presidents and provide a cursory description of a fourth.     Language & Related Skills: Abstract verbal analogical reasoning was low average. Confrontation naming was low average. Letter-based verbal fluency was average for total correct responses but had 6 set-loss errors. Category-based verbal fluency was below average.    Visual Perceptual & Constructional Skills: Binocular, corrected, near-point visual acuity was 20/25 on Alena screening. Visuospatial reasoning through hands-on object assembly was average. Clock drawing was abnormal, being unable to figure out how to place hands on the clock. Copying a complex geometric figure was below average.     Motor Skills: Speeded fine-motor dexterity for placing shaped pegs into holes was average for his age and showed no meaningful differences between the right and left hands.     Mental Speed & Executive Functioning: As noted above, he had 6 set-loss errors on the letter fluency task and category fluency was below average. Cognitive processing speed was average on a timed transcription task. Visual scanning and graphomotor sequencing under simple conditions was low average for speed and had no errors. Scanning and sequencing under greater executive demands to control divided attention  was so deficient for both speed and errors that the test had to be discontinued. Conceptualization, inductive reasoning, and ability to maintain on-task responding were below expectations on an ambiguous card-sorting task.     Attention & Working Memory: Immediate auditory attention and working memory were average for repeating and rearranging digit strings.     Learning & Anterograde Memory: Immediate verbal memory for short stories was average, and delayed story recall was average. Delayed recognition of story details was high average. Learning a word list over repeated readings was low average. Delayed free recall of the list was low average, and delayed recognition of the list was average. A few minutes after the initial copy, incidental free recall of the complex figure was low average. On another test, immediate memory for simple designs was low average. Delayed recall of the simple designs was low average, and recognition of them was average.     Emotional Functioning: On a brief self-report inventory, he endorsed borderline to mild symptoms related to depression and anxiety (GDS = 9/30). Endorsements included dropping activities and interests, not having hope for the future, preferring to be at home rather than trying new things, having trouble starting projects, not having energy, frequently getting upset over little things, having memory trouble, having trouble concentrating, and his mind not feeling as clear as it used to be.     IMPRESSIONS    The neuropsychological results are abnormal. Compared to baseline testing in 2016, there has been significant decline. At baseline, most performances were above average. Today, several performances remain average/low average but are at least a standard deviation lower, and there are multiple tests that fall into below average ranges. The primary area of deficit is in executive functioning (e.g., flexible thinking, abstraction, novel problem solving, complex  attentional control). Orientation is lower than expected. Visuoconstructional abilities are below normal expectations and well below levels in 2016. There has also been a significant decline in naming/lexical retrieval. Learning and memory performances are low average to average and mildly lower than at baseline.     There are indications of borderline to mild mood issues, but emotional factors do not explain the cognitive deficits.     The data implicate cerebral dysfunction that is maximal among frontal lobe systems and secondarily among lateral temporal and medial temporal systems. By base rates alone, the most likely cause would be Alzheimer s disease. The profile and clinical history could also fit with that. Aside from possible visual hallucinations, he does not show the cardinal features of Lewy body disease, and hallucinations can certainly be present in other neurodegenerative diseases. Neuroimaging in July 2021 did not show indications of hydrocephalus or severe cerebrovascular compromise. Aside from neurodegenerative concerns, there could be a secondary contribution from higher than average alcohol habits (14+ drinks/week).     Mr. Zuñiga still has several abilities that are average for his age. His well above average baseline confers substantial cognitive reserve. With today s cognitive data, it is not surprising that he is able to get though many routine daily tasks okay but struggles heavily with novel situations (e.g., tried but was unable to get to today s appointment on his own). His family reports a notable acceleration in decline over the last 6 months. I think he is in a transition zone, moving from mild cognitive impairment (MCI) into a stage of early/mild dementia.     RECOMMENDATIONS    I was able to meet with Mr. Zuñiga and his daughter after the testing session, to discuss results and recommendations.     1. They should follow up with Dr. Beaver about these results, to discuss treatment  options and any additional workups to narrow the diagnostic differential.   2. I am concerned about driving safety. I think he should refrain from driving unless he is able to pass a formal driving evaluation.   3. The planned move to a senior living facility with graded support options is appropriate.   4. An Occupational Therapy evaluation of daily living skills (e.g., CPT) is encouraged, to see what types of daily oversight might be necessary.   5. He should talk to Dr. Beaver or his primary care provider about his diminished appetite, unintentional weight loss, and increased amounts of sleeping.   6. He should wear his hearing aids faithfully.   7. Consideration could be given to a trial of a medication for mood symptoms.   8. I strongly encourage minimizing his alcohol habits.   9. He should endeavor to remain as physically healthy and active as possible.   10. Longitudinal monitoring of cognition is advised. I would like to see him again in approximately 12 months.     Max Monroy, PhD, LP, ABPP-CN  Board Certified in Clinical Neuropsychology  Licensed Psychologist [id number]      Time spent: One unit psychiatric evaluation including records review, interview, and clinical assessment licensed and board-certified neuropsychologist (CPT 42142). 158 minutes neuropsychological testing evaluation by licensed and board-certified neuropsychologist, including integration of patient data, interpretation of standardized test results and clinical data, clinical decision-making, treatment planning, report, and interactive feedback to the patient (CPT 28487, [id number]). 126 minutes of psychological and neuropsychological test administration and scoring by technician (CPT 00307, [id number]). Diagnoses: F03.A3

## 2023-03-26 ENCOUNTER — HEALTH MAINTENANCE LETTER (OUTPATIENT)
Age: 83
End: 2023-03-26

## 2023-06-20 ENCOUNTER — TRANSCRIBE ORDERS (OUTPATIENT)
Dept: OTHER | Age: 83
End: 2023-06-20

## 2023-06-20 DIAGNOSIS — G30.9 ALZHEIMER'S DISEASE (H): Primary | ICD-10-CM

## 2023-06-20 DIAGNOSIS — F02.80 ALZHEIMER'S DISEASE (H): Primary | ICD-10-CM

## 2023-06-20 DIAGNOSIS — R41.3 MEMORY LOSS: ICD-10-CM

## 2023-12-08 ENCOUNTER — OFFICE VISIT (OUTPATIENT)
Dept: NEUROLOGY | Facility: CLINIC | Age: 83
End: 2023-12-08
Payer: COMMERCIAL

## 2023-12-08 VITALS
HEART RATE: 73 BPM | BODY MASS INDEX: 19.05 KG/M2 | DIASTOLIC BLOOD PRESSURE: 73 MMHG | OXYGEN SATURATION: 97 % | TEMPERATURE: 98.2 F | SYSTOLIC BLOOD PRESSURE: 115 MMHG | WEIGHT: 129 LBS

## 2023-12-08 DIAGNOSIS — F01.B0 MODERATE VASCULAR DEMENTIA, UNSPECIFIED WHETHER BEHAVIORAL, PSYCHOTIC, OR MOOD DISTURBANCE OR ANXIETY (H): Primary | ICD-10-CM

## 2023-12-08 DIAGNOSIS — R41.3 MEMORY LOSS: ICD-10-CM

## 2023-12-08 DIAGNOSIS — G30.9 ALZHEIMER'S DISEASE (H): ICD-10-CM

## 2023-12-08 DIAGNOSIS — F02.80 ALZHEIMER'S DISEASE (H): ICD-10-CM

## 2023-12-08 LAB — VIT B12 SERPL-MCNC: 407 PG/ML (ref 232–1245)

## 2023-12-08 RX ORDER — DONEPEZIL HYDROCHLORIDE 10 MG/1
1 TABLET, FILM COATED ORAL DAILY
COMMUNITY
Start: 2023-08-01

## 2023-12-08 RX ORDER — CARBOXYMETHYLCELLULOSE SODIUM 10 MG/ML
GEL OPHTHALMIC
COMMUNITY
Start: 2023-11-22

## 2023-12-08 RX ORDER — ASPIRIN 81 MG/1
81 TABLET ORAL DAILY
COMMUNITY
Start: 2023-12-08

## 2023-12-08 RX ORDER — MEMANTINE HYDROCHLORIDE 10 MG/1
10 TABLET ORAL DAILY
COMMUNITY

## 2023-12-08 RX ORDER — CARBOXYMETHYLCELLULOSE SODIUM 5 MG/ML
1-2 SOLUTION/ DROPS OPHTHALMIC 4 TIMES DAILY
COMMUNITY
Start: 2023-04-21

## 2023-12-08 NOTE — NURSING NOTE
HPI:    Patient ID: Abel Chan is a 81year old female. Patient accompanied by son at today's appointment. Presents with c/o increasing lower extremity edema over last 2 wks. She feels that her shoes feel tighter.   She has been comp Last eat on 12/7/23 @ 5:30 am.  Lab draw on 12/8/23 @ 10:25 am.  Yessica Banks, CMA         MG Oral Tab Take 1 tablet (25 mg total) by mouth every evening. 30 tablet 3   • Budesonide-Formoterol Fumarate 160-4.5 MCG/ACT Inhalation Aerosol Inhale 2 puffs into the lungs 2 (two) times daily.  3 Inhaler 2   • ATORVASTATIN 40 MG Oral Tab TAKE 1 TABLET B General: No focal deficit present. Mental Status: She is alert.    Psychiatric:         Mood and Affect: Mood normal.                ASSESSMENT/PLAN:   Edema, unspecified type  (primary encounter diagnosis)  Pad (peripheral artery disease) (hcc)  Donovan Plavix. Respiratory status from COPD has been stable with use of budesonide/formoterol/fumarate maintenance and ipratropium/albuterol nebulizer as needed. She also has not had any escalation of pain from her lumbar spinal stenosis.   I encouraged her

## 2023-12-08 NOTE — LETTER
12/8/2023       RE: Max Zuñiga  [address]  [facility] [address]     Dear Colleague,    Thank you for referring your patient, Max Zuñiga, to the  [facility] at [facility]. Please see a copy of my visit note below.    CC:   Chief Complaint   Patient presents with    New Patient       HPI     Mr. Max Zuñiga is a 83 year old male with a past medical history of impulse control disorder (primarily anger-related), recurrent bladder cancer s/p cystectomy, laryngopharyngeal reflux disease, B12 deficiency, sensorineural hearing loss, glaucoma, hypertension, and osteoarthritis. His surgical history is included below. Mr. Zuñiga was referred to our clinic by Dr. Tomy Rothman at [facility] in [facility]. Per chart review, Mr. Zuñiga has noted cognitive changes for several years and has followed with Dr. Juana Beaver at [facility] and Lorena Ramírez PA-C at [facility]. He also saw Dr. Thomas in 2016 where he was given a diagnosis of subjective cognitive decline. He completed baseline neuropsychological testing at [facility] on 4/5/16 with Dr. Nancy Matt; the report notes that his performances on broader cognitive testing were normal and in keeping with an estimated well above average baseline. In 2021, Mr. Zuñiga underwent a radical cystoprostatectomy with bilateral pelvic lymphadenectomy and creation of ileal conduit urinary diversion and appendectomy. Following this procedure, family voiced concerns regarding a decline in his memory. It was felt that he was not following conversations or retaining information. He began experiencing some word finding difficulty.  MRI (7/14/21) revealed medial temporal lobe atrophy and mild small vessel ischemic disease. In January 2023 family noted a significant decline in function over the past 6 months. They noted issues with memory, word-finding difficulty, reduced ability to properly use  "technology, and reduced problem-solving skills. He completed neuropsychological testing with Dr. Monroy (January 2023) but it was noted that he got lost trying to get to the appointment and ended up going home. His daughter lives nearby, she picked him up and brought him in. Dr. Monroy noted a significant decline in function compared to his baseline testing in 2016 with his primary area of deficit being executive functioning as well as a decline in naming/lexical retrieval. Per Dr. Monroy: The data implicate cerebral dysfunction that is maximal among frontal lobe systems and secondarily among lateral temporal and medial temporal systems.     Family reported obtaining commercial APOE testing for Celestino with his result being e3 / e4.    Currently on donepezil 10mg and memantine 10mg. Tolerating without side effects.     Patient is here today with his wife and daughter. The goal of today's visit is to establish care with a memory specialist given family has noticed a significant decline in memory and cognitive functioning over the past year. Celestino does not have significant concerns about his memory. Per family, Celestino asks repetitive questions. He put items away in the wrong places. He put red wine in the refrigerator, which he normally would not. He forgot how to play pool (he previously was a frequent player). He does not have awareness of time or place. Even in the waiting room today he asked why they were here.     He had an incidentally discovered stroke on MRI, but he never had a clinical event.    Mood/behavior: He does not like to leave his room, even to visit his wife who lives one floor above him. Increased irritability. He sees phosphenes (a \"swirl\" geometric shape off to the corner of his vision) a couple of times per week. He denies any well-formed hallucinations.   Sleep: Sleeping more than usual. Sleeps 80% of the day. He has restless legs. He has sleep apnea but refuses to wear his CPAP. No dream enactment " "behavior.   Motor: Issues with balance and walking. He may slip on the stairs at times. He can complain of his hands feeling \"buzzy\"  with pain in his fingertips, this only occurs occasionally. Family has described a slight bilateral tremor. No falls.  Background medical problems: Has lost 50lbs over the last five years unintentionally. Family feels that they have to force him to eat or he will go for days without food. He often refuses meals. He has more recently gained weight since he is in the memory care center.  ADLs: He lives in memory care and family notes that he does dress and toilet himself independently, however, he is often found in inappropriate clothing like his suit coat without a shirt underneath. They will also find soiled clothing hidden in his room.              The patient is Requires assistance with the following iADLs: paperwork, finances, shopping, appointments.   Currently living in memory care, has been there 6 months.     MEDS:  Current Outpatient Medications   Medication    carboxymethylcellulose (REFRESH PLUS) 0.5 % SOLN ophthalmic solution    donepezil (ARICEPT) 10 MG tablet    memantine (NAMENDA) 10 MG tablet    REFRESH LIQUIGEL 1 % GEL    acetaminophen (TYLENOL) 325 MG tablet    amLODIPine (NORVASC) 5 MG tablet    brimonidine (ALPHAGAN) 0.2 % ophthalmic solution    colchicine 0.6 MG tablet    indomethacin (INDOCIN) 25 MG capsule    Magnesium Oxide 250 MG TABS    MAGNESIUM OXIDE PO    Multiple vitamin TABS    neomycin-polymyxin-dexamethasone (MAXITROL) 3.5-87133-8.1 ophthalmic ointment    OMEPRAZOLE PO    prednisoLONE acetate (PRED FORTE) 1 % ophthalmic suspension    PREVIDENT 5000 BOOSTER PLUS 1.1 % PSTE    SF 5000 PLUS 1.1 % CREA    tamsulosin (FLOMAX) 0.4 MG capsule    timolol maleate (TIMOPTIC) 0.5 % ophthalmic solution     No current facility-administered medications for this visit.       PROBLEM LIST:  Patient Active Problem List   Diagnosis    Malignant neoplasm of overlapping " sites of bladder (H)    Chondromalacia of patella    Hammer toes of both feet    Impulse control disorder    Internal derangement of knee    Laryngopharyngeal reflux disease    Lattice degeneration of peripheral retina    Low back pain with radiation    Personal history of other malignant neoplasm of skin    Pseudophakia of left eye    Secondary localized osteoarthrosis, lower leg    Senile nuclear sclerosis    Right inguinal hernia    Sensorineural hearing loss    Mild dementia with mood disturbance, unspecified dementia type (H)        PMHx:  Past Medical History:   Diagnosis Date    Bladder cancer (H)     Cataract     Laryngopharyngeal reflux disease     Macular degeneration     lattice    OA (osteoarthritis)     Sensorineural hearing loss        Surgical HX:  Past Surgical History:   Procedure Laterality Date    CYSTOSCOPY, BIOPSY BLADDER INSTILL OPTICAL AGENT N/A 12/18/2017    Procedure: CYSTOSCOPY, BIOPSY BLADDER INSTILL OPTICAL AGENT;  Cystoscopy, Bladder Biopsy,  Fulguration;  Surgeon: Faustina Carey MD;  Location: [facility] OR    CYSTOSCOPY, TRANSURETHRAL RESECTION (TUR) TUMOR BLADDER, COMBINED  2012, 11/15/2017    HC ECP WITH CATARACT SURGERY Left 11/28/2016    HC TOOTH EXTRACTION W/FORCEP      HERNIA REPAIR  1998    PROBE NASOLACRIMAL DUCT  2008    OS    Right knee arthroscopy  1997    TONSILLECTOMY          FHx:  Mother: dementia and stroke. Passed away at age 92, had symptoms around 7 years before that.   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   Head trauma history: No  Alcohol and drug screening: No illicit drug use. Not drinking at all in memory care. Prior to this he would drink daily and have multiple drinks including beer. He also enjoyed wine and martinis.   History of neurodevelopmental issues? No: 16 years of formal education and he did well in school.     OBJECTIVE     Exam:     MMSE: 4/30. Level of participation and  effort was low.  Neuro Exam: Little insight into his condition. Reserved and speaks only when asked a direct question. Defers to his wife for conversation. Alert, awake. Fluent slightly hypophonic speech. Mild bradyphrenia. Able to stand up, walk and sit without assistance. Normal gait. Able to walk on toes and heels with stand by assistance. Normal finger-to-nose. Normal appendicular tone.    Objective Testing:  Basic Metabolic Panel  Component  Ref Range & Units 11/2/22   Sodium  136 - 145 mmol/L 136   Potassium  3.5 - 5.1 mmol/L 5.4 High    Chloride  98 - 109 mmol/L 107   CO2  20 - 29 mmol/L 16 Low    Anion Gap  7 - 16 mmol/L 13   Calcium  8.4 - 10.4 mg/dL 9.4   BUN  7 - 26 mg/dL 27 High    Creatinine  0.73 - 1.18 mg/dL 1.24 High    Glucose  70 - 100 mg/dL 93   Hours Fasting 1   GFR, Estimated  >60 mL/min/1.73m2 58 Low      B12: 491 (3/2/21)  TSH: 2.11 (9/15/20)     TEST LOCATION RESULT DATE    Neuropsych Testing Max Monroy, PhD   IMPRESSIONS     The neuropsychological results are abnormal. Compared to baseline testing in 2016, there has been significant decline. At baseline, most performances were above average. Today, several performances remain average/low average but are at least a standard deviation lower, and there are multiple tests that fall into below average ranges. The primary area of deficit is in executive functioning (e.g., flexible thinking, abstraction, novel problem solving, complex attentional control). Orientation is lower than expected. Visuoconstructional abilities are below normal expectations and well below levels in 2016. There has also been a significant decline in naming/lexical retrieval. Learning and memory performances are low average to average and mildly lower than at baseline.      There are indications of borderline to mild mood issues, but emotional factors do not explain the cognitive deficits.      The data implicate cerebral dysfunction that is maximal among frontal lobe  systems and secondarily among lateral temporal and medial temporal systems. By base rates alone, the most likely cause would be Alzheimer s disease. The profile and clinical history could also fit with that. Aside from possible visual hallucinations, he does not show the cardinal features of Lewy body disease, and hallucinations can certainly be present in other neurodegenerative diseases. Neuroimaging in July 2021 did not show indications of hydrocephalus or severe cerebrovascular compromise. Aside from neurodegenerative concerns, there could be a secondary contribution from higher than average alcohol habits (14+ drinks/week).      Mr. Zuñiga still has several abilities that are average for his age. His well above average baseline confers substantial cognitive reserve. With today s cognitive data, it is not surprising that he is able to get though many routine daily tasks okay but struggles heavily with novel situations (e.g., tried but was unable to get to today s appointment on his own). His family reports a notable acceleration in decline over the last 6 months. I think he is in a transition zone, moving from mild cognitive impairment (MCI) into a stage of early/mild dementia.   1/2/23   MRI Brain  [facility] Scattered nonspecific T2/FLAIR hyperintensities within the cerebral white matter most consistent with mild chronic microvascular ischemic change. Mild generalized cerebral atrophy.    7/14/21   Neuropsych Testing Nancy Matt PsyD, LP  DIAGNOSTIC SUMMARY:   Results of neuropsychological evaluation are normal relative to the patient's superior premorbid/baseline estimate. Specifically, encoding of verbal and visual material is normal. There is very minimal information decay. Free recall and recognition memory are entirely consistent with his baseline. He continues to process information at above average speed. Fluency of verbal expression is intact with the expected semantic stronger than phonemic  pattern, and object naming above average. He exhibits relative strengths in perceptual reasoning (superior). Executive functions are generally above average including multi-tasking and response inhibition. Auditory attention, working memory, and planning/organization are normal. He denied significant depression on a screening measure (GDS=2), but rather endorsed low energy and diminished enjoyment for life. His affect appeared a bit dysthymic. No suicidal ideation. He did voluntarily participate in counseling previously for anger management. Please see record for details. No serious behavioral episodes prompting this therapy.     My impression is that Mr. Zuñiga has subjective cognitive complaints. What he is experiencing may be consistent with a normal aging process. He also describes some subtle mood difficulties in addition to fatigue, which can also disrupt attention. However, cognitive functions are entirely within expectation, even relative to his baseline and education. His functional status is reportedly intact. Now that we have a baseline, we can accurately track any cognitive changes, should they occur. Primary recommendations are aimed at maximizing health and cognitive status.  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     - Aspirin 81 mg daily for secondary stroke prevention  - Recheck Vitamin B12 given weight loss and accelerated decline over past year  - PT for balance  - Exercise: 30 minutes per day, 5 days a week  - Stay socially and intellectually active  - Music therapy from nostalgic / earlier periods of Celestino's life can help boost mood  - The 36-Hour Day: A Family Guide to Caring for People Who Have Alzheimer Disease, Other Dementias, and Memory Loss  recommended reading  -  Follow-up open ended    Today, I spent 55 minutes reviewing the chart, personally assessing objective testing, direct patient care, completing documentation and billing.      Again, thank you for allowing me to participate in the care of your patient.      Sincerely,    Padilla John MD

## 2023-12-08 NOTE — PROGRESS NOTES
CC:   Chief Complaint   Patient presents with    New Patient       HPI     Mr. Max Zuñiga is a 83 year old male with a past medical history of impulse control disorder (primarily anger-related), recurrent bladder cancer s/p cystectomy, laryngopharyngeal reflux disease, B12 deficiency, sensorineural hearing loss, glaucoma, hypertension, and osteoarthritis. His surgical history is included below. Mr. Zuñiga was referred to our clinic by Dr. Tomy Rothman at [facility] in Family Medicine. Per chart review, Mr. Zuñiga has noted cognitive changes for several years and has followed with Dr. Juana Beaver at [facility] and Lorena Ramírez PA-C at [facility]. He also saw Dr. Thomas in 2016 where he was given a diagnosis of subjective cognitive decline. He completed baseline neuropsychological testing at [facility] on 4/5/16 with Dr. Nancy Matt; the report notes that his performances on broader cognitive testing were normal and in keeping with an estimated well above average baseline. In 2021, Mr. Zuñiga underwent a radical cystoprostatectomy with bilateral pelvic lymphadenectomy and creation of ileal conduit urinary diversion and appendectomy. Following this procedure, family voiced concerns regarding a decline in his memory. It was felt that he was not following conversations or retaining information. He began experiencing some word finding difficulty.  MRI (7/14/21) revealed medial temporal lobe atrophy and mild small vessel ischemic disease. In January 2023 family noted a significant decline in function over the past 6 months. They noted issues with memory, word-finding difficulty, reduced ability to properly use technology, and reduced problem-solving skills. He completed neuropsychological testing with Dr. Monroy (January 2023) but it was noted that he got lost trying to get to the appointment and ended up going home. His daughter lives nearby, she picked him up and brought him in. Dr. Monroy noted a significant  "decline in function compared to his baseline testing in 2016 with his primary area of deficit being executive functioning as well as a decline in naming/lexical retrieval. Per Dr. Monroy: The data implicate cerebral dysfunction that is maximal among frontal lobe systems and secondarily among lateral temporal and medial temporal systems.     Family reported obtaining commercial APOE testing for Celestino with his result being e3 / e4.    Currently on donepezil 10mg and memantine 10mg. Tolerating without side effects.     Patient is here today with his wife and daughter. The goal of today's visit is to establish care with a memory specialist given family has noticed a significant decline in memory and cognitive functioning over the past year. Celestino does not have significant concerns about his memory. Per family, Celestino asks repetitive questions. He put items away in the wrong places. He put red wine in the refrigerator, which he normally would not. He forgot how to play pool (he previously was a frequent player). He does not have awareness of time or place. Even in the waiting room today he asked why they were here.     He had an incidentally discovered stroke on MRI, but he never had a clinical event.    Mood/behavior: He does not like to leave his room, even to visit his wife who lives one floor above him. Increased irritability. He sees phosphenes (a \"swirl\" geometric shape off to the corner of his vision) a couple of times per week. He denies any well-formed hallucinations.   Sleep: Sleeping more than usual. Sleeps 80% of the day. He has restless legs. He has sleep apnea but refuses to wear his CPAP. No dream enactment behavior.   Motor: Issues with balance and walking. He may slip on the stairs at times. He can complain of his hands feeling \"buzzy\"  with pain in his fingertips, this only occurs occasionally. Family has described a slight bilateral tremor. No falls.  Background medical problems: Has lost 50lbs over the last " five years unintentionally. Family feels that they have to force him to eat or he will go for days without food. He often refuses meals. He has more recently gained weight since he is in the memory care center.  ADLs: He lives in memory care and family notes that he does dress and toilet himself independently, however, he is often found in inappropriate clothing like his suit coat without a shirt underneath. They will also find soiled clothing hidden in his room.              The patient is Requires assistance with the following iADLs: paperwork, finances, shopping, appointments.   Currently living in memory care, has been there 6 months.     MEDS:  Current Outpatient Medications   Medication    carboxymethylcellulose (REFRESH PLUS) 0.5 % SOLN ophthalmic solution    donepezil (ARICEPT) 10 MG tablet    memantine (NAMENDA) 10 MG tablet    REFRESH LIQUIGEL 1 % GEL    acetaminophen (TYLENOL) 325 MG tablet    amLODIPine (NORVASC) 5 MG tablet    brimonidine (ALPHAGAN) 0.2 % ophthalmic solution    colchicine 0.6 MG tablet    indomethacin (INDOCIN) 25 MG capsule    Magnesium Oxide 250 MG TABS    MAGNESIUM OXIDE PO    Multiple vitamin TABS    neomycin-polymyxin-dexamethasone (MAXITROL) 3.5-66372-1.1 ophthalmic ointment    OMEPRAZOLE PO    prednisoLONE acetate (PRED FORTE) 1 % ophthalmic suspension    PREVIDENT 5000 BOOSTER PLUS 1.1 % PSTE    SF 5000 PLUS 1.1 % CREA    tamsulosin (FLOMAX) 0.4 MG capsule    timolol maleate (TIMOPTIC) 0.5 % ophthalmic solution     No current facility-administered medications for this visit.       PROBLEM LIST:  Patient Active Problem List   Diagnosis    Malignant neoplasm of overlapping sites of bladder (H)    Chondromalacia of patella    Hammer toes of both feet    Impulse control disorder    Internal derangement of knee    Laryngopharyngeal reflux disease    Lattice degeneration of peripheral retina    Low back pain with radiation    Personal history of other malignant neoplasm of skin     Pseudophakia of left eye    Secondary localized osteoarthrosis, lower leg    Senile nuclear sclerosis    Right inguinal hernia    Sensorineural hearing loss    Mild dementia with mood disturbance, unspecified dementia type (H)        PMHx:  Past Medical History:   Diagnosis Date    Bladder cancer (H)     Cataract     Laryngopharyngeal reflux disease     Macular degeneration     lattice    OA (osteoarthritis)     Sensorineural hearing loss        Surgical HX:  Past Surgical History:   Procedure Laterality Date    CYSTOSCOPY, BIOPSY BLADDER INSTILL OPTICAL AGENT N/A 12/18/2017    Procedure: CYSTOSCOPY, BIOPSY BLADDER INSTILL OPTICAL AGENT;  Cystoscopy, Bladder Biopsy,  Fulguration;  Surgeon: Faustina Carey MD;  Location: [facility] OR    CYSTOSCOPY, TRANSURETHRAL RESECTION (TUR) TUMOR BLADDER, COMBINED  2012, 11/15/2017    HC ECP WITH CATARACT SURGERY Left 11/28/2016    HC TOOTH EXTRACTION W/FORCEP      HERNIA REPAIR  1998    PROBE NASOLACRIMAL DUCT  2008    OS    Right knee arthroscopy  1997    TONSILLECTOMY          FHx:  Mother: dementia and stroke. Passed away at age 92, had symptoms around 7 years before that.   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   Head trauma history: No  Alcohol and drug screening: No illicit drug use. Not drinking at all in memory care. Prior to this he would drink daily and have multiple drinks including beer. He also enjoyed wine and martinis.   History of neurodevelopmental issues? No: 16 years of formal education and he did well in school.     OBJECTIVE     Exam:     MMSE: 4/30. Level of participation and effort was low.  Neuro Exam: Little insight into his condition. Reserved and speaks only when asked a direct question. Defers to his wife for conversation. Alert, awake. Fluent slightly hypophonic speech. Mild bradyphrenia. Able to stand up, walk and sit without assistance. Normal gait. Able to walk on toes  and heels with stand by assistance. Normal finger-to-nose. Normal appendicular tone.    Objective Testing:  Basic Metabolic Panel  Component  Ref Range & Units 11/2/22   Sodium  136 - 145 mmol/L 136   Potassium  3.5 - 5.1 mmol/L 5.4 High    Chloride  98 - 109 mmol/L 107   CO2  20 - 29 mmol/L 16 Low    Anion Gap  7 - 16 mmol/L 13   Calcium  8.4 - 10.4 mg/dL 9.4   BUN  7 - 26 mg/dL 27 High    Creatinine  0.73 - 1.18 mg/dL 1.24 High    Glucose  70 - 100 mg/dL 93   Hours Fasting 1   GFR, Estimated  >60 mL/min/1.73m2 58 Low      B12: 491 (3/2/21)  TSH: 2.11 (9/15/20)     TEST LOCATION RESULT DATE    Neuropsych Testing Max Monroy, PhD   IMPRESSIONS     The neuropsychological results are abnormal. Compared to baseline testing in 2016, there has been significant decline. At baseline, most performances were above average. Today, several performances remain average/low average but are at least a standard deviation lower, and there are multiple tests that fall into below average ranges. The primary area of deficit is in executive functioning (e.g., flexible thinking, abstraction, novel problem solving, complex attentional control). Orientation is lower than expected. Visuoconstructional abilities are below normal expectations and well below levels in 2016. There has also been a significant decline in naming/lexical retrieval. Learning and memory performances are low average to average and mildly lower than at baseline.      There are indications of borderline to mild mood issues, but emotional factors do not explain the cognitive deficits.      The data implicate cerebral dysfunction that is maximal among frontal lobe systems and secondarily among lateral temporal and medial temporal systems. By base rates alone, the most likely cause would be Alzheimer s disease. The profile and clinical history could also fit with that. Aside from possible visual hallucinations, he does not show the cardinal features of Lewy body disease,  and hallucinations can certainly be present in other neurodegenerative diseases. Neuroimaging in July 2021 did not show indications of hydrocephalus or severe cerebrovascular compromise. Aside from neurodegenerative concerns, there could be a secondary contribution from higher than average alcohol habits (14+ drinks/week).      Mr. Zuñiga still has several abilities that are average for his age. His well above average baseline confers substantial cognitive reserve. With today s cognitive data, it is not surprising that he is able to get though many routine daily tasks okay but struggles heavily with novel situations (e.g., tried but was unable to get to today s appointment on his own). His family reports a notable acceleration in decline over the last 6 months. I think he is in a transition zone, moving from mild cognitive impairment (MCI) into a stage of early/mild dementia.   1/2/23   MRI Brain  [facility] Scattered nonspecific T2/FLAIR hyperintensities within the cerebral white matter most consistent with mild chronic microvascular ischemic change. Mild generalized cerebral atrophy.    7/14/21   Neuropsych Testing Nancy Matt PsyD, [place]  DIAGNOSTIC SUMMARY:   Results of neuropsychological evaluation are normal relative to the patient's superior premorbid/baseline estimate. Specifically, encoding of verbal and visual material is normal. There is very minimal information decay. Free recall and recognition memory are entirely consistent with his baseline. He continues to process information at above average speed. Fluency of verbal expression is intact with the expected semantic stronger than phonemic pattern, and object naming above average. He exhibits relative strengths in perceptual reasoning (superior). Executive functions are generally above average including multi-tasking and response inhibition. Auditory attention, working memory, and planning/organization are normal. He denied significant depression on a  screening measure (GDS=2), but rather endorsed low energy and diminished enjoyment for life. His affect appeared a bit dysthymic. No suicidal ideation. He did voluntarily participate in counseling previously for anger management. Please see record for details. No serious behavioral episodes prompting this therapy.     My impression is that Mr. Zuñiga has subjective cognitive complaints. What he is experiencing may be consistent with a normal aging process. He also describes some subtle mood difficulties in addition to fatigue, which can also disrupt attention. However, cognitive functions are entirely within expectation, even relative to his baseline and education. His functional status is reportedly intact. Now that we have a baseline, we can accurately track any cognitive changes, should they occur. Primary recommendations are aimed at maximizing health and cognitive status.  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     - Aspirin 81 mg daily for secondary stroke prevention  - Recheck Vitamin B12 given weight loss and accelerated decline over past year  - PT for balance  - Exercise: 30 minutes per day, 5 days a week  - Stay socially and intellectually active  - Music therapy from nostalgic / earlier periods of Celestino's life can help boost mood  - The 36-Hour Day: A Family Guide to Caring for People Who Have Alzheimer Disease, Other Dementias, and Memory Loss  recommended reading  - Follow-up open ended    Today, I spent 55 minutes reviewing the chart, personally assessing objective testing, direct patient care, completing documentation and billing.

## 2023-12-13 ENCOUNTER — TELEPHONE (OUTPATIENT)
Dept: NEUROLOGY | Facility: CLINIC | Age: 83
End: 2023-12-13

## 2023-12-13 NOTE — TELEPHONE ENCOUNTER
I spoke with Iqra, Celestino's wife regarding the results of his B12 testing ordered by Dr. John. While Celestino's B12 level was normal, I noted that over the past year it appears he has had a steady decline in his levels (see below). I recommended starting B12 supplement 1000mcg daily due to decreasing B12 levels over the past year. Iqra agreed with this plan. She will reach out with any additional questions.     B12  12/8/23: 407 pg/mL  6/16/23: 519 pg/mL  1/31/23: 787 pg/mL

## 2024-10-19 ENCOUNTER — HEALTH MAINTENANCE LETTER (OUTPATIENT)
Age: 84
End: 2024-10-19

## 2025-01-10 ENCOUNTER — LAB REQUISITION (OUTPATIENT)
Dept: LAB | Facility: CLINIC | Age: 85
End: 2025-01-10
Payer: COMMERCIAL

## 2025-01-10 DIAGNOSIS — E53.8 DEFICIENCY OF OTHER SPECIFIED B GROUP VITAMINS: ICD-10-CM

## 2025-01-10 DIAGNOSIS — R64 CACHEXIA: ICD-10-CM

## 2025-01-10 DIAGNOSIS — H40.9 UNSPECIFIED GLAUCOMA: ICD-10-CM

## 2025-01-10 DIAGNOSIS — Z90.79 ACQUIRED ABSENCE OF OTHER GENITAL ORGAN(S): ICD-10-CM

## 2025-01-10 DIAGNOSIS — M94.20 CHONDROMALACIA, UNSPECIFIED SITE: ICD-10-CM

## 2025-01-10 DIAGNOSIS — Z86.73 PERSONAL HISTORY OF TRANSIENT ISCHEMIC ATTACK (TIA), AND CEREBRAL INFARCTION WITHOUT RESIDUAL DEFICITS: ICD-10-CM

## 2025-01-10 DIAGNOSIS — Z90.6 ACQUIRED ABSENCE OF OTHER PARTS OF URINARY TRACT: ICD-10-CM

## 2025-01-10 DIAGNOSIS — Z86.69 PERSONAL HISTORY OF OTHER DISEASES OF THE NERVOUS SYSTEM AND SENSE ORGANS: ICD-10-CM

## 2025-01-10 DIAGNOSIS — Z93.6 OTHER ARTIFICIAL OPENINGS OF URINARY TRACT STATUS (H): ICD-10-CM

## 2025-01-10 DIAGNOSIS — F02.C0 DEMENTIA IN OTHER DISEASES CLASSIFIED ELSEWHERE, SEVERE, WITHOUT BEHAVIORAL DISTURBANCE, PSYCHOTIC DISTURBANCE, MOOD DISTURBANCE, AND ANXIETY (H): ICD-10-CM

## 2025-01-10 DIAGNOSIS — Z71.89 OTHER SPECIFIED COUNSELING: ICD-10-CM

## 2025-01-10 DIAGNOSIS — C67.8 MALIGNANT NEOPLASM OF OVERLAPPING SITES OF BLADDER (H): ICD-10-CM

## 2025-01-10 DIAGNOSIS — F63.9 IMPULSE DISORDER, UNSPECIFIED: ICD-10-CM

## 2025-01-10 DIAGNOSIS — G30.1 ALZHEIMER'S DISEASE WITH LATE ONSET (CODE) (H): ICD-10-CM

## 2025-01-10 DIAGNOSIS — M10.9 GOUT, UNSPECIFIED: ICD-10-CM

## 2025-01-10 DIAGNOSIS — I10 ESSENTIAL (PRIMARY) HYPERTENSION: ICD-10-CM

## 2025-01-10 DIAGNOSIS — Z87.891 PERSONAL HISTORY OF NICOTINE DEPENDENCE: ICD-10-CM

## 2025-01-10 DIAGNOSIS — F32.5 MAJOR DEPRESSIVE DISORDER, SINGLE EPISODE, IN FULL REMISSION: ICD-10-CM

## 2025-01-10 DIAGNOSIS — N18.31 CHRONIC KIDNEY DISEASE, STAGE 3A (H): ICD-10-CM

## 2025-01-11 ENCOUNTER — LAB REQUISITION (OUTPATIENT)
Dept: LAB | Facility: CLINIC | Age: 85
End: 2025-01-11
Payer: COMMERCIAL

## 2025-01-11 DIAGNOSIS — C67.0 MALIGNANT NEOPLASM OF TRIGONE OF BLADDER (H): ICD-10-CM

## 2025-01-11 DIAGNOSIS — R82.90 UNSPECIFIED ABNORMAL FINDINGS IN URINE: ICD-10-CM

## 2025-01-11 LAB
ALBUMIN UR-MCNC: 300 MG/DL
APPEARANCE UR: ABNORMAL
BACTERIA #/AREA URNS HPF: ABNORMAL /HPF
BILIRUB UR QL STRIP: NEGATIVE
COLOR UR AUTO: YELLOW
GLUCOSE UR STRIP-MCNC: NEGATIVE MG/DL
HGB UR QL STRIP: NEGATIVE
KETONES UR STRIP-MCNC: NEGATIVE MG/DL
LEUKOCYTE ESTERASE UR QL STRIP: ABNORMAL
MUCOUS THREADS #/AREA URNS LPF: PRESENT /LPF
NITRATE UR QL: POSITIVE
PH UR STRIP: 8.5 [PH] (ref 5–7)
RBC URINE: 17 /HPF
SP GR UR STRIP: 1.01 (ref 1–1.03)
URATE CRY #/AREA URNS HPF: ABNORMAL /HPF
UROBILINOGEN UR STRIP-MCNC: NORMAL MG/DL
WBC URINE: 39 /HPF

## 2025-01-11 PROCEDURE — 81001 URINALYSIS AUTO W/SCOPE: CPT | Mod: ORL | Performed by: FAMILY MEDICINE

## 2025-01-11 PROCEDURE — 87086 URINE CULTURE/COLONY COUNT: CPT | Mod: ORL | Performed by: FAMILY MEDICINE

## 2025-01-12 LAB — BACTERIA UR CULT: NORMAL

## 2025-01-17 LAB
ALBUMIN SERPL BCG-MCNC: 3.5 G/DL (ref 3.5–5.2)
ALP SERPL-CCNC: 64 U/L (ref 40–150)
ALT SERPL W P-5'-P-CCNC: 12 U/L (ref 0–70)
ANION GAP SERPL CALCULATED.3IONS-SCNC: 8 MMOL/L (ref 7–15)
AST SERPL W P-5'-P-CCNC: 26 U/L (ref 0–45)
BILIRUB SERPL-MCNC: 0.4 MG/DL
BUN SERPL-MCNC: 32.3 MG/DL (ref 8–23)
CALCIUM SERPL-MCNC: 9.6 MG/DL (ref 8.8–10.4)
CHLORIDE SERPL-SCNC: 106 MMOL/L (ref 98–107)
CREAT SERPL-MCNC: 1.44 MG/DL (ref 0.67–1.17)
CRP SERPL-MCNC: 9.91 MG/L
EGFRCR SERPLBLD CKD-EPI 2021: 48 ML/MIN/1.73M2
ERYTHROCYTE [DISTWIDTH] IN BLOOD BY AUTOMATED COUNT: 13.3 % (ref 10–15)
GLUCOSE SERPL-MCNC: 81 MG/DL (ref 70–99)
HCO3 SERPL-SCNC: 25 MMOL/L (ref 22–29)
HCT VFR BLD AUTO: 38.8 % (ref 40–53)
HGB BLD-MCNC: 12.3 G/DL (ref 13.3–17.7)
MCH RBC QN AUTO: 31.2 PG (ref 26.5–33)
MCHC RBC AUTO-ENTMCNC: 31.7 G/DL (ref 31.5–36.5)
MCV RBC AUTO: 99 FL (ref 78–100)
PLATELET # BLD AUTO: 269 10E3/UL (ref 150–450)
POTASSIUM SERPL-SCNC: 5.1 MMOL/L (ref 3.4–5.3)
PROT SERPL-MCNC: 6.3 G/DL (ref 6.4–8.3)
RBC # BLD AUTO: 3.94 10E6/UL (ref 4.4–5.9)
SODIUM SERPL-SCNC: 139 MMOL/L (ref 135–145)
TSH SERPL DL<=0.005 MIU/L-ACNC: 1.98 UIU/ML (ref 0.3–4.2)
WBC # BLD AUTO: 7.7 10E3/UL (ref 4–11)

## 2025-01-17 PROCEDURE — P9604 ONE-WAY ALLOW PRORATED TRIP: HCPCS | Mod: ORL | Performed by: FAMILY MEDICINE

## 2025-01-17 PROCEDURE — 85027 COMPLETE CBC AUTOMATED: CPT | Mod: ORL | Performed by: FAMILY MEDICINE

## 2025-01-17 PROCEDURE — 84443 ASSAY THYROID STIM HORMONE: CPT | Mod: ORL | Performed by: FAMILY MEDICINE

## 2025-01-17 PROCEDURE — 80053 COMPREHEN METABOLIC PANEL: CPT | Mod: ORL | Performed by: FAMILY MEDICINE

## 2025-01-17 PROCEDURE — 36415 COLL VENOUS BLD VENIPUNCTURE: CPT | Mod: ORL | Performed by: FAMILY MEDICINE

## 2025-01-17 PROCEDURE — 86140 C-REACTIVE PROTEIN: CPT | Mod: ORL | Performed by: FAMILY MEDICINE

## (undated) DEVICE — PACK GOWN 3/PK DISP XL SBA32GPFCB

## (undated) DEVICE — SYR 70ML TOOMEY 041170

## (undated) DEVICE — LINEN TOWEL PACK X5 5464

## (undated) DEVICE — Device

## (undated) DEVICE — PACK CYSTO UMMC CUSTOM

## (undated) DEVICE — SUCTION MANIFOLD DORNOCH ULTRA CART UL-CL500

## (undated) DEVICE — PAD CHUX UNDERPAD 23X24" 7136

## (undated) DEVICE — SOL WATER IRRIG 3000ML BAG 2B7117

## (undated) DEVICE — EVACUATOR BLADDER UROVAC LATEX M0067301250

## (undated) DEVICE — DRSG TELFA 3X8" 1238

## (undated) DEVICE — SOL NACL 0.9% IRRIG 3000ML BAG 2B7477

## (undated) RX ORDER — HYDROMORPHONE HYDROCHLORIDE 1 MG/ML
INJECTION, SOLUTION INTRAMUSCULAR; INTRAVENOUS; SUBCUTANEOUS
Status: DISPENSED
Start: 2017-12-18

## (undated) RX ORDER — MAGNESIUM HYDROXIDE 1200 MG/15ML
LIQUID ORAL
Status: DISPENSED
Start: 2017-12-18

## (undated) RX ORDER — CEFAZOLIN SODIUM 2 G/100ML
INJECTION, SOLUTION INTRAVENOUS
Status: DISPENSED
Start: 2017-12-18

## (undated) RX ORDER — LIDOCAINE HYDROCHLORIDE 20 MG/ML
JELLY TOPICAL
Status: DISPENSED
Start: 2019-12-16

## (undated) RX ORDER — LIDOCAINE HYDROCHLORIDE 20 MG/ML
JELLY TOPICAL
Status: DISPENSED
Start: 2020-05-13

## (undated) RX ORDER — LIDOCAINE HYDROCHLORIDE 20 MG/ML
JELLY TOPICAL
Status: DISPENSED
Start: 2020-11-24

## (undated) RX ORDER — LIDOCAINE HYDROCHLORIDE 20 MG/ML
JELLY TOPICAL
Status: DISPENSED
Start: 2019-06-10

## (undated) RX ORDER — FENTANYL CITRATE 50 UG/ML
INJECTION, SOLUTION INTRAMUSCULAR; INTRAVENOUS
Status: DISPENSED
Start: 2017-12-18

## (undated) RX ORDER — HYDROMORPHONE HCL/0.9% NACL/PF 0.2MG/0.2
SYRINGE (ML) INTRAVENOUS
Status: DISPENSED
Start: 2017-12-18